# Patient Record
Sex: MALE
[De-identification: names, ages, dates, MRNs, and addresses within clinical notes are randomized per-mention and may not be internally consistent; named-entity substitution may affect disease eponyms.]

---

## 2017-07-12 ENCOUNTER — APPOINTMENT (OUTPATIENT)
Dept: UROLOGY | Facility: CLINIC | Age: 72
End: 2017-07-12

## 2017-07-12 VITALS
TEMPERATURE: 98 F | OXYGEN SATURATION: 96 % | HEIGHT: 71 IN | RESPIRATION RATE: 16 BRPM | HEART RATE: 82 BPM | DIASTOLIC BLOOD PRESSURE: 65 MMHG | BODY MASS INDEX: 29.12 KG/M2 | WEIGHT: 208 LBS | SYSTOLIC BLOOD PRESSURE: 116 MMHG

## 2017-07-22 LAB — PSA SERPL-MCNC: 2.67

## 2017-08-10 ENCOUNTER — NON-APPOINTMENT (OUTPATIENT)
Age: 72
End: 2017-08-10

## 2017-08-10 ENCOUNTER — APPOINTMENT (OUTPATIENT)
Dept: CARDIOLOGY | Facility: CLINIC | Age: 72
End: 2017-08-10
Payer: MEDICARE

## 2017-08-10 VITALS
SYSTOLIC BLOOD PRESSURE: 152 MMHG | HEIGHT: 71 IN | OXYGEN SATURATION: 98 % | WEIGHT: 202 LBS | HEART RATE: 64 BPM | DIASTOLIC BLOOD PRESSURE: 71 MMHG | BODY MASS INDEX: 28.28 KG/M2

## 2017-08-10 PROCEDURE — 99204 OFFICE O/P NEW MOD 45 MIN: CPT | Mod: 25

## 2017-08-10 PROCEDURE — 93000 ELECTROCARDIOGRAM COMPLETE: CPT

## 2017-09-27 ENCOUNTER — APPOINTMENT (OUTPATIENT)
Dept: CARDIOLOGY | Facility: CLINIC | Age: 72
End: 2017-09-27
Payer: MEDICARE

## 2017-09-27 PROCEDURE — 99214 OFFICE O/P EST MOD 30 MIN: CPT | Mod: 25

## 2017-09-27 PROCEDURE — 93325 DOPPLER ECHO COLOR FLOW MAPG: CPT

## 2017-09-27 PROCEDURE — 93351 STRESS TTE COMPLETE: CPT

## 2017-09-27 PROCEDURE — 93320 DOPPLER ECHO COMPLETE: CPT

## 2018-01-11 ENCOUNTER — APPOINTMENT (OUTPATIENT)
Dept: CARDIOLOGY | Facility: CLINIC | Age: 73
End: 2018-01-11
Payer: MEDICARE

## 2018-01-11 ENCOUNTER — NON-APPOINTMENT (OUTPATIENT)
Age: 73
End: 2018-01-11

## 2018-01-11 VITALS
BODY MASS INDEX: 28.7 KG/M2 | OXYGEN SATURATION: 99 % | HEART RATE: 53 BPM | DIASTOLIC BLOOD PRESSURE: 67 MMHG | WEIGHT: 205 LBS | SYSTOLIC BLOOD PRESSURE: 146 MMHG | HEIGHT: 71 IN

## 2018-01-11 PROCEDURE — 93000 ELECTROCARDIOGRAM COMPLETE: CPT

## 2018-01-11 PROCEDURE — 99214 OFFICE O/P EST MOD 30 MIN: CPT | Mod: 25

## 2018-03-28 ENCOUNTER — MEDICATION RENEWAL (OUTPATIENT)
Age: 73
End: 2018-03-28

## 2018-07-05 ENCOUNTER — MEDICATION RENEWAL (OUTPATIENT)
Age: 73
End: 2018-07-05

## 2018-08-14 ENCOUNTER — FORM ENCOUNTER (OUTPATIENT)
Age: 73
End: 2018-08-14

## 2018-08-15 ENCOUNTER — APPOINTMENT (OUTPATIENT)
Dept: CARDIOLOGY | Facility: CLINIC | Age: 73
End: 2018-08-15
Payer: MEDICARE

## 2018-08-15 ENCOUNTER — APPOINTMENT (OUTPATIENT)
Dept: CT IMAGING | Facility: CLINIC | Age: 73
End: 2018-08-15
Payer: MEDICARE

## 2018-08-15 ENCOUNTER — OUTPATIENT (OUTPATIENT)
Dept: OUTPATIENT SERVICES | Facility: HOSPITAL | Age: 73
LOS: 1 days | End: 2018-08-15
Payer: MEDICARE

## 2018-08-15 VITALS
DIASTOLIC BLOOD PRESSURE: 68 MMHG | HEIGHT: 71 IN | WEIGHT: 200 LBS | HEART RATE: 62 BPM | SYSTOLIC BLOOD PRESSURE: 132 MMHG | OXYGEN SATURATION: 98 % | BODY MASS INDEX: 28 KG/M2

## 2018-08-15 DIAGNOSIS — Z00.8 ENCOUNTER FOR OTHER GENERAL EXAMINATION: ICD-10-CM

## 2018-08-15 PROCEDURE — 74177 CT ABD & PELVIS W/CONTRAST: CPT | Mod: 26

## 2018-08-15 PROCEDURE — 82565 ASSAY OF CREATININE: CPT

## 2018-08-15 PROCEDURE — 99215 OFFICE O/P EST HI 40 MIN: CPT | Mod: 25

## 2018-08-15 PROCEDURE — 93000 ELECTROCARDIOGRAM COMPLETE: CPT

## 2018-08-15 PROCEDURE — 74177 CT ABD & PELVIS W/CONTRAST: CPT

## 2018-08-20 ENCOUNTER — NON-APPOINTMENT (OUTPATIENT)
Age: 73
End: 2018-08-20

## 2018-08-22 ENCOUNTER — APPOINTMENT (OUTPATIENT)
Dept: CARDIOLOGY | Facility: CLINIC | Age: 73
End: 2018-08-22
Payer: MEDICARE

## 2018-08-22 VITALS
HEART RATE: 58 BPM | DIASTOLIC BLOOD PRESSURE: 66 MMHG | WEIGHT: 200 LBS | HEIGHT: 71 IN | OXYGEN SATURATION: 96 % | SYSTOLIC BLOOD PRESSURE: 125 MMHG | BODY MASS INDEX: 28 KG/M2

## 2018-08-22 PROCEDURE — 99214 OFFICE O/P EST MOD 30 MIN: CPT

## 2018-08-26 ENCOUNTER — NON-APPOINTMENT (OUTPATIENT)
Age: 73
End: 2018-08-26

## 2018-09-14 ENCOUNTER — APPOINTMENT (OUTPATIENT)
Dept: UROLOGY | Facility: CLINIC | Age: 73
End: 2018-09-14
Payer: MEDICARE

## 2018-09-14 VITALS
OXYGEN SATURATION: 97 % | HEIGHT: 71 IN | SYSTOLIC BLOOD PRESSURE: 148 MMHG | HEART RATE: 60 BPM | WEIGHT: 201 LBS | TEMPERATURE: 97.7 F | DIASTOLIC BLOOD PRESSURE: 69 MMHG | BODY MASS INDEX: 28.14 KG/M2

## 2018-09-14 LAB — PSA SERPL-MCNC: 2.21

## 2018-09-14 PROCEDURE — 99213 OFFICE O/P EST LOW 20 MIN: CPT

## 2018-09-17 ENCOUNTER — OUTPATIENT (OUTPATIENT)
Dept: OUTPATIENT SERVICES | Facility: HOSPITAL | Age: 73
LOS: 1 days | Discharge: HOME | End: 2018-09-17
Payer: MEDICARE

## 2018-09-17 VITALS
HEIGHT: 70 IN | RESPIRATION RATE: 14 BRPM | SYSTOLIC BLOOD PRESSURE: 132 MMHG | WEIGHT: 201.06 LBS | DIASTOLIC BLOOD PRESSURE: 62 MMHG | OXYGEN SATURATION: 97 % | HEART RATE: 61 BPM | TEMPERATURE: 98 F

## 2018-09-17 DIAGNOSIS — Z98.890 OTHER SPECIFIED POSTPROCEDURAL STATES: Chronic | ICD-10-CM

## 2018-09-17 DIAGNOSIS — R19.5 OTHER FECAL ABNORMALITIES: ICD-10-CM

## 2018-09-17 DIAGNOSIS — Z90.89 ACQUIRED ABSENCE OF OTHER ORGANS: Chronic | ICD-10-CM

## 2018-09-17 LAB
ANION GAP SERPL CALC-SCNC: 7 MMOL/L — SIGNIFICANT CHANGE UP (ref 5–17)
BASOPHILS # BLD AUTO: 0.03 K/UL — SIGNIFICANT CHANGE UP (ref 0–0.2)
BASOPHILS NFR BLD AUTO: 0.4 % — SIGNIFICANT CHANGE UP (ref 0–2)
BUN SERPL-MCNC: 36 MG/DL — HIGH (ref 7–23)
CALCIUM SERPL-MCNC: 9.4 MG/DL — SIGNIFICANT CHANGE UP (ref 8.5–10.1)
CHLORIDE SERPL-SCNC: 106 MMOL/L — SIGNIFICANT CHANGE UP (ref 96–108)
CO2 SERPL-SCNC: 28 MMOL/L — SIGNIFICANT CHANGE UP (ref 22–31)
CREAT SERPL-MCNC: 1.61 MG/DL — HIGH (ref 0.5–1.3)
EOSINOPHIL # BLD AUTO: 0.45 K/UL — SIGNIFICANT CHANGE UP (ref 0–0.5)
EOSINOPHIL NFR BLD AUTO: 5.4 % — SIGNIFICANT CHANGE UP (ref 0–6)
GLUCOSE SERPL-MCNC: 220 MG/DL — HIGH (ref 70–99)
HCT VFR BLD CALC: 42.7 % — SIGNIFICANT CHANGE UP (ref 39–50)
HGB BLD-MCNC: 13.6 G/DL — SIGNIFICANT CHANGE UP (ref 13–17)
IMM GRANULOCYTES NFR BLD AUTO: 0.6 % — SIGNIFICANT CHANGE UP (ref 0–1.5)
LYMPHOCYTES # BLD AUTO: 1.72 K/UL — SIGNIFICANT CHANGE UP (ref 1–3.3)
LYMPHOCYTES # BLD AUTO: 20.7 % — SIGNIFICANT CHANGE UP (ref 13–44)
MCHC RBC-ENTMCNC: 29.8 PG — SIGNIFICANT CHANGE UP (ref 27–34)
MCHC RBC-ENTMCNC: 31.9 GM/DL — LOW (ref 32–36)
MCV RBC AUTO: 93.4 FL — SIGNIFICANT CHANGE UP (ref 80–100)
MONOCYTES # BLD AUTO: 0.54 K/UL — SIGNIFICANT CHANGE UP (ref 0–0.9)
MONOCYTES NFR BLD AUTO: 6.5 % — SIGNIFICANT CHANGE UP (ref 2–14)
NEUTROPHILS # BLD AUTO: 5.51 K/UL — SIGNIFICANT CHANGE UP (ref 1.8–7.4)
NEUTROPHILS NFR BLD AUTO: 66.4 % — SIGNIFICANT CHANGE UP (ref 43–77)
PLATELET # BLD AUTO: 252 K/UL — SIGNIFICANT CHANGE UP (ref 150–400)
POTASSIUM SERPL-MCNC: 4.3 MMOL/L — SIGNIFICANT CHANGE UP (ref 3.5–5.3)
POTASSIUM SERPL-SCNC: 4.3 MMOL/L — SIGNIFICANT CHANGE UP (ref 3.5–5.3)
RBC # BLD: 4.57 M/UL — SIGNIFICANT CHANGE UP (ref 4.2–5.8)
RBC # FLD: 13.7 % — SIGNIFICANT CHANGE UP (ref 10.3–14.5)
SODIUM SERPL-SCNC: 141 MMOL/L — SIGNIFICANT CHANGE UP (ref 135–145)
WBC # BLD: 8.3 K/UL — SIGNIFICANT CHANGE UP (ref 3.8–10.5)
WBC # FLD AUTO: 8.3 K/UL — SIGNIFICANT CHANGE UP (ref 3.8–10.5)

## 2018-09-17 PROCEDURE — 93010 ELECTROCARDIOGRAM REPORT: CPT

## 2018-09-17 NOTE — H&P PST ADULT - PMH
Blood in stool    BPH (benign prostatic hyperplasia)    Eczema    Hypertriglyceridemia    Non-Hodgkin lymphoma  in neck s/p radiation  Pure hypercholesterolemia    Screening for colon cancer    Type 2 diabetes mellitus Blood in stool    BPH (benign prostatic hyperplasia)    Eczema    Hypertriglyceridemia    Non-Hodgkin lymphoma  in neck s/p radiation  Pure hypercholesterolemia    Screening for colon cancer    Type 2 diabetes mellitus    Upper respiratory infection  currently on levofloxacin

## 2018-09-17 NOTE — H&P PST ADULT - NSANTHOSAYNRD_GEN_A_CORE
No. ALMAZ screening performed.  STOP BANG Legend: 0-2 = LOW Risk; 3-4 = INTERMEDIATE Risk; 5-8 = HIGH Risk

## 2018-09-17 NOTE — H&P PST ADULT - ASSESSMENT
yo  scheduled for EGD/colonoscopy with Dr. urias    1. Labs as per surgeon  2. EKG  3. Pre-procedure instructions as per surgeon 72 yo male scheduled for baseline colonoscopy on 9/25/18 with Dr. Simmons    1. Labs as per surgeon  2. EKG  3. Medication & Pre-procedure instructions as per surgeon

## 2018-09-17 NOTE — H&P PST ADULT - HISTORY OF PRESENT ILLNESS
72 yo male presents to PST. Reports seeing blood in stool a few months back & having episodes of diarrhea at that time. Reports diarrhea & blood in stool have resolved since then. Consulted with Dr Simmons who recommended baseline colonoscopy screening. Denies n/v or abdominal pain

## 2018-09-17 NOTE — H&P PST ADULT - HEMATOLOGY/LYMPHATICS COMMENTS
Reports being treated for non-Hodgkin's lymphoma in neck s/p radiation 2003. Denies recent f/u or treatment.

## 2018-09-17 NOTE — H&P PST ADULT - FAMILY HISTORY
Mother  Still living? No  Family history of type 2 diabetes mellitus, Age at diagnosis: Age Unknown  Family history of leukemia, Age at diagnosis: Age Unknown

## 2018-09-24 PROBLEM — N40.0 BENIGN PROSTATIC HYPERPLASIA WITHOUT LOWER URINARY TRACT SYMPTOMS: Chronic | Status: ACTIVE | Noted: 2018-09-17

## 2018-09-24 PROBLEM — E78.00 PURE HYPERCHOLESTEROLEMIA, UNSPECIFIED: Chronic | Status: ACTIVE | Noted: 2018-09-17

## 2018-09-24 PROBLEM — Z12.11 ENCOUNTER FOR SCREENING FOR MALIGNANT NEOPLASM OF COLON: Chronic | Status: ACTIVE | Noted: 2018-09-17

## 2018-09-24 PROBLEM — K92.1 MELENA: Chronic | Status: ACTIVE | Noted: 2018-09-17

## 2018-09-24 PROBLEM — C85.90 NON-HODGKIN LYMPHOMA, UNSPECIFIED, UNSPECIFIED SITE: Chronic | Status: ACTIVE | Noted: 2018-09-17

## 2018-09-24 PROBLEM — L30.9 DERMATITIS, UNSPECIFIED: Chronic | Status: ACTIVE | Noted: 2018-09-17

## 2018-09-24 PROBLEM — E78.1 PURE HYPERGLYCERIDEMIA: Chronic | Status: ACTIVE | Noted: 2018-09-17

## 2018-09-24 PROBLEM — E11.9 TYPE 2 DIABETES MELLITUS WITHOUT COMPLICATIONS: Chronic | Status: ACTIVE | Noted: 2018-09-17

## 2018-09-24 PROBLEM — J06.9 ACUTE UPPER RESPIRATORY INFECTION, UNSPECIFIED: Chronic | Status: ACTIVE | Noted: 2018-09-17

## 2018-09-25 ENCOUNTER — RESULT REVIEW (OUTPATIENT)
Age: 73
End: 2018-09-25

## 2018-09-25 ENCOUNTER — OUTPATIENT (OUTPATIENT)
Dept: OUTPATIENT SERVICES | Facility: HOSPITAL | Age: 73
LOS: 1 days | Discharge: HOME | End: 2018-09-25
Payer: MEDICARE

## 2018-09-25 VITALS
HEART RATE: 58 BPM | RESPIRATION RATE: 17 BRPM | HEIGHT: 71 IN | SYSTOLIC BLOOD PRESSURE: 135 MMHG | TEMPERATURE: 98 F | OXYGEN SATURATION: 97 % | WEIGHT: 201.06 LBS | DIASTOLIC BLOOD PRESSURE: 74 MMHG

## 2018-09-25 DIAGNOSIS — Z90.89 ACQUIRED ABSENCE OF OTHER ORGANS: Chronic | ICD-10-CM

## 2018-09-25 DIAGNOSIS — Z98.890 OTHER SPECIFIED POSTPROCEDURAL STATES: Chronic | ICD-10-CM

## 2018-09-25 LAB — GLUCOSE BLDC GLUCOMTR-MCNC: 159 MG/DL — HIGH (ref 70–99)

## 2018-09-25 PROCEDURE — 88305 TISSUE EXAM BY PATHOLOGIST: CPT | Mod: 26

## 2018-09-25 NOTE — ASU PATIENT PROFILE, ADULT - PMH
Blood in stool    BPH (benign prostatic hyperplasia)    Eczema    Hypertriglyceridemia    Non-Hodgkin lymphoma  in neck s/p radiation  Pure hypercholesterolemia    Screening for colon cancer    Type 2 diabetes mellitus    Upper respiratory infection  currently on levofloxacin

## 2018-09-26 LAB — SURGICAL PATHOLOGY FINAL REPORT - CH: SIGNIFICANT CHANGE UP

## 2018-10-01 DIAGNOSIS — K62.5 HEMORRHAGE OF ANUS AND RECTUM: ICD-10-CM

## 2018-10-01 DIAGNOSIS — Z83.3 FAMILY HISTORY OF DIABETES MELLITUS: ICD-10-CM

## 2018-10-01 DIAGNOSIS — K57.30 DIVERTICULOSIS OF LARGE INTESTINE WITHOUT PERFORATION OR ABSCESS WITHOUT BLEEDING: ICD-10-CM

## 2018-10-01 DIAGNOSIS — Z79.82 LONG TERM (CURRENT) USE OF ASPIRIN: ICD-10-CM

## 2018-10-01 DIAGNOSIS — Z85.72 PERSONAL HISTORY OF NON-HODGKIN LYMPHOMAS: ICD-10-CM

## 2018-10-01 DIAGNOSIS — Z79.4 LONG TERM (CURRENT) USE OF INSULIN: ICD-10-CM

## 2018-10-01 DIAGNOSIS — E11.9 TYPE 2 DIABETES MELLITUS WITHOUT COMPLICATIONS: ICD-10-CM

## 2018-10-01 DIAGNOSIS — Z88.8 ALLERGY STATUS TO OTHER DRUGS, MEDICAMENTS AND BIOLOGICAL SUBSTANCES STATUS: ICD-10-CM

## 2018-10-01 DIAGNOSIS — D12.3 BENIGN NEOPLASM OF TRANSVERSE COLON: ICD-10-CM

## 2018-10-01 DIAGNOSIS — D12.4 BENIGN NEOPLASM OF DESCENDING COLON: ICD-10-CM

## 2018-10-01 DIAGNOSIS — N40.0 BENIGN PROSTATIC HYPERPLASIA WITHOUT LOWER URINARY TRACT SYMPTOMS: ICD-10-CM

## 2018-10-01 DIAGNOSIS — E78.1 PURE HYPERGLYCERIDEMIA: ICD-10-CM

## 2018-10-01 DIAGNOSIS — Z80.7 FAMILY HISTORY OF OTHER MALIGNANT NEOPLASMS OF LYMPHOID, HEMATOPOIETIC AND RELATED TISSUES: ICD-10-CM

## 2018-10-01 DIAGNOSIS — Z92.3 PERSONAL HISTORY OF IRRADIATION: ICD-10-CM

## 2018-10-05 ENCOUNTER — APPOINTMENT (OUTPATIENT)
Dept: UROLOGY | Facility: CLINIC | Age: 73
End: 2018-10-05

## 2018-12-05 ENCOUNTER — APPOINTMENT (OUTPATIENT)
Dept: CARDIOLOGY | Facility: CLINIC | Age: 73
End: 2018-12-05

## 2018-12-07 ENCOUNTER — APPOINTMENT (OUTPATIENT)
Dept: UROLOGY | Facility: CLINIC | Age: 73
End: 2018-12-07
Payer: MEDICARE

## 2018-12-07 VITALS
BODY MASS INDEX: 28.7 KG/M2 | SYSTOLIC BLOOD PRESSURE: 150 MMHG | OXYGEN SATURATION: 98 % | HEIGHT: 71 IN | TEMPERATURE: 97.3 F | HEART RATE: 55 BPM | DIASTOLIC BLOOD PRESSURE: 77 MMHG | WEIGHT: 205 LBS

## 2018-12-07 PROCEDURE — 52000 CYSTOURETHROSCOPY: CPT

## 2018-12-07 PROCEDURE — 99213 OFFICE O/P EST LOW 20 MIN: CPT | Mod: 25

## 2018-12-20 ENCOUNTER — MEDICATION RENEWAL (OUTPATIENT)
Age: 73
End: 2018-12-20

## 2019-03-19 ENCOUNTER — APPOINTMENT (OUTPATIENT)
Dept: ENDOCRINOLOGY | Facility: CLINIC | Age: 74
End: 2019-03-19
Payer: MEDICARE

## 2019-03-19 ENCOUNTER — RX RENEWAL (OUTPATIENT)
Age: 74
End: 2019-03-19

## 2019-03-19 VITALS
OXYGEN SATURATION: 98 % | TEMPERATURE: 97.3 F | HEART RATE: 60 BPM | BODY MASS INDEX: 28.84 KG/M2 | SYSTOLIC BLOOD PRESSURE: 150 MMHG | DIASTOLIC BLOOD PRESSURE: 72 MMHG | WEIGHT: 206 LBS | HEIGHT: 71 IN

## 2019-03-19 DIAGNOSIS — R31.29 OTHER MICROSCOPIC HEMATURIA: ICD-10-CM

## 2019-03-19 DIAGNOSIS — Z80.6 FAMILY HISTORY OF LEUKEMIA: ICD-10-CM

## 2019-03-19 LAB
GLUCOSE BLDC GLUCOMTR-MCNC: 106
HBA1C MFR BLD HPLC: 7.1

## 2019-03-19 PROCEDURE — 99214 OFFICE O/P EST MOD 30 MIN: CPT

## 2019-03-19 PROCEDURE — 82962 GLUCOSE BLOOD TEST: CPT

## 2019-03-19 PROCEDURE — 99204 OFFICE O/P NEW MOD 45 MIN: CPT

## 2019-03-19 PROCEDURE — 83036 HEMOGLOBIN GLYCOSYLATED A1C: CPT | Mod: QW

## 2019-03-19 RX ORDER — FENOFIBRATE 134 MG/1
134 CAPSULE ORAL DAILY
Refills: 0 | Status: DISCONTINUED | COMMUNITY
End: 2019-03-19

## 2019-03-19 RX ORDER — ADHESIVE TAPE 3"X 2.3 YD
50 MCG TAPE, NON-MEDICATED TOPICAL
Qty: 90 | Refills: 1 | Status: DISCONTINUED | COMMUNITY
End: 2019-03-19

## 2019-03-19 RX ORDER — CIPROFLOXACIN HYDROCHLORIDE 500 MG/1
500 TABLET, FILM COATED ORAL
Qty: 14 | Refills: 0 | Status: DISCONTINUED | COMMUNITY
Start: 2018-12-20 | End: 2019-03-19

## 2019-03-19 RX ORDER — CIPROFLOXACIN HYDROCHLORIDE 500 MG/1
500 TABLET, FILM COATED ORAL DAILY
Qty: 14 | Refills: 0 | Status: DISCONTINUED | COMMUNITY
Start: 2018-08-15 | End: 2019-03-19

## 2019-03-19 RX ORDER — CANAGLIFLOZIN 100 MG/1
100 TABLET, FILM COATED ORAL DAILY
Refills: 0 | Status: DISCONTINUED | COMMUNITY
End: 2019-03-19

## 2019-03-19 RX ORDER — FINASTERIDE 5 MG/1
5 TABLET, FILM COATED ORAL
Refills: 0 | Status: DISCONTINUED | COMMUNITY
End: 2019-03-19

## 2019-03-19 RX ORDER — CEFUROXIME AXETIL 500 MG/1
500 TABLET ORAL
Qty: 20 | Refills: 0 | Status: DISCONTINUED | COMMUNITY
Start: 2018-12-14 | End: 2019-03-19

## 2019-03-19 RX ORDER — GLIPIZIDE 5 MG/1
5 TABLET ORAL TWICE DAILY
Qty: 180 | Refills: 3 | Status: DISCONTINUED | COMMUNITY
End: 2019-03-19

## 2019-03-19 RX ORDER — PAPAVERINE HYDROCHLORIDE 30 MG/ML
30 INJECTION, SOLUTION INTRAVENOUS
Qty: 5 | Refills: 6 | Status: DISCONTINUED | COMMUNITY
Start: 2017-09-01 | End: 2019-03-19

## 2019-03-19 RX ORDER — INSULIN DETEMIR 100 [IU]/ML
100 INJECTION, SOLUTION SUBCUTANEOUS
Refills: 0 | Status: DISCONTINUED | COMMUNITY
End: 2019-03-19

## 2019-03-21 ENCOUNTER — CHART COPY (OUTPATIENT)
Age: 74
End: 2019-03-21

## 2019-03-21 ENCOUNTER — MEDICATION RENEWAL (OUTPATIENT)
Age: 74
End: 2019-03-21

## 2019-03-21 ENCOUNTER — RX RENEWAL (OUTPATIENT)
Age: 74
End: 2019-03-21

## 2019-03-29 LAB
25(OH)D3 SERPL-MCNC: 35.4 NG/ML
ALBUMIN SERPL ELPH-MCNC: 4.6 G/DL
ALP BLD-CCNC: 60 U/L
ALT SERPL-CCNC: 22 U/L
ANION GAP SERPL CALC-SCNC: 15 MMOL/L
AST SERPL-CCNC: 25 U/L
BILIRUB SERPL-MCNC: 0.4 MG/DL
BUN SERPL-MCNC: 29 MG/DL
CALCIUM SERPL-MCNC: 11.2 MG/DL
CHLORIDE SERPL-SCNC: 104 MMOL/L
CO2 SERPL-SCNC: 22 MMOL/L
CREAT SERPL-MCNC: 1.49 MG/DL
CREAT SPEC-SCNC: 116 MG/DL
FRUCTOSAMINE SERPL-MCNC: 266 UMOL/L
GLUCOSE SERPL-MCNC: 94 MG/DL
GLYCOMARK.: 1.2 UG/ML
HBA1C MFR BLD HPLC: 7.2 %
HDLC SERPL-MCNC: 36 MG/DL
LDLC SERPL DIRECT ASSAY-MCNC: 56 MG/DL
MICROALBUMIN 24H UR DL<=1MG/L-MCNC: <1.2 MG/DL
MICROALBUMIN/CREAT 24H UR-RTO: NORMAL MG/G
POTASSIUM SERPL-SCNC: 5 MMOL/L
PROT SERPL-MCNC: 7.3 G/DL
SODIUM SERPL-SCNC: 141 MMOL/L
T4 FREE SERPL-MCNC: 1.5 NG/DL
TRIGL SERPL-MCNC: 179 MG/DL
TSH SERPL-ACNC: 1.79 UIU/ML

## 2019-04-12 NOTE — HISTORY OF PRESENT ILLNESS
[FreeTextEntry1] : Mr. Dey  returns today in follow up with regard to a history of type 2 diabetes mellitus.  There is no known history of retinopathy, nephropathy. He too denies any history of neuropathy.  Today 's  Hba1c returned at 7.1 % and POCt bg 106 mg/dl.  .  Current dm medication include jardiance, janumet, novolog, levmir   .  HGM of late has shown values to be running  am 125-184 mg/dl, lunch 136-150mg/dl , dinner 112-177 mg/dl  and at bedtime 117-221 mg/dl. . There has been no significant hypoglycemia. He denies any chest pain, sob, neurologic or ophthalmologic complaints. He too denies any new podiatric concerns. He is to schedule with his ophthalmologist.Additional history includes that of  hyperlipidemia, vitamin D deficiency, and bph. Patient denies any chest pain shortness of breath, neurologic, or ophthalmologic complaints.\par \par \par \par \par

## 2019-04-12 NOTE — PHYSICAL EXAM
[Alert] : alert [No Acute Distress] : no acute distress [Well Nourished] : well nourished [Well Developed] : well developed [Normal Sclera/Conjunctiva] : normal sclera/conjunctiva [EOMI] : extra ocular movement intact [No Proptosis] : no proptosis [Normal Oropharynx] : the oropharynx was normal [Thyroid Not Enlarged] : the thyroid was not enlarged [No Thyroid Nodules] : there were no palpable thyroid nodules [No Respiratory Distress] : no respiratory distress [No Accessory Muscle Use] : no accessory muscle use [Clear to Auscultation] : lungs were clear to auscultation bilaterally [Normal Rate] : heart rate was normal  [Regular Rhythm] : with a regular rhythm [Normal S1, S2] : normal S1 and S2 [Pedal Pulses Normal] : the pedal pulses are present [No Edema] : there was no peripheral edema [Normal Bowel Sounds] : normal bowel sounds [Not Tender] : non-tender [Soft] : abdomen soft [Not Distended] : not distended [Post Cervical Nodes] : posterior cervical nodes [Anterior Cervical Nodes] : anterior cervical nodes [Axillary Nodes] : axillary nodes [Normal] : normal and non tender [Spine Straight] : spine straight [No Spinal Tenderness] : no spinal tenderness [No Stigmata of Cushings Syndrome] : no stigmata of cushings syndrome [Normal Gait] : normal gait [Normal Strength/Tone] : muscle strength and tone were normal [No Rash] : no rash [Normal Reflexes] : deep tendon reflexes were 2+ and symmetric [No Tremors] : no tremors [Oriented x3] : oriented to person, place, and time [Acanthosis Nigricans] : no acanthosis nigricans

## 2019-05-17 ENCOUNTER — RX RENEWAL (OUTPATIENT)
Age: 74
End: 2019-05-17

## 2019-05-28 ENCOUNTER — RX RENEWAL (OUTPATIENT)
Age: 74
End: 2019-05-28

## 2019-05-29 ENCOUNTER — RX RENEWAL (OUTPATIENT)
Age: 74
End: 2019-05-29

## 2019-05-31 ENCOUNTER — RX RENEWAL (OUTPATIENT)
Age: 74
End: 2019-05-31

## 2019-06-13 ENCOUNTER — RX RENEWAL (OUTPATIENT)
Age: 74
End: 2019-06-13

## 2019-06-18 ENCOUNTER — APPOINTMENT (OUTPATIENT)
Dept: ENDOCRINOLOGY | Facility: CLINIC | Age: 74
End: 2019-06-18
Payer: MEDICARE

## 2019-06-18 ENCOUNTER — TRANSCRIPTION ENCOUNTER (OUTPATIENT)
Age: 74
End: 2019-06-18

## 2019-06-18 ENCOUNTER — RX RENEWAL (OUTPATIENT)
Age: 74
End: 2019-06-18

## 2019-06-18 ENCOUNTER — LABORATORY RESULT (OUTPATIENT)
Age: 74
End: 2019-06-18

## 2019-06-18 VITALS
BODY MASS INDEX: 29.26 KG/M2 | HEART RATE: 57 BPM | OXYGEN SATURATION: 96 % | DIASTOLIC BLOOD PRESSURE: 80 MMHG | WEIGHT: 209 LBS | TEMPERATURE: 98.7 F | HEIGHT: 71 IN | SYSTOLIC BLOOD PRESSURE: 124 MMHG

## 2019-06-18 PROCEDURE — 99214 OFFICE O/P EST MOD 30 MIN: CPT | Mod: 25

## 2019-06-18 PROCEDURE — 83036 HEMOGLOBIN GLYCOSYLATED A1C: CPT | Mod: QW

## 2019-06-18 PROCEDURE — 82962 GLUCOSE BLOOD TEST: CPT

## 2019-06-18 PROCEDURE — 36415 COLL VENOUS BLD VENIPUNCTURE: CPT

## 2019-06-18 RX ORDER — INSULIN ASPART 100 [IU]/ML
100 INJECTION, SOLUTION INTRAVENOUS; SUBCUTANEOUS
Refills: 0 | Status: DISCONTINUED | COMMUNITY
End: 2019-06-18

## 2019-07-08 ENCOUNTER — MEDICATION RENEWAL (OUTPATIENT)
Age: 74
End: 2019-07-08

## 2019-07-20 LAB
24R-OH-CALCIDIOL SERPL-MCNC: 36.7 PG/ML
25(OH)D3 SERPL-MCNC: 38.5 NG/ML
ALBUMIN SERPL ELPH-MCNC: 4.8 G/DL
ALP BLD-CCNC: 67 U/L
ALT SERPL-CCNC: 23 U/L
ANION GAP SERPL CALC-SCNC: 13 MMOL/L
AST SERPL-CCNC: 22 U/L
BILIRUB DIRECT SERPL-MCNC: 0.2 MG/DL
BILIRUB INDIRECT SERPL-MCNC: 0.3 MG/DL
BILIRUB SERPL-MCNC: 0.5 MG/DL
BUN SERPL-MCNC: 25 MG/DL
CALCIUM SERPL-MCNC: 10.6 MG/DL
CALCIUM SERPL-MCNC: 10.6 MG/DL
CHLORIDE SERPL-SCNC: 105 MMOL/L
CO2 SERPL-SCNC: 24 MMOL/L
CREAT SERPL-MCNC: 1.49 MG/DL
CREAT SPEC-SCNC: 88 MG/DL
GLUCOSE BLDC GLUCOMTR-MCNC: 104
GLUCOSE SERPL-MCNC: 111 MG/DL
HBA1C MFR BLD HPLC: 7.2
IGA 24H UR QL IFE: NORMAL
M PROTEIN SPEC IFE-MCNC: NORMAL
MICROALBUMIN 24H UR DL<=1MG/L-MCNC: <1.2 MG/DL
MICROALBUMIN/CREAT 24H UR-RTO: NORMAL MG/G
PARATHYROID HORMONE INTACT: 10 PG/ML
PHOSPHATE SERPL-MCNC: 3.8 MG/DL
POTASSIUM SERPL-SCNC: 4.7 MMOL/L
PROT SERPL-MCNC: 7.5 G/DL
SODIUM SERPL-SCNC: 142 MMOL/L

## 2019-07-25 ENCOUNTER — RX RENEWAL (OUTPATIENT)
Age: 74
End: 2019-07-25

## 2019-07-28 NOTE — PHYSICAL EXAM
[Alert] : alert [No Acute Distress] : no acute distress [Well Nourished] : well nourished [Normal Sclera/Conjunctiva] : normal sclera/conjunctiva [EOMI] : extra ocular movement intact [Well Developed] : well developed [No Proptosis] : no proptosis [Normal Oropharynx] : the oropharynx was normal [No Thyroid Nodules] : there were no palpable thyroid nodules [No Respiratory Distress] : no respiratory distress [Thyroid Not Enlarged] : the thyroid was not enlarged [No Accessory Muscle Use] : no accessory muscle use [Clear to Auscultation] : lungs were clear to auscultation bilaterally [Regular Rhythm] : with a regular rhythm [Normal Rate] : heart rate was normal  [Normal S1, S2] : normal S1 and S2 [Pedal Pulses Normal] : the pedal pulses are present [No Edema] : there was no peripheral edema [Not Tender] : non-tender [Normal Bowel Sounds] : normal bowel sounds [Soft] : abdomen soft [Post Cervical Nodes] : posterior cervical nodes [Not Distended] : not distended [Anterior Cervical Nodes] : anterior cervical nodes [Normal] : normal and non tender [Axillary Nodes] : axillary nodes [Spine Straight] : spine straight [No Stigmata of Cushings Syndrome] : no stigmata of cushings syndrome [No Spinal Tenderness] : no spinal tenderness [No Rash] : no rash [Normal Gait] : normal gait [Normal Strength/Tone] : muscle strength and tone were normal [Normal Reflexes] : deep tendon reflexes were 2+ and symmetric [No Tremors] : no tremors [Oriented x3] : oriented to person, place, and time [Acanthosis Nigricans] : no acanthosis nigricans

## 2019-07-28 NOTE — HISTORY OF PRESENT ILLNESS
[FreeTextEntry1] : Mr. Dey  returns today in follow up with regard to a history of type 2 diabetes mellitus.  There is no known history of retinopathy, nephropathy. He too denies any history of neuropathy.  Today's  Hba1c returned at 7.2 % and  POCT 104 mg/dl  .  Current dm medication include Janumet xr    one tablet daily, Jardiance 10 mg po daily,  levernmir 60 units in am and 60 units at night , novolog inject up to 30-60 units per  day based on sliding scale. .  HGM of late has shown values to be running in am 140's-180's, lunch , dinner 's , night 100-200's . There has been no significant hypoglycemia. He denies any chest pain, sob, neurologic or ophthalmologic complaints. He too denies any new podiatric concerns. He is up to date with his ophthalmologic visit. Additional diagnose include: hyperlipidemia\par \par last visit serum calcium was 11.2 and creatinine was 1.49-\par

## 2019-07-31 ENCOUNTER — RX RENEWAL (OUTPATIENT)
Age: 74
End: 2019-07-31

## 2019-07-31 ENCOUNTER — MEDICATION RENEWAL (OUTPATIENT)
Age: 74
End: 2019-07-31

## 2019-08-02 ENCOUNTER — RECORD ABSTRACTING (OUTPATIENT)
Age: 74
End: 2019-08-02

## 2019-08-02 DIAGNOSIS — Z80.43 FAMILY HISTORY OF MALIGNANT NEOPLASM OF TESTIS: ICD-10-CM

## 2019-08-02 DIAGNOSIS — R09.89 OTHER SPECIFIED SYMPTOMS AND SIGNS INVOLVING THE CIRCULATORY AND RESPIRATORY SYSTEMS: ICD-10-CM

## 2019-08-02 DIAGNOSIS — Z92.3 PERSONAL HISTORY OF IRRADIATION: ICD-10-CM

## 2019-08-05 ENCOUNTER — RX RENEWAL (OUTPATIENT)
Age: 74
End: 2019-08-05

## 2019-08-16 ENCOUNTER — APPOINTMENT (OUTPATIENT)
Dept: FAMILY MEDICINE | Facility: CLINIC | Age: 74
End: 2019-08-16
Payer: MEDICARE

## 2019-08-16 VITALS
WEIGHT: 209 LBS | DIASTOLIC BLOOD PRESSURE: 62 MMHG | SYSTOLIC BLOOD PRESSURE: 128 MMHG | HEIGHT: 71 IN | BODY MASS INDEX: 29.26 KG/M2

## 2019-08-16 DIAGNOSIS — R68.82 DECREASED LIBIDO: ICD-10-CM

## 2019-08-16 DIAGNOSIS — Z87.19 PERSONAL HISTORY OF OTHER DISEASES OF THE DIGESTIVE SYSTEM: ICD-10-CM

## 2019-08-16 DIAGNOSIS — Z79.4 LONG TERM (CURRENT) USE OF INSULIN: ICD-10-CM

## 2019-08-16 DIAGNOSIS — Z86.79 PERSONAL HISTORY OF OTHER DISEASES OF THE CIRCULATORY SYSTEM: ICD-10-CM

## 2019-08-16 PROCEDURE — 99204 OFFICE O/P NEW MOD 45 MIN: CPT

## 2019-08-16 RX ORDER — FLASH GLUCOSE SENSOR
KIT MISCELLANEOUS
Qty: 2 | Refills: 0 | Status: DISCONTINUED | COMMUNITY
Start: 2019-03-19 | End: 2019-08-16

## 2019-08-16 NOTE — HEALTH RISK ASSESSMENT
[Patient reported colonoscopy was normal] : Patient reported colonoscopy was normal [ColonoscopyDate] : 09/18

## 2019-09-30 ENCOUNTER — INPATIENT (INPATIENT)
Facility: HOSPITAL | Age: 74
LOS: 0 days | Discharge: HOME | DRG: 313 | End: 2019-10-01
Attending: FAMILY MEDICINE | Admitting: FAMILY MEDICINE
Payer: MEDICARE

## 2019-09-30 VITALS — HEIGHT: 71 IN | WEIGHT: 205.03 LBS

## 2019-09-30 DIAGNOSIS — E11.638 TYPE 2 DIABETES MELLITUS WITH OTHER ORAL COMPLICATIONS: ICD-10-CM

## 2019-09-30 DIAGNOSIS — Z98.890 OTHER SPECIFIED POSTPROCEDURAL STATES: Chronic | ICD-10-CM

## 2019-09-30 DIAGNOSIS — R07.9 CHEST PAIN, UNSPECIFIED: ICD-10-CM

## 2019-09-30 DIAGNOSIS — Z90.89 ACQUIRED ABSENCE OF OTHER ORGANS: Chronic | ICD-10-CM

## 2019-09-30 DIAGNOSIS — E78.2 MIXED HYPERLIPIDEMIA: ICD-10-CM

## 2019-09-30 DIAGNOSIS — R01.1 CARDIAC MURMUR, UNSPECIFIED: ICD-10-CM

## 2019-09-30 LAB
ALBUMIN SERPL ELPH-MCNC: 3.7 G/DL — SIGNIFICANT CHANGE UP (ref 3.3–5)
ALP SERPL-CCNC: 60 U/L — SIGNIFICANT CHANGE UP (ref 40–120)
ALT FLD-CCNC: 34 U/L — SIGNIFICANT CHANGE UP (ref 12–78)
ANION GAP SERPL CALC-SCNC: 7 MMOL/L — SIGNIFICANT CHANGE UP (ref 5–17)
APTT BLD: 31.7 SEC — SIGNIFICANT CHANGE UP (ref 27.5–36.3)
AST SERPL-CCNC: 24 U/L — SIGNIFICANT CHANGE UP (ref 15–37)
BILIRUB SERPL-MCNC: 0.5 MG/DL — SIGNIFICANT CHANGE UP (ref 0.2–1.2)
BUN SERPL-MCNC: 26 MG/DL — HIGH (ref 7–23)
CALCIUM SERPL-MCNC: 9.8 MG/DL — SIGNIFICANT CHANGE UP (ref 8.5–10.1)
CHLORIDE SERPL-SCNC: 110 MMOL/L — HIGH (ref 96–108)
CO2 SERPL-SCNC: 25 MMOL/L — SIGNIFICANT CHANGE UP (ref 22–31)
CREAT SERPL-MCNC: 1.52 MG/DL — HIGH (ref 0.5–1.3)
D DIMER BLD IA.RAPID-MCNC: <150 NG/ML DDU — SIGNIFICANT CHANGE UP
GLUCOSE SERPL-MCNC: 190 MG/DL — HIGH (ref 70–99)
HCT VFR BLD CALC: 42.6 % — SIGNIFICANT CHANGE UP (ref 39–50)
HGB BLD-MCNC: 13.8 G/DL — SIGNIFICANT CHANGE UP (ref 13–17)
INR BLD: 1.1 RATIO — SIGNIFICANT CHANGE UP (ref 0.88–1.16)
MAGNESIUM SERPL-MCNC: 2.2 MG/DL — SIGNIFICANT CHANGE UP (ref 1.6–2.6)
MCHC RBC-ENTMCNC: 30 PG — SIGNIFICANT CHANGE UP (ref 27–34)
MCHC RBC-ENTMCNC: 32.4 GM/DL — SIGNIFICANT CHANGE UP (ref 32–36)
MCV RBC AUTO: 92.6 FL — SIGNIFICANT CHANGE UP (ref 80–100)
NT-PROBNP SERPL-SCNC: 221 PG/ML — HIGH (ref 0–125)
PLATELET # BLD AUTO: 298 K/UL — SIGNIFICANT CHANGE UP (ref 150–400)
POTASSIUM SERPL-MCNC: 4.2 MMOL/L — SIGNIFICANT CHANGE UP (ref 3.5–5.3)
POTASSIUM SERPL-SCNC: 4.2 MMOL/L — SIGNIFICANT CHANGE UP (ref 3.5–5.3)
PROT SERPL-MCNC: 7.3 GM/DL — SIGNIFICANT CHANGE UP (ref 6–8.3)
PROTHROM AB SERPL-ACNC: 12.3 SEC — SIGNIFICANT CHANGE UP (ref 10–12.9)
RBC # BLD: 4.6 M/UL — SIGNIFICANT CHANGE UP (ref 4.2–5.8)
RBC # FLD: 13.2 % — SIGNIFICANT CHANGE UP (ref 10.3–14.5)
SODIUM SERPL-SCNC: 142 MMOL/L — SIGNIFICANT CHANGE UP (ref 135–145)
TROPONIN I SERPL-MCNC: 0.01 NG/ML — SIGNIFICANT CHANGE UP (ref 0.01–0.04)
TROPONIN I SERPL-MCNC: <0.015 NG/ML — SIGNIFICANT CHANGE UP (ref 0.01–0.04)
WBC # BLD: 9.36 K/UL — SIGNIFICANT CHANGE UP (ref 3.8–10.5)
WBC # FLD AUTO: 9.36 K/UL — SIGNIFICANT CHANGE UP (ref 3.8–10.5)

## 2019-09-30 PROCEDURE — 85730 THROMBOPLASTIN TIME PARTIAL: CPT

## 2019-09-30 PROCEDURE — 93306 TTE W/DOPPLER COMPLETE: CPT | Mod: 26

## 2019-09-30 PROCEDURE — 80048 BASIC METABOLIC PNL TOTAL CA: CPT

## 2019-09-30 PROCEDURE — 93005 ELECTROCARDIOGRAM TRACING: CPT | Mod: XU

## 2019-09-30 PROCEDURE — 78452 HT MUSCLE IMAGE SPECT MULT: CPT

## 2019-09-30 PROCEDURE — 85027 COMPLETE CBC AUTOMATED: CPT

## 2019-09-30 PROCEDURE — 93306 TTE W/DOPPLER COMPLETE: CPT

## 2019-09-30 PROCEDURE — 36415 COLL VENOUS BLD VENIPUNCTURE: CPT

## 2019-09-30 PROCEDURE — 93017 CV STRESS TEST TRACING ONLY: CPT

## 2019-09-30 PROCEDURE — 82962 GLUCOSE BLOOD TEST: CPT

## 2019-09-30 PROCEDURE — 84484 ASSAY OF TROPONIN QUANT: CPT

## 2019-09-30 PROCEDURE — 93010 ELECTROCARDIOGRAM REPORT: CPT

## 2019-09-30 PROCEDURE — 71275 CT ANGIOGRAPHY CHEST: CPT

## 2019-09-30 PROCEDURE — 93971 EXTREMITY STUDY: CPT | Mod: 26,LT

## 2019-09-30 PROCEDURE — 71045 X-RAY EXAM CHEST 1 VIEW: CPT | Mod: 26

## 2019-09-30 PROCEDURE — 80061 LIPID PANEL: CPT

## 2019-09-30 PROCEDURE — A9500: CPT

## 2019-09-30 PROCEDURE — 93971 EXTREMITY STUDY: CPT | Mod: LT

## 2019-09-30 PROCEDURE — 86803 HEPATITIS C AB TEST: CPT

## 2019-09-30 PROCEDURE — 99223 1ST HOSP IP/OBS HIGH 75: CPT

## 2019-09-30 RX ORDER — INSULIN DETEMIR 100/ML (3)
42 INSULIN PEN (ML) SUBCUTANEOUS
Qty: 0 | Refills: 0 | DISCHARGE

## 2019-09-30 RX ORDER — CIPROFLOXACIN LACTATE 400MG/40ML
1 VIAL (ML) INTRAVENOUS
Qty: 0 | Refills: 0 | DISCHARGE

## 2019-09-30 RX ORDER — ATORVASTATIN CALCIUM 80 MG/1
20 TABLET, FILM COATED ORAL AT BEDTIME
Refills: 0 | Status: DISCONTINUED | OUTPATIENT
Start: 2019-09-30 | End: 2019-10-01

## 2019-09-30 RX ORDER — DOCUSATE SODIUM 100 MG
100 CAPSULE ORAL
Refills: 0 | Status: DISCONTINUED | OUTPATIENT
Start: 2019-09-30 | End: 2019-10-01

## 2019-09-30 RX ORDER — FINASTERIDE 5 MG/1
5 TABLET, FILM COATED ORAL DAILY
Refills: 0 | Status: DISCONTINUED | OUTPATIENT
Start: 2019-09-30 | End: 2019-10-01

## 2019-09-30 RX ORDER — DEXTROSE 50 % IN WATER 50 %
25 SYRINGE (ML) INTRAVENOUS ONCE
Refills: 0 | Status: DISCONTINUED | OUTPATIENT
Start: 2019-09-30 | End: 2019-10-01

## 2019-09-30 RX ORDER — ASPIRIN/CALCIUM CARB/MAGNESIUM 324 MG
81 TABLET ORAL DAILY
Refills: 0 | Status: DISCONTINUED | OUTPATIENT
Start: 2019-09-30 | End: 2019-10-01

## 2019-09-30 RX ORDER — SODIUM CHLORIDE 9 MG/ML
1000 INJECTION, SOLUTION INTRAVENOUS
Refills: 0 | Status: DISCONTINUED | OUTPATIENT
Start: 2019-09-30 | End: 2019-10-01

## 2019-09-30 RX ORDER — INSULIN DETEMIR 100/ML (3)
44 INSULIN PEN (ML) SUBCUTANEOUS
Qty: 0 | Refills: 0 | DISCHARGE

## 2019-09-30 RX ORDER — FENOFIBRATE,MICRONIZED 130 MG
145 CAPSULE ORAL DAILY
Refills: 0 | Status: DISCONTINUED | OUTPATIENT
Start: 2019-09-30 | End: 2019-10-01

## 2019-09-30 RX ORDER — HEPARIN SODIUM 5000 [USP'U]/ML
3500 INJECTION INTRAVENOUS; SUBCUTANEOUS EVERY 6 HOURS
Refills: 0 | Status: DISCONTINUED | OUTPATIENT
Start: 2019-09-30 | End: 2019-10-01

## 2019-09-30 RX ORDER — DEXTROSE 50 % IN WATER 50 %
12.5 SYRINGE (ML) INTRAVENOUS ONCE
Refills: 0 | Status: DISCONTINUED | OUTPATIENT
Start: 2019-09-30 | End: 2019-10-01

## 2019-09-30 RX ORDER — INSULIN DETEMIR 100/ML (3)
60 INSULIN PEN (ML) SUBCUTANEOUS
Qty: 0 | Refills: 0 | DISCHARGE

## 2019-09-30 RX ORDER — GLUCAGON INJECTION, SOLUTION 0.5 MG/.1ML
1 INJECTION, SOLUTION SUBCUTANEOUS ONCE
Refills: 0 | Status: DISCONTINUED | OUTPATIENT
Start: 2019-09-30 | End: 2019-10-01

## 2019-09-30 RX ORDER — HEPARIN SODIUM 5000 [USP'U]/ML
5000 INJECTION INTRAVENOUS; SUBCUTANEOUS EVERY 12 HOURS
Refills: 0 | Status: DISCONTINUED | OUTPATIENT
Start: 2019-09-30 | End: 2019-09-30

## 2019-09-30 RX ORDER — SODIUM CHLORIDE 9 MG/ML
1000 INJECTION INTRAMUSCULAR; INTRAVENOUS; SUBCUTANEOUS
Refills: 0 | Status: DISCONTINUED | OUTPATIENT
Start: 2019-09-30 | End: 2019-10-01

## 2019-09-30 RX ORDER — SITAGLIPTIN AND METFORMIN HYDROCHLORIDE 500; 50 MG/1; MG/1
1 TABLET, FILM COATED ORAL
Qty: 0 | Refills: 0 | DISCHARGE

## 2019-09-30 RX ORDER — INSULIN LISPRO 100/ML
VIAL (ML) SUBCUTANEOUS
Refills: 0 | Status: DISCONTINUED | OUTPATIENT
Start: 2019-09-30 | End: 2019-10-01

## 2019-09-30 RX ORDER — TAMSULOSIN HYDROCHLORIDE 0.4 MG/1
0.4 CAPSULE ORAL AT BEDTIME
Refills: 0 | Status: DISCONTINUED | OUTPATIENT
Start: 2019-09-30 | End: 2019-10-01

## 2019-09-30 RX ORDER — INSULIN LISPRO 100/ML
VIAL (ML) SUBCUTANEOUS AT BEDTIME
Refills: 0 | Status: DISCONTINUED | OUTPATIENT
Start: 2019-09-30 | End: 2019-10-01

## 2019-09-30 RX ORDER — DEXTROSE 50 % IN WATER 50 %
15 SYRINGE (ML) INTRAVENOUS ONCE
Refills: 0 | Status: DISCONTINUED | OUTPATIENT
Start: 2019-09-30 | End: 2019-10-01

## 2019-09-30 RX ORDER — NITROGLYCERIN 6.5 MG
1 CAPSULE, EXTENDED RELEASE ORAL ONCE
Refills: 0 | Status: COMPLETED | OUTPATIENT
Start: 2019-09-30 | End: 2019-09-30

## 2019-09-30 RX ORDER — HEPARIN SODIUM 5000 [USP'U]/ML
INJECTION INTRAVENOUS; SUBCUTANEOUS
Qty: 25000 | Refills: 0 | Status: DISCONTINUED | OUTPATIENT
Start: 2019-09-30 | End: 2019-10-01

## 2019-09-30 RX ORDER — PANTOPRAZOLE SODIUM 20 MG/1
40 TABLET, DELAYED RELEASE ORAL ONCE
Refills: 0 | Status: DISCONTINUED | OUTPATIENT
Start: 2019-09-30 | End: 2019-10-01

## 2019-09-30 RX ORDER — EMPAGLIFLOZIN 10 MG/1
0 TABLET, FILM COATED ORAL
Qty: 0 | Refills: 0 | DISCHARGE

## 2019-09-30 RX ORDER — HEPARIN SODIUM 5000 [USP'U]/ML
7500 INJECTION INTRAVENOUS; SUBCUTANEOUS EVERY 6 HOURS
Refills: 0 | Status: DISCONTINUED | OUTPATIENT
Start: 2019-09-30 | End: 2019-10-01

## 2019-09-30 RX ORDER — ACETAMINOPHEN 500 MG
650 TABLET ORAL EVERY 6 HOURS
Refills: 0 | Status: DISCONTINUED | OUTPATIENT
Start: 2019-09-30 | End: 2019-10-01

## 2019-09-30 RX ADMIN — SODIUM CHLORIDE 80 MILLILITER(S): 9 INJECTION INTRAMUSCULAR; INTRAVENOUS; SUBCUTANEOUS at 21:46

## 2019-09-30 RX ADMIN — Medication 1 INCH(S): at 12:49

## 2019-09-30 RX ADMIN — HEPARIN SODIUM 1700 UNIT(S)/HR: 5000 INJECTION INTRAVENOUS; SUBCUTANEOUS at 21:46

## 2019-09-30 NOTE — H&P ADULT - HISTORY OF PRESENT ILLNESS
75 y/o male with a PMHx of BPH, eczema, hypertriglyceridemia, non-hodgkin's lymphoma with radiation, DM type 2 presents to the ED c/o constant chest pain since Saturday. Pt also has tingling down his left arm. Pt states the pain is the same whether he is walking or sitting. Pt was helping his friend move in [place] last week flying back to  9/27. Pt denies leg swelling or SOB. Pt has taken some aspirin when he woke up at night. Pt had a stress test a couple years ago. Pt has no stents in his heart and has had similar pain a couple years ago where he came to the ED and everything was found to be normal. Denies coughing, fevers, nausea. Pt is a 75 y/o male with a PMHx of BPH, eczema, hypertriglyceridemia, non-hodgkin's lymphoma with radiation, DM type 2 presents to the ED c/o constant chest pain 2/10 since three days.  Pt also has tingling /numbness down his left arm and hand.  Pt states the pain is the same whether he is walking or sitting.   Pt was in  [place] last week flying back to  9/27.  He flew to [place] in early Sept.   Pt denies leg swelling or SOB.   Pt has took 4 baby  aspirin when he woke up at 3am  this morning.    Pt reports Left thigh crampinf , 5 days ago which lasted longer than his usual "charley horse" cramping.         Pt had a stress test a couple years ago. Pt has no stents in his heart and has had similar pain a couple years ago where he came to the ED and everything was found to be normal. Denies coughing, fevers, nausea.

## 2019-09-30 NOTE — H&P ADULT - NSHPSOCIALHISTORY_GEN_ALL_CORE
. Nonsmoker. Pt .is a retired .   Nonsmoker, 1 -2 cigars per year. No ETOH/drug use. Pt lives with his wife.  He does not exercise.

## 2019-09-30 NOTE — H&P ADULT - NSHPPHYSICALEXAM_GEN_ALL_CORE
ICU Vital Signs Last 24 Hrs  T(C): 36.7 (30 Sep 2019 15:35), Max: 36.8 (30 Sep 2019 11:39)  T(F): 98.1 (30 Sep 2019 15:35), Max: 98.2 (30 Sep 2019 11:39)  HR: 54 (30 Sep 2019 15:35) (47 - 56)  BP: 138/67 (30 Sep 2019 15:35) (137/63 - 157/94)    RR: 17 (30 Sep 2019 15:35) (17 - 18)  SpO2: 99% (30 Sep 2019 15:35) (98% - 99%)

## 2019-09-30 NOTE — ED ADULT NURSE NOTE - CHPI ED NUR SYMPTOMS NEG
no chills/no syncope/no vomiting/no dizziness/no back pain/no fever/no nausea/no shortness of breath/no diaphoresis/no congestion

## 2019-09-30 NOTE — ED ADULT NURSE NOTE - OBJECTIVE STATEMENT
Pt is a 74y male, A & O x 4, VSS presents to ED w/ left chest wall pain x a few days, pt denies shortness of breath. Pt denies cardiac hx. Pt denies trauma.

## 2019-09-30 NOTE — H&P ADULT - NSICDXPASTMEDICALHX_GEN_ALL_CORE_FT
PAST MEDICAL HISTORY:  Blood in stool     BPH (benign prostatic hyperplasia)     Eczema     Hypertriglyceridemia     Non-Hodgkin lymphoma in neck s/p radiation    Pure hypercholesterolemia     Screening for colon cancer     Type 2 diabetes mellitus     Upper respiratory infection currently on levofloxacin

## 2019-09-30 NOTE — ED ADULT TRIAGE NOTE - CHIEF COMPLAINT QUOTE
Patient presents with chest pain since Saturday. Reports tingling to left arm and hand. Denies shortness of breath, denies nausea or vomiting

## 2019-09-30 NOTE — ED STATDOCS - OBJECTIVE STATEMENT
75 y/o male with a PMHx of BPH, eczema, hypertriglyceridemia, non-hodgkin's lymphoma with radiation, DM type 2 presents to the ED c/o constant chest pain since Saturday. Pt also has tingling down his left arm. Pt states the pain is the same whether he is walking or sitting. Pt was helping his friend move in [place] last week flying back to [place] 9[place]27. Pt denies leg swelling or SOB. Pt has taken some aspirin when he woke up at night. Pt had a stress test a couple years ago. Pt has no stents in his heart and has had similar pain a couple years ago where he came to the ED and everything was found to be normal. Denies coughing, fevers, nausea. Cardio: Dr. Palla. PCP: Dr. Be. Nonsmoker.

## 2019-09-30 NOTE — H&P ADULT - ASSESSMENT
Chest pain  DM  - cont ASA, statin  - repeat EKG, troponins  - apprec cardiology consult by Dr. Palla  - INsulin sliding scale  - DVT prophylaxis : heparin  - IMPROVE VTE Individual Risk Assessment    RISK                                                                Points    [  ] Previous VTE                                                  3    [  ] Thrombophilia                                               2    [  ] Lower limb paralysis                                      2        (unable to hold up >15 seconds)      [  ] Current Cancer                                              2         (within 6 months)    [  ] Immobilization > 24 hrs                                1    [  ] ICU/CCU stay > 24 hours                              1    [ X ] Age > 60                                                      1    IMPROVE VTE Score _____1____    IMPROVE Score 0-1: Low Risk, No VTE prophylaxis required for most patients, encourage ambulation.   IMPROVE Score 2-3: At risk, pharmacologic VTE prophylaxis is indicated for most patients (in the absence of a contraindication)  IMPROVE Score > or = 4: High Risk, pharmacologic VTE prophylaxis is indicated for most patients (in the absence of a contraindication) Pt is admitted w/  constant left Chest pain  Hx L thigh cramping  Recent plane travel  DM  - cont ASA, statin  - repeat EKG, troponins  - apprec cardiology consult by Dr. Palla  - pt planned for Nuc Strss test  - Dupplex is neg for DVT  - IVF, consider CTA to r/o PE  - Renal consult prior to CTA  - will tx with heparin for now, discussed w/Dr. Palla  - INsulin sliding scale  - DVT prophylaxis : heparin  - IMPROVE VTE Individual Risk Assessment    RISK                                                                Points    [  ] Previous VTE                                                  3    [  ] Thrombophilia                                               2    [  ] Lower limb paralysis                                      2        (unable to hold up >15 seconds)      [  ] Current Cancer                                              2         (within 6 months)    [  ] Immobilization > 24 hrs                                1    [  ] ICU/CCU stay > 24 hours                              1    [ X ] Age > 60                                                      1    IMPROVE VTE Score _____1____    IMPROVE Score 0-1: Low Risk, No VTE prophylaxis required for most patients, encourage ambulation.   IMPROVE Score 2-3: At risk, pharmacologic VTE prophylaxis is indicated for most patients (in the absence of a contraindication)  IMPROVE Score > or = 4: High Risk, pharmacologic VTE prophylaxis is indicated for most patients (in the absence of a contraindication)

## 2019-09-30 NOTE — ED STATDOCS - PROGRESS NOTE DETAILS
73 yo male with a PMH of htn, hld, dm, non hodgkin's lymphoma s/p radiation therapy presents with L sided chest pain since saturday, constant, radiating down the L arm. Pt states he went to [place] last week to help his brother-in-law moves his things from his house to storage and came back last friday. Denies leg swelling, sob, back pain, n/v/d, fever, cough. PMD= gross, Cardio= anto-> palla. Labs, CE, DD, cxr, ekg, Reeval. -Pop Clarke PA-C Spoke with Dr. Be who would like me to speak with Dr. palla. Spoke with Dr. Palla who would like the pt to be admitted. WIll admit the pt under Dr. Be. Dr. palla came to see pt. -Pop Clarke PA-C Dr. palla came to see pt. -Pop Clarke PA-C    Recommended to give pt protonix since pt has a h/o of gerd and took asa PTA. -Pop Clarke PA-C

## 2019-09-30 NOTE — ED STATDOCS - CLINICAL SUMMARY MEDICAL DECISION MAKING FREE TEXT BOX
74 with DM HTN c/o chest pain. Workup for ACS and PE and admit to hospital. Took aspirin prior to arrival.

## 2019-09-30 NOTE — CONSULT NOTE ADULT - PROBLEM SELECTOR RECOMMENDATION 9
patient with RF for CAD with chest pain , which appears to be atypical , however better with nitro , would obtain serial ekg , enzymes , echocardiogram   will obtain exercise nuclear if troponin  negative ,  explained to patient. add protonix 40 mg pod aily patient with RF for CAD with chest pain , which appears to be atypical , however better with nitro , would obtain serial ekg , enzymes , echocardiogram   will obtain exercise nuclear if troponin  negative ,  explained to patient. add protonix 40 mg pod aily, ecotrin no BB due to bradycardia

## 2019-09-30 NOTE — ED STATDOCS - CPE ED [facility]
Primary Care Physician:  Rama Uriarte MD    Reason for Visit:  Postop exam     Chief Complaint   Patient presents with   • Surgical Followup     Post-Robot Assisted Abdominal sacral colpopexy       Subjective:  51 y/o female with a h/o pelvic prolapse and stress incontinence, s/p robot assisted abdominal sacral colpopexy, right ovarian cystectomy, ureterolysis, and mid urethral sling 07/12/2018. Of note, patient has had pseudomonas UTI postop with need for multiple rounds of antibiotics to clear. Repeat urine culture 08/08 was no growth. She had been initiated back on her estrogen cream twice weekly.    Of note, when seen 07/31/2018, she admitted to walking 2 miles per day without permission to do so. She has since been strongly advised to follow postop restrictions appropriately, and failure to do so may result in postop complications.          Last seen 08/21/2018:  - She feels her UTI has resolved, but continues to void frequency q 30 min with increased urgency, but no leakage, and pad free  - Up 2x/night.   - Continues application of estrogen twice weekly without issue  - Following postop restrictions as advised.        With further questioning, her urinary frequency is the SAME as preop and she drinks excessive fluids!!!     Water: 120 oz/day      Juice: 1/2-1 glass/day      Milk: 1 glass/day  Coffee: 6 cups/day       Tea: Winter only     Soda: None         Plan:  - Dipstick negative  - Continue estrogen twice weekly for prevention of UTIs  - Follow postop restrictions as advised  - Urinary frequency/urgency was the same as preop and drinking excessive fluids/caffeine per day  - Complete VD to assess fluid/bladder volumes and bring to return visit  - Start to slowly reduce fluids/caffeine by 1/2 and reassess thereafter  - Followup in 2-3 weeks for repeat postop exam         Here to undergo repeat postop followup with exam.    Of note, patient was recently in Urgent Care 09/08/2018 for suspected UTI. Urinalysis  was obtained, which appeared infected, but no culture. She was placed on Cipro x 7 days. She was offered a sooner followup with Dr. Banda, but because she was improving, she elected to decline and followup today with me.      Patient presents today very frustrated She is extremely rude during the course of the visit to not only my staff, but myself. She is refusing all care today, including her final postop exam. She is adamant that every time she presents to our office and gets an exam, she gets a UTI. She is also adamant that any time she uses her estrogen, she gets a UTI, and has since stopped her estrogen for a few weeks. She is also upset that she was told to decrease her fluids first before any consideration of a medication for her urinary urgency/frequency.     She is questioned as to why she declined a sooner visit to discuss her concerns with Dr. Banda and states that I am Dr. Banda's partner and should be able to report her concerns to Dr. Banda.    She is [place] and plans to hike regardless of her postop restrictions.    Denies UTI symptoms today and bladder control stable, as she has not reduced her fluids.        no dysuria.  no gross hematuria, no fevers or chills.  no nausea or vomiting.  no abdominal pain.  no flank pain.  no sensation of incomplete bladder emptying.  no cloudy urine or malodorous urine.       Review of Systems:     General:  No loss of appetite, no weight loss, no fever, no chills, no sweats, no fatigue.   Eyes:  No blurry/double vision, no redness, no swelling, no discharge.  HENT:  No headache, no earache, no hearing loss, no tinnitus, no sinus issues, no sore throat.  Respiratory:  No cough, no colds, no sputum, no SOB, no wheeze, no postnasal drip.  CV:  No chest pain or angina, no SOB, no SOSA, no palpitations.  GI:  No nausea, no vomiting, no diarrhea, no constipation, no heartburn, no pain.  :  See HPI.  Heme:  No swollen glands, no  bleeding or bruising problems.   MSK:  No swelling, no new joint issues, no new weakness.  Skin:  No rash, no insect bites, no itch, no hives.  Neuro:  No dizziness, no lightheadedness, no syncope, no numbness, no tingling.  Psych:  No depression, no anxiety, no insomnia, no concentration issues.  All/IMM:  No conjunctivitis, no seasonal allergies.  Endo:  No polyuria, no polydipsia, no weight loss or weight gain, no heat intolerance, no cold intolerance.    Medication:  Current Outpatient Prescriptions   Medication Sig Dispense Refill   • omeprazole (PRILOSEC) 40 MG capsule TAKE 1 CAPSULE BY MOUTH ONE TIME A DAY  90 capsule 0   • predniSONE (DELTASONE) 20 MG tablet 20 mg daily for 4 days 4 tablet 0   • ACETAMINOPHEN PO      • Sennosides (SENOKOT EXTRA STRENGTH PO)      • estradiol (ESTRACE VAGINAL) 0.1 MG/GM vaginal cream Place 1 g vaginally 2 days a week. Hold until post-surgical follow up 42.5 g 11   • Nutritional Supplements (QUINOA KALE & HEMP PO) Take by mouth daily.     • Valacyclovir HCl 1000 MG Tab TAKE 1 TABLET BY MOUTH ONE TIME A DAY  (Patient taking differently: Takes 1/2 tab twice daily) 30 tablet 5   • Ascorbic Acid (VITAMIN C) 500 MG tablet Take 500 mg by mouth daily.     • Garlic 1000 MG Cap Take by mouth daily.     • Omega-3 Fatty Acids (FISH OIL) 1000 MG capsule Take 2 g by mouth daily.     • metroNIDAZOLE (METROGEL) 0.75 % gel apply 1 application topically twice daily (Patient taking differently: apply 1 application topically twice daily, uses every other day on face after showering) 45 g 0   • Turmeric 500 MG Tab      • latanoprost (XALATAN) 0.005 % ophthalmic solution INSTILL 1 DROP IN EACH EYE AT BEDTIME  2.5 mL 11   • cyanocobalamin (VITAMIN B-12) 250 MCG tablet Take 250 mcg by mouth every other day.     • albuterol 108 (90 Base) MCG/ACT inhaler Inhale 2 puffs into the lungs every 4 hours as needed for Shortness of Breath or Wheezing. 2 Inhaler 12   • VITAMIN D, ERGOCALCIFEROL, PO Take 1,000  Int'l Units by mouth 2 times daily.      • ibuprofen (MOTRIN) 400 MG tablet Take 400 mg by mouth every 6 hours as needed for Pain.     • fexofenadine (ALLEGRA) 180 MG tablet Take 180 mg by mouth daily.     • fluticasone (FLONASE) 50 MCG/ACT nasal spray Spray 2 sprays in each nostril daily. 16 g 12   • Spacer/Aero-Holding Chambers JEMIMA Use daily with MDI 1 Device 1   • MULTI-VITAMIN PO TABS one a day       No current facility-administered medications for this encounter.        Problem List:  Patient Active Problem List   Diagnosis   • Family history of malignant neoplasm of breast   • Family history of ovarian cancer   • Tendinitis of right shoulder   • Glaucoma         Physical Exam:    There were no vitals filed for this visit.    General:  Well nourished, well groomed, NAD.  HEENT:  Conjunctivae/lids WNL, no erythema, no discharge, PEERLA, EOMI.  Respiratory:  Normal chest rise and fall, no cough, no wheeze, no stridor.  Chest:  Symmetrical chest rise and fall.  CV:  Capillary refill less than 3 seconds.  :  REFUSED  Neuro:  Cranial nerves II-XII grossly intact and symmetrical.  Psych:  Judgment intact, alert and oriented to person, place and time, recent/remote memory appropriate, mood/affect are ANGRY.         Imaging:  Reviewed    Labs:  Reviewed     Urine Panel  Lab Results   Component Value Date    5UNITR NEGATIVE 08/21/2018    UKET NEGATIVE 09/08/2018    UPROT NEGATIVE 09/08/2018    URBC SMALL (A) 09/08/2018    UBILI NEGATIVE 09/08/2018    UPH 5.5 09/08/2018    USPG 1.010 09/08/2018    UBACTR LARGE (A) 09/08/2018       Procedures:  None     Assessment and Plan:    52 year old female with a h/o pelvic prolapse and stress incontinence, s/p robot assisted abdominal sacral colpopexy, right ovarian cystectomy, ureterolysis, and mid urethral sling 07/12/2018:          1. Prolapse/CELINA:  - Surgical site was healing well, emptying to completion, and dry at her previous visit 08/21/2018, but now refusing repeat exam  (see HPI) d/t frustration of postop UTIs    - Leaving [place] tomorrow with fear of having UTI for her trip    - Advised her that her postop exams are not the source of her UTIs    - Advised her that although she is outside of the normal postoperative period, her postop restrictions have remained as she did not follow them initially, and had complicated UTIs in her postoperative course    - Her postoperative restrictions still remain (No heavy lifting > 5 lbs, abdominal straining, or strenuous activity, Nothing per vagina. Ok to shower, but no bath/soaking), as she is refusing a final postop exam, and will not be cleared until she chooses to allow us to perform such    - Despite recommendations, she is choosing to [place] and plans to hike and engage in normal activity    - Strongly reminded of the potential for surgical failure and she understands this      - No heavy lifting > 5 lbs, abdominal straining, or strenuous activity  - Nothing per vagina  - Ok to shower, but no bath             2. Postoperative Pseudomonas UTIs:   - Dip negative today  - Was advised to continue estrogen twice weekly, but she discontinued, as she feels application of estrogen is a continributingsource of her UTIs  - Time spent discussing the importance and indication for transvaginal estrogen, but she refuses          3. Urinary urgency/frequency:  - Same as preop  - Voids every 30 min, but drinks excessive fluids  - Refuses to complete a voiding diary as she is upset with recommendations to reduce her fluids/caffeine  - She understand no intervention until her fluids are under better control               RTC 10/03/2018 for further discussion/evaluation with Dr. Banda    normal...

## 2019-10-01 ENCOUNTER — TRANSCRIPTION ENCOUNTER (OUTPATIENT)
Age: 74
End: 2019-10-01

## 2019-10-01 VITALS
SYSTOLIC BLOOD PRESSURE: 154 MMHG | DIASTOLIC BLOOD PRESSURE: 65 MMHG | RESPIRATION RATE: 16 BRPM | TEMPERATURE: 98 F | OXYGEN SATURATION: 95 % | HEART RATE: 56 BPM

## 2019-10-01 LAB
ANION GAP SERPL CALC-SCNC: 6 MMOL/L — SIGNIFICANT CHANGE UP (ref 5–17)
APTT BLD: 66.1 SEC — HIGH (ref 27.5–36.3)
APTT BLD: 84.3 SEC — HIGH (ref 27.5–36.3)
BUN SERPL-MCNC: 27 MG/DL — HIGH (ref 7–23)
CALCIUM SERPL-MCNC: 9.7 MG/DL — SIGNIFICANT CHANGE UP (ref 8.5–10.1)
CHLORIDE SERPL-SCNC: 111 MMOL/L — HIGH (ref 96–108)
CHOLEST SERPL-MCNC: 106 MG/DL — SIGNIFICANT CHANGE UP (ref 10–199)
CO2 SERPL-SCNC: 27 MMOL/L — SIGNIFICANT CHANGE UP (ref 22–31)
CREAT SERPL-MCNC: 1.41 MG/DL — HIGH (ref 0.5–1.3)
GLUCOSE SERPL-MCNC: 127 MG/DL — HIGH (ref 70–99)
HCT VFR BLD CALC: 37.6 % — LOW (ref 39–50)
HCV AB S/CO SERPL IA: 0.1 S/CO — SIGNIFICANT CHANGE UP (ref 0–0.99)
HCV AB SERPL-IMP: SIGNIFICANT CHANGE UP
HDLC SERPL-MCNC: 25 MG/DL — LOW
HGB BLD-MCNC: 12 G/DL — LOW (ref 13–17)
LIPID PNL WITH DIRECT LDL SERPL: 15 MG/DL — SIGNIFICANT CHANGE UP
MCHC RBC-ENTMCNC: 30 PG — SIGNIFICANT CHANGE UP (ref 27–34)
MCHC RBC-ENTMCNC: 31.9 GM/DL — LOW (ref 32–36)
MCV RBC AUTO: 94 FL — SIGNIFICANT CHANGE UP (ref 80–100)
PLATELET # BLD AUTO: 217 K/UL — SIGNIFICANT CHANGE UP (ref 150–400)
POTASSIUM SERPL-MCNC: 4.2 MMOL/L — SIGNIFICANT CHANGE UP (ref 3.5–5.3)
POTASSIUM SERPL-SCNC: 4.2 MMOL/L — SIGNIFICANT CHANGE UP (ref 3.5–5.3)
RBC # BLD: 4 M/UL — LOW (ref 4.2–5.8)
RBC # FLD: 13.1 % — SIGNIFICANT CHANGE UP (ref 10.3–14.5)
SODIUM SERPL-SCNC: 144 MMOL/L — SIGNIFICANT CHANGE UP (ref 135–145)
TOTAL CHOLESTEROL/HDL RATIO MEASUREMENT: 4.2 RATIO — SIGNIFICANT CHANGE UP (ref 3.4–9.6)
TRIGL SERPL-MCNC: 328 MG/DL — HIGH (ref 10–149)
TROPONIN I SERPL-MCNC: 0.03 NG/ML — SIGNIFICANT CHANGE UP (ref 0.01–0.04)
WBC # BLD: 7.88 K/UL — SIGNIFICANT CHANGE UP (ref 3.8–10.5)
WBC # FLD AUTO: 7.88 K/UL — SIGNIFICANT CHANGE UP (ref 3.8–10.5)

## 2019-10-01 PROCEDURE — 78452 HT MUSCLE IMAGE SPECT MULT: CPT | Mod: 26

## 2019-10-01 PROCEDURE — 93018 CV STRESS TEST I&R ONLY: CPT

## 2019-10-01 PROCEDURE — 99239 HOSP IP/OBS DSCHRG MGMT >30: CPT

## 2019-10-01 PROCEDURE — 93010 ELECTROCARDIOGRAM REPORT: CPT

## 2019-10-01 PROCEDURE — 71275 CT ANGIOGRAPHY CHEST: CPT | Mod: 26

## 2019-10-01 PROCEDURE — 99233 SBSQ HOSP IP/OBS HIGH 50: CPT

## 2019-10-01 PROCEDURE — 93016 CV STRESS TEST SUPVJ ONLY: CPT

## 2019-10-01 RX ADMIN — Medication 1 INCH(S): at 11:53

## 2019-10-01 RX ADMIN — HEPARIN SODIUM 1700 UNIT(S)/HR: 5000 INJECTION INTRAVENOUS; SUBCUTANEOUS at 14:28

## 2019-10-01 RX ADMIN — Medication 81 MILLIGRAM(S): at 12:17

## 2019-10-01 RX ADMIN — HEPARIN SODIUM 1700 UNIT(S)/HR: 5000 INJECTION INTRAVENOUS; SUBCUTANEOUS at 06:04

## 2019-10-01 RX ADMIN — FINASTERIDE 5 MILLIGRAM(S): 5 TABLET, FILM COATED ORAL at 12:17

## 2019-10-01 RX ADMIN — SODIUM CHLORIDE 80 MILLILITER(S): 9 INJECTION INTRAMUSCULAR; INTRAVENOUS; SUBCUTANEOUS at 11:58

## 2019-10-01 NOTE — DISCHARGE NOTE PROVIDER - HOSPITAL COURSE
Pt admitted to telemetry floor. Cardiac enzymes remained negative. Underwent Nuclear Stress Test which was unremarkable. He was seen by cardiology and nephrology. Underwend a CT angiogram which ruled out pulmonary embolism. Marion to be stable for discharge. Advised to follow up in office in 2 days for repeat renal blood work. Pt. agreaable.

## 2019-10-01 NOTE — CONSULT NOTE ADULT - SUBJECTIVE AND OBJECTIVE BOX
CHIEF COMPLAINT: chest pain from saturday     HPI: 74 year old male with hx of DM , HLD   prior treated NHL radiation who came to ER with complaining of having left chest discomfort started on Saturday ( 2days ) constant , associated with numbness of LUE , without any shortness of breath or palpitation , not related to exertion , patient came to ER today , where he was given nitropaste which made his symptoms better , patient denies prior cardiac history ,  patient did do some help in moving things around in the house a week ago ,        did have neck pain in the past ,     patient EKG showing  non specific T change without significant change from prior ,  patient does have hx of GERD in the past       PAST MEDICAL & SURGICAL HISTORY:  Upper respiratory infection: currently on levofloxacin  Non-Hodgkin lymphoma: in neck s/p radiation  Eczema  Screening for colon cancer  Blood in stool  BPH (benign prostatic hyperplasia)  Pure hypercholesterolemia  Hypertriglyceridemia  Type 2 diabetes mellitus  H/O oral surgery  S/P tonsillectomy: as a child      Allergies    Bydureon (Hives; Nausea)    Intolerances        SOCIAL HISTORY: non smoker , no alcohol    FAMILY HISTORY:  Family history of leukemia (Mother)  Family history of type 2 diabetes mellitus (Mother)      MEDICATIONS:  MEDICATIONS  (STANDING):    MEDICATIONS  (PRN):      REVIEW OF SYSTEMS:    CONSTITUTIONAL: No weakness, fevers or chills  EYES/ENT: No visual changes;  No vertigo or throat pain   NECK: No pain or stiffness  RESPIRATORY: No cough, wheezing, hemoptysis; No shortness of breath  CARDIOVASCULAR: mild left sided chest discomfort   GASTROINTESTINAL: No abdominal or epigastric pain. No nausea, vomiting, or hematemesis; No diarrhea or constipation. No melena or hematochezia.  GENITOURINARY: No dysuria, frequency or hematuria  NEUROLOGICAL: No numbness or weakness  SKIN: No itching, burning, rashes, or lesions   All other review of systems is negative unless indicated above    Vital Signs Last 24 Hrs  T(C): 36.8 (30 Sep 2019 11:39), Max: 36.8 (30 Sep 2019 11:39)  T(F): 98.2 (30 Sep 2019 11:39), Max: 98.2 (30 Sep 2019 11:39)  HR: 56 (30 Sep 2019 14:00) (47 - 56)  BP: 143/75 (30 Sep 2019 14:00) (137/63 - 157/94)  BP(mean): --  RR: 18 (30 Sep 2019 12:52) (18 - 18)  SpO2: 98% (30 Sep 2019 12:52) (98% - 98%)    I&O's Summary      PHYSICAL EXAM:    Constitutional: NAD, awake and alert, well-developed  HEENT: PERR, EOMI,  No oral cyananosis.  Neck:  supple,  No JVD  Respiratory: Breath sounds are clear bilaterally, No wheezing, rales or rhonchi  Cardiovascular: S1 and S2, regular rate and rhythm, ESM   Gastrointestinal: Bowel Sounds present, soft, nontender.   Extremities: No peripheral edema. No clubbing or cyanosis.  Vascular: 2+ peripheral pulses  Neurological: A/O x 3, no focal deficits  Musculoskeletal: no calf tenderness.  Skin: No rashes.      LABS: All Labs Reviewed:                        13.8   9.36  )-----------( 298      ( 30 Sep 2019 12:05 )             42.6     30 Sep 2019 12:05    142    |  110    |  26     ----------------------------<  190    4.2     |  25     |  1.52     Ca    9.8        30 Sep 2019 12:05  Mg     2.2       30 Sep 2019 12:05    TPro  7.3    /  Alb  3.7    /  TBili  0.5    /  DBili  x      /  AST  24     /  ALT  34     /  AlkPhos  60     30 Sep 2019 12:05    PT/INR - ( 30 Sep 2019 12:05 )   PT: 12.3 sec;   INR: 1.10 ratio         PTT - ( 30 Sep 2019 12:05 )  PTT:31.7 sec  CARDIAC MARKERS ( 30 Sep 2019 12:05 )  0.015 ng/mL / x     / x     / x     / x          Blood Culture:    @ 12:05  Pro Bnp 221        RADIOLOGY/EK19  11:36 am  s inus bradycardia first degree AV block , left axis LVH NST T changes  9 no significant change from prior     < from: Xray Chest 1 View AP/PA. (19 @ 12:36) >  Impression: No active pulmonary disease.    < end of copied text >
NEPHROLOGY CONSULT  HPI:  Pt is a 73 y/o male with a PMHx of BPH, eczema, hypertriglyceridemia, non-hodgkin's lymphoma with radiation, DM type 2 presents to the ED c/o constant chest pain 2/10 since three days.  Pt also has tingling /numbness down his left arm and hand.  Pt states the pain is the same whether he is walking or sitting.   Pt was in  [place] last week flying back to [place] 9[place]27.  He flew to [place] in early Sept.   Pt denies leg swelling or SOB.   Pt has took 4 baby  aspirin when he woke up at 3am  this morning.    Pt reports Left thigh crampinf , 5 days ago which lasted longer than his usual "charley horse" cramping.   Pt had a stress test a couple years ago. Pt has no stents in his heart and has had similar pain a couple years ago where he came to the ED and everything was found to be normal. Denies coughing, fevers, nausea. (30 Sep 2019 16:55)  Pt interviewed and wife at bedside  Seen By Dr Marvin Balubena as outpt for DM, does not see a nephrologist   As above, had chest discomfort after several flights and came in to evaluate  PE  pt had nuclear stress which was negative  No sob or chest pains now  await CT angio chest w IV contrast to R/O PE      PAST MEDICAL & SURGICAL HISTORY:  Upper respiratory infection: currently on levofloxacin  Non-Hodgkin lymphoma: in neck s/p radiation  Eczema  Screening for colon cancer  Blood in stool  BPH (benign prostatic hyperplasia)  Pure hypercholesterolemia  Hypertriglyceridemia  Type 2 diabetes mellitus  H/O oral surgery  S/P tonsillectomy: as a child      FAMILY HISTORY:  Family history of leukemia  Family history of type 2 diabetes mellitus      MEDICATIONS  (STANDING):  aspirin enteric coated 81 milliGRAM(s) Oral daily  atorvastatin 20 milliGRAM(s) Oral at bedtime  dextrose 5%. 1000 milliLiter(s) (50 mL/Hr) IV Continuous <Continuous>  dextrose 50% Injectable 12.5 Gram(s) IV Push once  dextrose 50% Injectable 25 Gram(s) IV Push once  dextrose 50% Injectable 25 Gram(s) IV Push once  docusate sodium 100 milliGRAM(s) Oral two times a day  fenofibrate Tablet 145 milliGRAM(s) Oral daily  finasteride 5 milliGRAM(s) Oral daily  heparin  Infusion.  Unit(s)/Hr (17 mL/Hr) IV Continuous <Continuous>  insulin lispro (HumaLOG) corrective regimen sliding scale   SubCutaneous three times a day before meals  insulin lispro (HumaLOG) corrective regimen sliding scale   SubCutaneous at bedtime  pantoprazole  Injectable 40 milliGRAM(s) IV Push once  sodium chloride 0.9%. 1000 milliLiter(s) (80 mL/Hr) IV Continuous <Continuous>  tamsulosin 0.4 milliGRAM(s) Oral at bedtime    MEDICATIONS  (PRN):  acetaminophen   Tablet .. 650 milliGRAM(s) Oral every 6 hours PRN Mild Pain (1 - 3)  dextrose 40% Gel 15 Gram(s) Oral once PRN Blood Glucose LESS THAN 70 milliGRAM(s)/deciLiter  glucagon  Injectable 1 milliGRAM(s) IntraMuscular once PRN Glucose <70 milliGRAM(s)/deciLiter  heparin  Injectable 7500 Unit(s) IV Push every 6 hours PRN For aPTT less than 40  heparin  Injectable 3500 Unit(s) IV Push every 6 hours PRN For aPTT between 40 - 57      Allergies    Bydureon (Hives; Nausea)    Intolerances        I&O's Summary        REVIEW OF SYSTEMS:    CONSTITUTIONAL:  As per HPI.  CONSTITUTIONAL: No weakness, fevers or chills  EYES/ENT: No visual changes;  No vertigo or throat pain   NECK: No pain or stiffness  CARDIOVASCULAR: No chest pain or palpitations  GASTROINTESTINAL: No abdominal or epigastric pain. No nausea, vomiting, or hematemesis; No diarrhea or constipation. No melena or hematochezia.  GENITOURINARY: No dysuria, frequency or hematuria  NEUROLOGICAL: No numbness or weakness  SKIN: No itching, burning, rashes, or lesions   All other review of systems is negative unless indicated above      Vital Signs Last 24 Hrs  T(C): 36.8 (01 Oct 2019 11:06), Max: 37.1 (30 Sep 2019 19:08)  T(F): 98.3 (01 Oct 2019 11:06), Max: 98.8 (30 Sep 2019 19:08)  HR: 56 (01 Oct 2019 11:06) (47 - 56)  BP: 154/65 (01 Oct 2019 11:06) (127/60 - 157/94)  BP(mean): --  RR: 16 (01 Oct 2019 11:06) (16 - 18)  SpO2: 95% (01 Oct 2019 11:06) (95% - 99%)  Daily     Daily   I&O's Summary      PHYSICAL EXAM:    General:  Alert, well-developed ,No acute distress.    Neuro:  Alert and oriented to person, place, and time. Able to communicate  well. Cranial nerves 2-12 grossly intact. 5/5 strength in all  extremities bilaterally. Sensation intact in all extremities.  Appropriate affect.     HEENT:  No JVD, no masses, Eyes anicteric, No carotid bruits.No lymphadenopathy,    Cardiovascular:  Regular rate and rhythm, with normal S1 and S2. No murmurs, rubs,  or gallops. No JVD.     Lungs:  clear. no rales, no wheezing, .    Abdomen:  Normoactive bowel sounds. Soft, flat, non-tender, and non-distended.  No hepatosplenomegaly, positive bowel sounds    Skin:  Warm, dry, well-perfused. No rashes or other lesions.     Extremities:  2+ pulses in upper and lower extremities. No lower extremity pain or  edema; legs are symmetric in appearance.    LABS:                        12.0   7.88  )-----------( 217      ( 01 Oct 2019 04:17 )             37.6       144    |  111    |  27     ----------------------------<  127       01 Oct 2019 04:17  4.2     |  27     |  1.41     142    |  110    |  26     ----------------------------<  190       30 Sep 2019 12:05  4.2     |  25     |  1.52     Ca    9.7        01 Oct 2019 04:17  Ca    9.8        30 Sep 2019 12:05      Mg     2.2       30 Sep 2019 12:05      Ca    9.7      01 Oct 2019 04:17  Mg     2.2     09-30    TPro  7.3  /  Alb  3.7  /  TBili  0.5  /  DBili  x   /  AST  24  /  ALT  34  /  AlkPhos  60  09-30    PT/INR - ( 30 Sep 2019 12:05 )   PT: 12.3 sec;   INR: 1.10 ratio         PTT - ( 01 Oct 2019 11:44 )  PTT:66.1 sec

## 2019-10-01 NOTE — PROGRESS NOTE ADULT - SUBJECTIVE AND OBJECTIVE BOX
SUBJECTIVE:    CHIEF COMPLAINT:  Patient is a 74y old  Male who presents with a chief complaint of c pain (30 Sep 2019 16:55)      HPI:  Pt is a 75 y/o male with a PMHx of BPH, eczema, hypertriglyceridemia, non-hodgkin's lymphoma with radiation, DM type 2 presents to the ED c/o constant chest pain 2/10 since three days.  Pt also has tingling /numbness down his left arm and hand.  Pt states the pain is the same whether he is walking or sitting.   Pt was in  [place] last week flying back to  9/27.  He flew to [place] in early Sept.   Pt denies leg swelling or SOB.   Pt has took 4 baby  aspirin when he woke up at 3am on morning of admission. Pt reports Left thigh cramping , 6 days ago which lasted longer than his usual "charley horse" cramping.         Pt had a stress test a couple years ago. Pt has no stents in his heart and has had similar pain a couple years ago where he came to the ED and everything was found to be normal. Denies coughing, fevers, nausea. (30 Sep 2019 16:55)      Interval HPI and Overnight Events:    REVIEW OF SYSTEMS:  CONSTITUTIONAL: No weakness, fevers or chills  EYES/ENT: No visual changes;  No vertigo or throat pain   NECK: No pain or stiffness  RESPIRATORY: No cough, wheezing, hemoptysis; No shortness of breath  CARDIOVASCULAR: No chest pain or palpitations  GASTROINTESTINAL: No abdominal or epigastric pain. No nausea, vomiting, or hematemesis; No diarrhea or constipation. No melena or hematochezia.  GENITOURINARY: No dysuria, frequency or hematuria  NEUROLOGICAL: No numbness or weakness  SKIN: No itching, burning, rashes, or lesions   All other review of systems is negative unless indicated above    OBJECTIVE    Vital Signs Last 24 Hrs  T(C): 36.4 (01 Oct 2019 06:04), Max: 37.1 (30 Sep 2019 19:08)  T(F): 97.6 (01 Oct 2019 06:04), Max: 98.8 (30 Sep 2019 19:08)  HR: 55 (01 Oct 2019 06:04) (47 - 56)  BP: 127/60 (01 Oct 2019 06:04) (127/60 - 157/94)  BP(mean): --  RR: 16 (01 Oct 2019 06:04) (16 - 18)  SpO2: 99% (01 Oct 2019 06:04) (98% - 99%)    MEDICATIONS  (STANDING):  aspirin enteric coated 81 milliGRAM(s) Oral daily  atorvastatin 20 milliGRAM(s) Oral at bedtime  dextrose 5%. 1000 milliLiter(s) (50 mL/Hr) IV Continuous <Continuous>  dextrose 50% Injectable 12.5 Gram(s) IV Push once  dextrose 50% Injectable 25 Gram(s) IV Push once  dextrose 50% Injectable 25 Gram(s) IV Push once  docusate sodium 100 milliGRAM(s) Oral two times a day  fenofibrate Tablet 145 milliGRAM(s) Oral daily  finasteride 5 milliGRAM(s) Oral daily  heparin  Infusion.  Unit(s)/Hr (17 mL/Hr) IV Continuous <Continuous>  insulin lispro (HumaLOG) corrective regimen sliding scale   SubCutaneous three times a day before meals  insulin lispro (HumaLOG) corrective regimen sliding scale   SubCutaneous at bedtime  pantoprazole  Injectable 40 milliGRAM(s) IV Push once  sodium chloride 0.9%. 1000 milliLiter(s) (80 mL/Hr) IV Continuous <Continuous>  tamsulosin 0.4 milliGRAM(s) Oral at bedtime      LABS:                         12.0   7.88  )-----------( 217      ( 01 Oct 2019 04:17 )             37.6     10-01    144  |  111<H>  |  27<H>  ----------------------------<  127<H>  4.2   |  27  |  1.41<H>    Ca    9.7      01 Oct 2019 04:17  Mg     2.2     09-30    TPro  7.3  /  Alb  3.7  /  TBili  0.5  /  DBili  x   /  AST  24  /  ALT  34  /  AlkPhos  60  09-30      PT/INR - ( 30 Sep 2019 12:05 )   PT: 12.3 sec;   INR: 1.10 ratio         PTT - ( 01 Oct 2019 04:17 )  PTT:84.3 sec  CARDIAC MARKERS ( 01 Oct 2019 04:17 )  0.027 ng/mL / x     / x     / x     / x      CARDIAC MARKERS ( 30 Sep 2019 14:38 )  <0.015 ng/mL / x     / x     / x     / x      CARDIAC MARKERS ( 30 Sep 2019 12:05 )  0.015 ng/mL / x     / x     / x     / x        CAPILLARY BLOOD GLUCOSE  POCT Blood Glucose.: 111 mg/dL (01 Oct 2019 08:22)  POCT Blood Glucose.: 116 mg/dL (01 Oct 2019 06:19)  POCT Blood Glucose.: 187 mg/dL (30 Sep 2019 21:53)

## 2019-10-01 NOTE — PROGRESS NOTE ADULT - PROBLEM SELECTOR PLAN 1
appears atypical without exertional worsening , did have Stress ekg changes resolved quickly with normal myocardial perfusion scan , patient did travel  negatvie d dimer , venous doppler ? doubt PE , as patient has persistent chest discomfort , with obtain CT angio

## 2019-10-01 NOTE — PROGRESS NOTE ADULT - SUBJECTIVE AND OBJECTIVE BOX
CHIEF COMPLAINT: chest pain from saturday     HPI: 74 year old male with hx of DM , HLD   prior treated NHL radiation who came to ER with complaining of having left chest discomfort started on Saturday ( 2days ) constant , associated with numbness of LUE , without any shortness of breath or palpitation , not related to exertion , patient came to ER today , where he was given nitropaste which made his symptoms better , patient denies prior cardiac history ,  patient did do some help in moving things around in the house a week ago ,        did have neck pain in the past ,     patient EKG showing  non specific T change without significant change from prior ,  patient does have hx of GERD in the past     10/1/19 Patient  is feeling same mild chest discomfort , had negative troponin , did have normal LVEF On echo , had exercise stress test showed ST depression lateral leads persisted for less than 1 minutes  with normal myocardial perfusion scan ,  normal bp response  no exertional worsening of chest discomfort as per patient .        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 1000 milliLiter(s) (50 mL/Hr) IV Continuous <Continuous>  dextrose 50% Injectable 12.5 Gram(s) IV Push once  dextrose 50% Injectable 25 Gram(s) IV Push once  dextrose 50% Injectable 25 Gram(s) IV Push once  docusate sodium 100 milliGRAM(s) Oral two times a day  fenofibrate Tablet 145 milliGRAM(s) Oral daily  finasteride 5 milliGRAM(s) Oral daily  heparin  Infusion.  Unit(s)/Hr (17 mL/Hr) IV Continuous <Continuous>  insulin lispro (HumaLOG) corrective regimen sliding scale   SubCutaneous three times a day before meals  insulin lispro (HumaLOG) corrective regimen sliding scale   SubCutaneous at bedtime  pantoprazole  Injectable 40 milliGRAM(s) IV Push once  sodium chloride 0.9%. 1000 milliLiter(s) (80 mL/Hr) IV Continuous <Continuous>  tamsulosin 0.4 milliGRAM(s) Oral at bedtime    MEDICATIONS  (PRN):  acetaminophen   Tablet .. 650 milliGRAM(s) Oral every 6 hours PRN Mild Pain (1 - 3)  dextrose 40% Gel 15 Gram(s) Oral once PRN Blood Glucose LESS THAN 70 milliGRAM(s)/deciLiter  glucagon  Injectable 1 milliGRAM(s) IntraMuscular once PRN Glucose <70 milliGRAM(s)/deciLiter  heparin  Injectable 7500 Unit(s) IV Push every 6 hours PRN For aPTT less than 40  heparin  Injectable 3500 Unit(s) IV Push every 6 hours PRN For aPTT between 40 - 57    REVIEW OF SYSTEMS:    as above   All other review of systems is negative unless indicated above      Vital Signs Last 24 Hrs  T(C): 36.8 (01 Oct 2019 11:06), Max: 37.1 (30 Sep 2019 19:08)  T(F): 98.3 (01 Oct 2019 11:06), Max: 98.8 (30 Sep 2019 19:08)  HR: 56 (01 Oct 2019 11:06) (47 - 56)  BP: 154/65 (01 Oct 2019 11:06) (127/60 - 157/94)  BP(mean): --  RR: 16 (01 Oct 2019 11:06) (16 - 18)  SpO2: 95% (01 Oct 2019 11:06) (95% - 99%)    I&O's Summary      PHYSICAL EXAM:    Constitutional: NAD, awake and alert, well-developed  HEENT: PERR, EOMI,  No oral cyananosis.  Neck:  supple,  No JVD  Respiratory: Breath sounds are clear bilaterally, No wheezing, rales or rhonchi  Cardiovascular: S1 and S2, regular rate and rhythm, ESM   Gastrointestinal: Bowel Sounds present, soft, nontender.   Extremities: No peripheral edema. No clubbing or cyanosis.  Vascular: 2+ peripheral pulses  Neurological: A/O x 3, no focal deficits  Musculoskeletal: no calf tenderness.  Skin: No rashes.      LABS: All Labs Reviewed:                                   12.0   7.88  )-----------( 217      ( 01 Oct 2019 04:17 )             37.6     10    144  |  111<H>  |  27<H>  ----------------------------<  127<H>  4.2   |  27  |  1.41<H>    Ca    9.7      01 Oct 2019 04:17  Mg     2.2         TPro  7.3  /  Alb  3.7  /  TBili  0.5  /  DBili  x   /  AST  24  /  ALT  34  /  AlkPhos  60      CARDIAC MARKERS ( 01 Oct 2019 04:17 )  0.027 ng/mL / x     / x     / x     / x      CARDIAC MARKERS ( 30 Sep 2019 14:38 )  <0.015 ng/mL / x     / x     / x     / x      CARDIAC MARKERS ( 30 Sep 2019 12:05 )  0.015 ng/mL / x     / x     / x     / x          LIVER FUNCTIONS - ( 30 Sep 2019 12:05 )  Alb: 3.7 g/dL / Pro: 7.3 gm/dL / ALK PHOS: 60 U/L / ALT: 34 U/L / AST: 24 U/L / GGT: x           PT/INR - ( 30 Sep 2019 12:05 )   PT: 12.3 sec;   INR: 1.10 ratio         PTT - ( 01 Oct 2019 04:17 )  PTT:84.3 sec    10 Chol 106 LDL 15 HDL 25<L> Trig 328<H>      RADIOLOGY/EK19  11:36 am  s inus bradycardia first degree AV block , left axis LVH NST T changes  9 no significant change from prior     < from: Xray Chest 1 View AP/PA. (19 @ 12:36) >  Impression: No active pulmonary disease.    < end of copied text > CHIEF COMPLAINT: chest pain from saturday     HPI: 74 year old male with hx of DM , HLD   prior treated NHL radiation who came to ER with complaining of having left chest discomfort started on Saturday ( 2days ) constant , associated with numbness of LUE , without any shortness of breath or palpitation , not related to exertion , patient came to ER today , where he was given nitropaste which made his symptoms better , patient denies prior cardiac history ,  patient did do some help in moving things around in the house a week ago ,        did have neck pain in the past ,     patient EKG showing  non specific T change without significant change from prior ,  patient does have hx of GERD in the past     10/1/19 Patient  is feeling same mild chest discomfort , had negative troponin , did have normal LVEF On echo , had exercise stress test showed ST depression lateral leads persisted for less than 1 minutes  with normal myocardial perfusion scan ,  normal bp response  no exertional worsening of chest discomfort as per patient .        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 1000 milliLiter(s) (50 mL/Hr) IV Continuous <Continuous>  dextrose 50% Injectable 12.5 Gram(s) IV Push once  dextrose 50% Injectable 25 Gram(s) IV Push once  dextrose 50% Injectable 25 Gram(s) IV Push once  docusate sodium 100 milliGRAM(s) Oral two times a day  fenofibrate Tablet 145 milliGRAM(s) Oral daily  finasteride 5 milliGRAM(s) Oral daily  heparin  Infusion.  Unit(s)/Hr (17 mL/Hr) IV Continuous <Continuous>  insulin lispro (HumaLOG) corrective regimen sliding scale   SubCutaneous three times a day before meals  insulin lispro (HumaLOG) corrective regimen sliding scale   SubCutaneous at bedtime  pantoprazole  Injectable 40 milliGRAM(s) IV Push once  sodium chloride 0.9%. 1000 milliLiter(s) (80 mL/Hr) IV Continuous <Continuous>  tamsulosin 0.4 milliGRAM(s) Oral at bedtime    MEDICATIONS  (PRN):  acetaminophen   Tablet .. 650 milliGRAM(s) Oral every 6 hours PRN Mild Pain (1 - 3)  dextrose 40% Gel 15 Gram(s) Oral once PRN Blood Glucose LESS THAN 70 milliGRAM(s)/deciLiter  glucagon  Injectable 1 milliGRAM(s) IntraMuscular once PRN Glucose <70 milliGRAM(s)/deciLiter  heparin  Injectable 7500 Unit(s) IV Push every 6 hours PRN For aPTT less than 40  heparin  Injectable 3500 Unit(s) IV Push every 6 hours PRN For aPTT between 40 - 57    REVIEW OF SYSTEMS:    as above   All other review of systems is negative unless indicated above      Vital Signs Last 24 Hrs  T(C): 36.8 (01 Oct 2019 11:06), Max: 37.1 (30 Sep 2019 19:08)  T(F): 98.3 (01 Oct 2019 11:06), Max: 98.8 (30 Sep 2019 19:08)  HR: 56 (01 Oct 2019 11:06) (47 - 56)  BP: 154/65 (01 Oct 2019 11:06) (127/60 - 157/94)  BP(mean): --  RR: 16 (01 Oct 2019 11:06) (16 - 18)  SpO2: 95% (01 Oct 2019 11:06) (95% - 99%)    I&O's Summary      PHYSICAL EXAM:    Constitutional: NAD, awake and alert, well-developed  HEENT: PERR, EOMI,  No oral cyananosis.  Neck:  supple,  No JVD  Respiratory: Breath sounds are clear bilaterally, No wheezing, rales or rhonchi  Cardiovascular: S1 and S2, regular rate and rhythm, ESM   Gastrointestinal: Bowel Sounds present, soft, nontender.   Extremities: No peripheral edema. No clubbing or cyanosis.  Vascular: 2+ peripheral pulses  Neurological: A/O x 3, no focal deficits  Musculoskeletal: no calf tenderness.  Skin: No rashes.      LABS: All Labs Reviewed:                                   12.0   7.88  )-----------( 217      ( 01 Oct 2019 04:17 )             37.6     10    144  |  111<H>  |  27<H>  ----------------------------<  127<H>  4.2   |  27  |  1.41<H>    Ca    9.7      01 Oct 2019 04:17  Mg     2.2         TPro  7.3  /  Alb  3.7  /  TBili  0.5  /  DBili  x   /  AST  24  /  ALT  34  /  AlkPhos  60      CARDIAC MARKERS ( 01 Oct 2019 04:17 )  0.027 ng/mL / x     / x     / x     / x      CARDIAC MARKERS ( 30 Sep 2019 14:38 )  <0.015 ng/mL / x     / x     / x     / x      CARDIAC MARKERS ( 30 Sep 2019 12:05 )  0.015 ng/mL / x     / x     / x     / x          LIVER FUNCTIONS - ( 30 Sep 2019 12:05 )  Alb: 3.7 g/dL / Pro: 7.3 gm/dL / ALK PHOS: 60 U/L / ALT: 34 U/L / AST: 24 U/L / GGT: x           PT/INR - ( 30 Sep 2019 12:05 )   PT: 12.3 sec;   INR: 1.10 ratio         PTT - ( 01 Oct 2019 04:17 )  PTT:84.3 sec    10-01 Chol 106 LDL 15 HDL 25<L> Trig 328<H>      RADIOLOGY/EK19  11:36 am  s inus bradycardia first degree AV block , left axis LVH NST T changes  9 no significant change from prior     < from: Xray Chest 1 View AP/PA. (19 @ 12:36) >  Impression: No active pulmonary disease.    < end of copied text >  < from: NM Nuclear Stress Multiple (10.01.19 @ 10:30) >  IMPRESSION: Normal SPECT Myocardial Perfusion Imaging.    No scan evidence of reversible or fixed perfusion defects.    Normal left ventricular contractility with an ejection fraction of 57%   (Normal: 50% or greater).    No regional wall motion abnormalities.    Please refer to cardiac stress test report for maximum heart rate   achieved, EKG findings and symptoms during the procedure.      < end of copied text >

## 2019-10-01 NOTE — DISCHARGE NOTE PROVIDER - CARE PROVIDER_API CALL
Constantino Be)  Family Medicine  [address], [address] [address]  Phone: [phone]  Fax: [phone]  Follow Up Time:

## 2019-10-01 NOTE — CONSULT NOTE ADULT - ASSESSMENT
Pt is a 73 y/o male with a PMHx of BPH, eczema, hypertriglyceridemia, non-hodgkin's lymphoma with radiation, DM type 2 presents to the ED c/o constant chest pain 2/10 since three days.  Pt also has tingling /numbness down his left arm and hand.  Pt states the pain is the same whether he is walking or sitting.   Pt was in  [place] last week flying back to [place] 9/27.  He flew to [place] in early Sept.   Pt denies leg swelling or SOB.   Pt has took 4 baby  aspirin when he woke up at 3am  this morning.    Pt reports Left thigh cramping , 5 days ago which lasted longer than his usual "charley horse" cramping.   Pt had a stress test a couple years ago. Pt has no stents in his heart and has had similar pain a couple years ago where he came to the ED and everything was found to be normal. Denies coughing, fevers, nausea. (30 Sep 2019 16:55)  Pt interviewed and wife at bedside  Seen By Dr Marvin Balbuena as outpt for DM, does not see a nephrologist states baseline creat ~ 1.5  As above, had chest discomfort after several flights and came in to evaluate  PE  pt had nuclear stress which was negative  No sob or chest pains now  await CT angio chest w IV contrast to R/O PE  on IV heparin protocol    A/P  CKD creat at baseline CP after several consecutive flights, r/o PE  Mild improvement of creat with IVF  For Ct angio chest  On IVF, should be OK to have the Ct angio iv still planned for today  if dc d after a negative CT, may have labs in 1-2 days as outpt  Will f/u with me 1-2 weeks for nephrology care

## 2019-10-01 NOTE — PATIENT PROFILE ADULT - HAS THE PATIENT RECEIVED THE INFLUENZA VACCINE THIS SEASON?
Pt just saw Dr. Juarez on 10/17/18 and he has an appointment on 11/19/18 for blood pressure. Can he have refill due to this? Nancy Jacques, TC     no...

## 2019-10-01 NOTE — PROGRESS NOTE ADULT - ASSESSMENT
Pt is admitted w/  constant left Chest pain  Hx L thigh cramping  Recent plane travel  DM  - cont ASA, statin  - repeat EKG, troponins negtive  - Cardiology following  - Nuc Strss test in progres  - Dupplex is neg for DVT  - IVF, consider CTA to r/o PE  - Renal consult prior to CTA  - will tx with heparin for now, hospitalist discussed w/Dr. Palla  - INsulin sliding scale  - DVT prophylaxis : heparin  - Await results of stress testing. If negative may discharge to home later this afternoon.

## 2019-10-01 NOTE — DISCHARGE NOTE NURSING/CASE MANAGEMENT/SOCIAL WORK - PATIENT PORTAL LINK FT
You can access the FollowMyHealth Patient Portal offered by [facility] by registering at the following website: [email or web address]. By joining [facility]’s FollowMyHealth portal, you will also be able to view your health information using other applications (apps) compatible with our system.

## 2019-10-01 NOTE — PROVIDER CONTACT NOTE (OTHER) - SITUATION
Dr. Be office is aware that patient was admitted s/w [facility]
Office made aware of consult. Spoke with Ameena.

## 2019-10-01 NOTE — DISCHARGE NOTE PROVIDER - NSDCFUSCHEDAPPT_GEN_ALL_CORE_FT
IRENE HAYNES ; 10/04/2019 ; [facility] [address] [facility]  IRENE HAYNES ; 11/11/2019 ; [facility] [address] [facility] IRENE HAYNES ; 10/04/2019 ; [facility] [address] [facility]  IRENE HAYNES ; 11/11/2019 ; [facility] [address] [facility]

## 2019-10-03 ENCOUNTER — APPOINTMENT (OUTPATIENT)
Dept: FAMILY MEDICINE | Facility: CLINIC | Age: 74
End: 2019-10-03
Payer: MEDICARE

## 2019-10-03 VITALS
DIASTOLIC BLOOD PRESSURE: 70 MMHG | HEIGHT: 71 IN | WEIGHT: 209 LBS | BODY MASS INDEX: 29.26 KG/M2 | SYSTOLIC BLOOD PRESSURE: 168 MMHG

## 2019-10-03 PROCEDURE — 99496 TRANSJ CARE MGMT HIGH F2F 7D: CPT | Mod: 25

## 2019-10-03 PROCEDURE — 90662 IIV NO PRSV INCREASED AG IM: CPT

## 2019-10-03 PROCEDURE — G0008: CPT

## 2019-10-03 NOTE — HISTORY OF PRESENT ILLNESS
[Admitted on: ___] : The patient was admitted on [unfilled] [Discharged on ___] : discharged on [unfilled] [FreeTextEntry2] : Pt. went to  ED due to chest pain and numbness to L arm and hand. Pt. had EKG, chest XRay, CT Angiogram,  and bwk, all results were normal but pt. still feels the same symptoms. Pt. also needs bwk. to follow up on kidney functions

## 2019-10-03 NOTE — PLAN
[FreeTextEntry1] : Stay of Janumet until tomorrow, then may resume dose\par Continue to push fluids\par May follow up with cardiologist\par Renal function panel today\par Warm compresses to left shoulder\par Tylenol prn pain\par

## 2019-10-04 ENCOUNTER — NON-APPOINTMENT (OUTPATIENT)
Age: 74
End: 2019-10-04

## 2019-10-04 ENCOUNTER — APPOINTMENT (OUTPATIENT)
Dept: CARDIOLOGY | Facility: CLINIC | Age: 74
End: 2019-10-04
Payer: MEDICARE

## 2019-10-04 VITALS
WEIGHT: 209 LBS | DIASTOLIC BLOOD PRESSURE: 68 MMHG | BODY MASS INDEX: 29.26 KG/M2 | TEMPERATURE: 98 F | HEART RATE: 54 BPM | HEIGHT: 71 IN | SYSTOLIC BLOOD PRESSURE: 160 MMHG | OXYGEN SATURATION: 98 % | RESPIRATION RATE: 16 BRPM

## 2019-10-04 LAB
ANION GAP SERPL CALC-SCNC: 13 MMOL/L
BUN SERPL-MCNC: 22 MG/DL
CALCIUM SERPL-MCNC: 9.9 MG/DL
CHLORIDE SERPL-SCNC: 104 MMOL/L
CO2 SERPL-SCNC: 25 MMOL/L
CREAT SERPL-MCNC: 1.41 MG/DL
GLUCOSE SERPL-MCNC: 215 MG/DL
POTASSIUM SERPL-SCNC: 4.5 MMOL/L
SODIUM SERPL-SCNC: 142 MMOL/L

## 2019-10-04 PROCEDURE — 93000 ELECTROCARDIOGRAM COMPLETE: CPT

## 2019-10-04 PROCEDURE — 99215 OFFICE O/P EST HI 40 MIN: CPT

## 2019-10-04 NOTE — REASON FOR VISIT
[Follow-Up - From Hospitalization] : follow-up of a recent hospitalization for [Abnormal ECG] : an abnormal ECG [Chest Pain] : chest pain [Hyperlipidemia] : hyperlipidemia [Medication Management] : Medication management

## 2019-10-04 NOTE — HISTORY OF PRESENT ILLNESS
[FreeTextEntry1] :  74 year old male with hx of DM, HLD , prior treated NHL with radiation to neck who came for follow up after discharge from hospital . Patient was admitted with left sided constant chest pain mild not related to exertion , patient did travel long distance , did have negative cardiac enzymes , D nessa , patient Normal nuclear myocardiac perfusion scan with equivocal EKG changes on stress , patient did have CT angio of chest showed   patient does have very mild discomfort only when he thinks about it  not related to exertion , \par \par His blood work in hospital   creatinine 1.41     LDL 15 HDL 25   CT angio no acute PE   negative venous doppler

## 2019-10-04 NOTE — REVIEW OF SYSTEMS
[Fever] : no fever [Chills] : no chills [Blurry Vision] : no blurred vision [Earache] : no earache [Cough] : no cough [Urinary Frequency] : no change in urinary frequency [Abdominal Pain] : no abdominal pain [Impotence] : no impotence [Joint Pain] : no joint pain [Skin: A Rash] : no rash: [Muscle Cramps] : no muscle cramps [Confusion] : no confusion was observed [Dizziness] : no dizziness

## 2019-10-04 NOTE — ASSESSMENT
[FreeTextEntry1] : 74 year old male with above hx\par \par atypical chest pain , which appears to be reffered pain ? neck vs GERD   had negative nuclear perfusion scan , no PE , negative troponin ,  will give omeprazole 20 mg po daily \par \par Uncontrolled hypertension with hypertensive heart disease with abnormal EKG , would add norvasc 5 mg po daily , along with low salt diet , \par \par CKD  Mild , stable   . \par \par DM    controlled  HB A1c 7.2  strict DM

## 2019-10-04 NOTE — PHYSICAL EXAM
[General Appearance - Well Developed] : well developed [Normal Conjunctiva] : the conjunctiva exhibited no abnormalities [Normal Oral Mucosa] : normal oral mucosa [Normal Jugular Venous A Waves Present] : normal jugular venous A waves present [] : no respiratory distress [Respiration, Rhythm And Depth] : normal respiratory rhythm and effort [Auscultation Breath Sounds / Voice Sounds] : lungs were clear to auscultation bilaterally [Exaggerated Use Of Accessory Muscles For Inspiration] : no accessory muscle use [Chest Palpation] : palpation of the chest revealed no abnormalities [Lungs Percussion] : the lungs were normal to percussion [Heart Rate And Rhythm] : heart rate and rhythm were normal [Heart Sounds] : normal S1 and S2 [Arterial Pulses Normal] : the arterial pulses were normal [Edema] : no peripheral edema present [Veins - Varicosity Changes] : no varicosital changes were noted in the lower extremities [Systolic grade ___/6] : A grade [unfilled]/6 systolic murmur was heard. [Bowel Sounds] : normal bowel sounds [Abnormal Walk] : normal gait [Abdomen Soft] : soft [Nail Clubbing] : no clubbing of the fingernails [Skin Color & Pigmentation] : normal skin color and pigmentation [Oriented To Time, Place, And Person] : oriented to person, place, and time

## 2019-10-08 ENCOUNTER — RX RENEWAL (OUTPATIENT)
Age: 74
End: 2019-10-08

## 2019-10-10 DIAGNOSIS — E78.00 PURE HYPERCHOLESTEROLEMIA, UNSPECIFIED: ICD-10-CM

## 2019-10-10 DIAGNOSIS — Z88.8 ALLERGY STATUS TO OTHER DRUGS, MEDICAMENTS AND BIOLOGICAL SUBSTANCES STATUS: ICD-10-CM

## 2019-10-10 DIAGNOSIS — I12.9 HYPERTENSIVE CHRONIC KIDNEY DISEASE WITH STAGE 1 THROUGH STAGE 4 CHRONIC KIDNEY DISEASE, OR UNSPECIFIED CHRONIC KIDNEY DISEASE: ICD-10-CM

## 2019-10-10 DIAGNOSIS — Z79.82 LONG TERM (CURRENT) USE OF ASPIRIN: ICD-10-CM

## 2019-10-10 DIAGNOSIS — E11.22 TYPE 2 DIABETES MELLITUS WITH DIABETIC CHRONIC KIDNEY DISEASE: ICD-10-CM

## 2019-10-10 DIAGNOSIS — E78.1 PURE HYPERGLYCERIDEMIA: ICD-10-CM

## 2019-10-10 DIAGNOSIS — N40.0 BENIGN PROSTATIC HYPERPLASIA WITHOUT LOWER URINARY TRACT SYMPTOMS: ICD-10-CM

## 2019-10-10 DIAGNOSIS — C85.90 NON-HODGKIN LYMPHOMA, UNSPECIFIED, UNSPECIFIED SITE: ICD-10-CM

## 2019-10-10 DIAGNOSIS — R07.9 CHEST PAIN, UNSPECIFIED: ICD-10-CM

## 2019-10-10 DIAGNOSIS — Z79.4 LONG TERM (CURRENT) USE OF INSULIN: ICD-10-CM

## 2019-10-10 DIAGNOSIS — N18.9 CHRONIC KIDNEY DISEASE, UNSPECIFIED: ICD-10-CM

## 2019-10-10 DIAGNOSIS — Z92.3 PERSONAL HISTORY OF IRRADIATION: ICD-10-CM

## 2019-10-10 DIAGNOSIS — K21.9 GASTRO-ESOPHAGEAL REFLUX DISEASE WITHOUT ESOPHAGITIS: ICD-10-CM

## 2019-10-10 DIAGNOSIS — R01.1 CARDIAC MURMUR, UNSPECIFIED: ICD-10-CM

## 2019-10-10 DIAGNOSIS — I44.0 ATRIOVENTRICULAR BLOCK, FIRST DEGREE: ICD-10-CM

## 2019-11-11 ENCOUNTER — APPOINTMENT (OUTPATIENT)
Dept: CARDIOLOGY | Facility: CLINIC | Age: 74
End: 2019-11-11
Payer: MEDICARE

## 2019-11-11 ENCOUNTER — NON-APPOINTMENT (OUTPATIENT)
Age: 74
End: 2019-11-11

## 2019-11-11 VITALS
WEIGHT: 210 LBS | SYSTOLIC BLOOD PRESSURE: 144 MMHG | OXYGEN SATURATION: 98 % | DIASTOLIC BLOOD PRESSURE: 68 MMHG | HEART RATE: 75 BPM | HEIGHT: 71 IN | BODY MASS INDEX: 29.4 KG/M2

## 2019-11-11 PROCEDURE — 99214 OFFICE O/P EST MOD 30 MIN: CPT

## 2019-11-11 PROCEDURE — 93000 ELECTROCARDIOGRAM COMPLETE: CPT

## 2019-11-11 RX ORDER — AMLODIPINE BESYLATE 5 MG/1
5 TABLET ORAL DAILY
Qty: 90 | Refills: 0 | Status: DISCONTINUED | COMMUNITY
Start: 2019-10-04 | End: 2019-11-11

## 2019-11-11 NOTE — REVIEW OF SYSTEMS
[Fever] : no fever [Chills] : no chills [Earache] : no earache [Blurry Vision] : no blurred vision [Cough] : no cough [Abdominal Pain] : no abdominal pain [Urinary Frequency] : no change in urinary frequency [Impotence] : no impotence [Joint Pain] : no joint pain [Skin: A Rash] : no rash: [Muscle Cramps] : no muscle cramps [Dizziness] : no dizziness [Confusion] : no confusion was observed

## 2019-11-11 NOTE — HISTORY OF PRESENT ILLNESS
[FreeTextEntry1] :  74 year old male with hx of DM, HLD , prior treated NHL with radiation to neck who came for follow up  Patient was admitted with left sided constant chest pain mild not related to exertion , patient did travel long distance , did have negative cardiac enzymes , DAMION szymanski , patient Normal nuclear myocardiac perfusion scan with equivocal EKG changes on stress , patient did have CT angio of chest showed   patient does have very mild discomfort only when he thinks about it  not related to exertion ,  patient subsequently had recommended test  normal nuclear study , he is feelign much better after starting him on blood pressure medications , still his home blood pressure readings are mild elevated , not very compliant to low salt diet \par \par \par \par His blood work in hospital   creatinine 1.41     LDL 15 HDL 25   CT angio no acute PE   negative venous doppler

## 2019-11-11 NOTE — PHYSICAL EXAM
[General Appearance - Well Developed] : well developed [Normal Conjunctiva] : the conjunctiva exhibited no abnormalities [Normal Oral Mucosa] : normal oral mucosa [] : no respiratory distress [Normal Jugular Venous A Waves Present] : normal jugular venous A waves present [Respiration, Rhythm And Depth] : normal respiratory rhythm and effort [Exaggerated Use Of Accessory Muscles For Inspiration] : no accessory muscle use [Auscultation Breath Sounds / Voice Sounds] : lungs were clear to auscultation bilaterally [Chest Palpation] : palpation of the chest revealed no abnormalities [Lungs Percussion] : the lungs were normal to percussion [Heart Rate And Rhythm] : heart rate and rhythm were normal [Edema] : no peripheral edema present [Heart Sounds] : normal S1 and S2 [Arterial Pulses Normal] : the arterial pulses were normal [Veins - Varicosity Changes] : no varicosital changes were noted in the lower extremities [Systolic grade ___/6] : A grade [unfilled]/6 systolic murmur was heard. [Bowel Sounds] : normal bowel sounds [Abdomen Soft] : soft [Abnormal Walk] : normal gait [Nail Clubbing] : no clubbing of the fingernails [Skin Color & Pigmentation] : normal skin color and pigmentation [Oriented To Time, Place, And Person] : oriented to person, place, and time

## 2019-11-14 ENCOUNTER — APPOINTMENT (OUTPATIENT)
Dept: ENDOCRINOLOGY | Facility: CLINIC | Age: 74
End: 2019-11-14
Payer: MEDICARE

## 2019-11-14 VITALS
SYSTOLIC BLOOD PRESSURE: 138 MMHG | WEIGHT: 210 LBS | HEART RATE: 62 BPM | HEIGHT: 71 IN | TEMPERATURE: 98 F | DIASTOLIC BLOOD PRESSURE: 82 MMHG | BODY MASS INDEX: 29.4 KG/M2 | OXYGEN SATURATION: 97 %

## 2019-11-14 PROCEDURE — 83036 HEMOGLOBIN GLYCOSYLATED A1C: CPT | Mod: QW

## 2019-11-14 PROCEDURE — 99214 OFFICE O/P EST MOD 30 MIN: CPT

## 2019-11-14 PROCEDURE — 82962 GLUCOSE BLOOD TEST: CPT

## 2019-11-14 PROCEDURE — 82044 UR ALBUMIN SEMIQUANTITATIVE: CPT | Mod: QW

## 2019-11-15 ENCOUNTER — APPOINTMENT (OUTPATIENT)
Dept: FAMILY MEDICINE | Facility: CLINIC | Age: 74
End: 2019-11-15
Payer: MEDICARE

## 2019-11-15 VITALS
SYSTOLIC BLOOD PRESSURE: 128 MMHG | HEIGHT: 71 IN | BODY MASS INDEX: 29.4 KG/M2 | WEIGHT: 210 LBS | DIASTOLIC BLOOD PRESSURE: 70 MMHG

## 2019-11-15 LAB
ALBUMIN: 10
CREATININE: 50
GLUCOSE BLDC GLUCOMTR-MCNC: 151
HBA1C MFR BLD HPLC: 7
MICROALBUMIN/CREAT UR TEST STR-RTO: 30

## 2019-11-15 PROCEDURE — 99214 OFFICE O/P EST MOD 30 MIN: CPT

## 2019-11-15 NOTE — PHYSICAL EXAM
[Normal] : no respiratory distress, lungs were clear to auscultation bilaterally and no accessory muscle use [de-identified] : minimimal erythema to left anterior thigh. Slighly warm to touch. No lymphangitis.

## 2019-11-15 NOTE — REVIEW OF SYSTEMS
[Negative] : Neurological [Itching] : no itching [Skin Rash] : no skin rash [Mole Changes] : no mole changes

## 2019-11-15 NOTE — HISTORY OF PRESENT ILLNESS
[FreeTextEntry8] : Possible infection R leg. Injects insulin. Early last night became tender and this morning felt a little warm Has been applying ice packs. No fever chills or sweats.

## 2019-12-03 ENCOUNTER — MEDICATION RENEWAL (OUTPATIENT)
Age: 74
End: 2019-12-03

## 2019-12-06 ENCOUNTER — RX RENEWAL (OUTPATIENT)
Age: 74
End: 2019-12-06

## 2019-12-06 NOTE — HISTORY OF PRESENT ILLNESS
[FreeTextEntry1] : Mr. Dey  returns today in follow up with regard to a history of type 2 diabetes mellitus.  There is no known history of retinopathy or nephropathy. He too denies any history of neuropathy. Had  c/p-to er [facility]--w/u neg -follow with cardiol Dr. Palla-put on losartan and now amlodopine Current dm medication include Janumet xr    one tablet daily, Jardiance 10 mg po daily,  levemir  60 units in am and 60 units at night , novolog 25-30 units per  day based on sliding scale pre each meals  HGM of late has shown values to be running in am 130's to 150's  and pre lunch 81 to 205 most in low to mid 100's and pre dinner mdi to high 100's and hs high 100's down to 104. The variety he attributes to travel and situational factors. There has been no significant hypoglycemia. He denies any chest pain, sob, neurologic or ophthalmologic complaints. He too denies any new podiatric concerns. He is up to date with his ophthalmologic visit. Additional diagnose include: hyperlipidemia Does have hx of primary hpt-calcium stable in mid 10's.\par

## 2019-12-09 ENCOUNTER — APPOINTMENT (OUTPATIENT)
Dept: CARDIOLOGY | Facility: CLINIC | Age: 74
End: 2019-12-09
Payer: MEDICARE

## 2019-12-09 ENCOUNTER — APPOINTMENT (OUTPATIENT)
Dept: UROLOGY | Facility: CLINIC | Age: 74
End: 2019-12-09
Payer: MEDICARE

## 2019-12-09 ENCOUNTER — NON-APPOINTMENT (OUTPATIENT)
Age: 74
End: 2019-12-09

## 2019-12-09 VITALS
BODY MASS INDEX: 39.46 KG/M2 | HEIGHT: 61 IN | DIASTOLIC BLOOD PRESSURE: 56 MMHG | OXYGEN SATURATION: 97 % | WEIGHT: 209 LBS | HEART RATE: 78 BPM | SYSTOLIC BLOOD PRESSURE: 108 MMHG

## 2019-12-09 VITALS
DIASTOLIC BLOOD PRESSURE: 74 MMHG | OXYGEN SATURATION: 97 % | HEIGHT: 71 IN | WEIGHT: 209 LBS | SYSTOLIC BLOOD PRESSURE: 139 MMHG | HEART RATE: 62 BPM | BODY MASS INDEX: 29.26 KG/M2

## 2019-12-09 VITALS — DIASTOLIC BLOOD PRESSURE: 50 MMHG | SYSTOLIC BLOOD PRESSURE: 128 MMHG

## 2019-12-09 LAB
ALBUMIN SERPL ELPH-MCNC: 4.2 G/DL
ALP BLD-CCNC: 64 U/L
ALT SERPL-CCNC: 25 U/L
ANION GAP SERPL CALC-SCNC: 15 MMOL/L
AST SERPL-CCNC: 27 U/L
BILIRUB SERPL-MCNC: 0.3 MG/DL
BUN SERPL-MCNC: 28 MG/DL
CALCIUM SERPL-MCNC: 10.3 MG/DL
CHLORIDE SERPL-SCNC: 103 MMOL/L
CHOLEST SERPL-MCNC: 131 MG/DL
CHOLEST/HDLC SERPL: 4.2 RATIO
CO2 SERPL-SCNC: 24 MMOL/L
CREAT SERPL-MCNC: 1.63 MG/DL
ESTIMATED AVERAGE GLUCOSE: 154 MG/DL
GLUCOSE SERPL-MCNC: 224 MG/DL
HBA1C MFR BLD HPLC: 7 %
HDLC SERPL-MCNC: 31 MG/DL
LDLC SERPL CALC-MCNC: 52 MG/DL
POTASSIUM SERPL-SCNC: 4.4 MMOL/L
PROT SERPL-MCNC: 6.7 G/DL
SODIUM SERPL-SCNC: 142 MMOL/L
TRIGL SERPL-MCNC: 242 MG/DL

## 2019-12-09 PROCEDURE — 93000 ELECTROCARDIOGRAM COMPLETE: CPT

## 2019-12-09 PROCEDURE — 99213 OFFICE O/P EST LOW 20 MIN: CPT

## 2019-12-09 PROCEDURE — 99214 OFFICE O/P EST MOD 30 MIN: CPT

## 2019-12-09 RX ORDER — AMOXICILLIN AND CLAVULANATE POTASSIUM 875; 125 MG/1; MG/1
875-125 TABLET, COATED ORAL
Qty: 14 | Refills: 0 | Status: DISCONTINUED | COMMUNITY
Start: 2019-11-15 | End: 2019-12-09

## 2019-12-09 NOTE — REASON FOR VISIT
[Follow-Up - Clinic] : a clinic follow-up of [Abnormal ECG] : an abnormal ECG [Hypertension] : hypertension [Hyperlipidemia] : hyperlipidemia [Medication Management] : Medication management

## 2019-12-09 NOTE — ASSESSMENT
[FreeTextEntry1] : 74 year old male with above hx\par \par atypical chest pain , improved much with prilosec   had negative nuclear perfusion scan , no PE , negative troponin ,  will continue  omeprazole 20 mg po daily \par \par controlled hypertension with hypertensive heart disease with abnormal EKG , continue  norvasc 10 mg po daily  \par recheck  once he increases water intake , home BP reading , blood work BMP \par \par CKD  Mild , stable   . \par \par DM    controlled  HB A1c 7.0  strict DM

## 2019-12-09 NOTE — REVIEW OF SYSTEMS
[Blurry Vision] : no blurred vision [Chills] : no chills [Fever] : no fever [Cough] : no cough [Earache] : no earache [Impotence] : no impotence [Urinary Frequency] : no change in urinary frequency [Abdominal Pain] : no abdominal pain [Joint Pain] : no joint pain [Muscle Cramps] : no muscle cramps [Skin: A Rash] : no rash: [Dizziness] : no dizziness [Confusion] : no confusion was observed

## 2019-12-09 NOTE — PHYSICAL EXAM
[General Appearance - Well Developed] : well developed [Normal Conjunctiva] : the conjunctiva exhibited no abnormalities [Normal Oral Mucosa] : normal oral mucosa [Normal Jugular Venous A Waves Present] : normal jugular venous A waves present [] : no respiratory distress [Respiration, Rhythm And Depth] : normal respiratory rhythm and effort [Exaggerated Use Of Accessory Muscles For Inspiration] : no accessory muscle use [Auscultation Breath Sounds / Voice Sounds] : lungs were clear to auscultation bilaterally [Heart Rate And Rhythm] : heart rate and rhythm were normal [Chest Palpation] : palpation of the chest revealed no abnormalities [Lungs Percussion] : the lungs were normal to percussion [Heart Sounds] : normal S1 and S2 [Edema] : no peripheral edema present [Veins - Varicosity Changes] : no varicosital changes were noted in the lower extremities [Arterial Pulses Normal] : the arterial pulses were normal [Systolic grade ___/6] : A grade [unfilled]/6 systolic murmur was heard. [Bowel Sounds] : normal bowel sounds [Abnormal Walk] : normal gait [Abdomen Soft] : soft [Nail Clubbing] : no clubbing of the fingernails [Skin Color & Pigmentation] : normal skin color and pigmentation [Oriented To Time, Place, And Person] : oriented to person, place, and time

## 2019-12-09 NOTE — HISTORY OF PRESENT ILLNESS
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 patient does not  drink water \par \par \par \par His blood work in hospital   creatinine 1.41     LDL 15 HDL 25   CT angio no acute PE   negative venous doppler

## 2019-12-09 NOTE — PHYSICAL EXAM
[General Appearance - Well Developed] : well developed [Normal Appearance] : normal appearance [Well Groomed] : well groomed [General Appearance - Well Nourished] : well nourished [General Appearance - In No Acute Distress] : no acute distress [Abdomen Soft] : soft [Abdomen Tenderness] : non-tender [Costovertebral Angle Tenderness] : no ~M costovertebral angle tenderness [Scrotum] : the scrotum was normal [Urinary Bladder Findings] : the bladder was normal on palpation [Urethral Meatus] : meatus normal [Testes Mass (___cm)] : there were no testicular masses [No Prostate Nodules] : no prostate nodules [Edema] : no peripheral edema [Respiration, Rhythm And Depth] : normal respiratory rhythm and effort [] : no respiratory distress [Exaggerated Use Of Accessory Muscles For Inspiration] : no accessory muscle use [Mood] : the mood was normal [Affect] : the affect was normal [Oriented To Time, Place, And Person] : oriented to person, place, and time [Normal Station and Gait] : the gait and station were normal for the patient's age [Not Anxious] : not anxious [No Focal Deficits] : no focal deficits [No Palpable Adenopathy] : no palpable adenopathy

## 2019-12-11 ENCOUNTER — RESULT REVIEW (OUTPATIENT)
Age: 74
End: 2019-12-11

## 2019-12-11 LAB
APPEARANCE: CLEAR
BACTERIA UR CULT: NORMAL
BACTERIA: NEGATIVE
BILIRUBIN URINE: NEGATIVE
BLOOD URINE: NEGATIVE
COLOR: YELLOW
GLUCOSE QUALITATIVE U: ABNORMAL
HYALINE CASTS: 0 /LPF
KETONES URINE: NEGATIVE
LEUKOCYTE ESTERASE URINE: NEGATIVE
MICROSCOPIC-UA: NORMAL
NITRITE URINE: NEGATIVE
PH URINE: 5.5
PROTEIN URINE: NEGATIVE
PSA SERPL-MCNC: 1.42 NG/ML
RED BLOOD CELLS URINE: 2 /HPF
SPECIFIC GRAVITY URINE: 1.03
SQUAMOUS EPITHELIAL CELLS: 0 /HPF
URINE CYTOLOGY: NORMAL
UROBILINOGEN URINE: NORMAL
WHITE BLOOD CELLS URINE: 1 /HPF

## 2020-01-06 ENCOUNTER — RX RENEWAL (OUTPATIENT)
Age: 75
End: 2020-01-06

## 2020-01-09 LAB
ANION GAP SERPL CALC-SCNC: 14 MMOL/L
BUN SERPL-MCNC: 23 MG/DL
CALCIUM SERPL-MCNC: 10.3 MG/DL
CHLORIDE SERPL-SCNC: 104 MMOL/L
CO2 SERPL-SCNC: 23 MMOL/L
CREAT SERPL-MCNC: 1.55 MG/DL
GLUCOSE SERPL-MCNC: 181 MG/DL
POTASSIUM SERPL-SCNC: 4.5 MMOL/L
SODIUM SERPL-SCNC: 141 MMOL/L

## 2020-01-13 ENCOUNTER — NON-APPOINTMENT (OUTPATIENT)
Age: 75
End: 2020-01-13

## 2020-01-13 ENCOUNTER — APPOINTMENT (OUTPATIENT)
Dept: CARDIOLOGY | Facility: CLINIC | Age: 75
End: 2020-01-13
Payer: MEDICARE

## 2020-01-13 VITALS
WEIGHT: 206 LBS | SYSTOLIC BLOOD PRESSURE: 133 MMHG | HEIGHT: 71 IN | BODY MASS INDEX: 28.84 KG/M2 | HEART RATE: 66 BPM | DIASTOLIC BLOOD PRESSURE: 68 MMHG | OXYGEN SATURATION: 98 %

## 2020-01-13 PROCEDURE — 99214 OFFICE O/P EST MOD 30 MIN: CPT

## 2020-01-13 PROCEDURE — 93000 ELECTROCARDIOGRAM COMPLETE: CPT

## 2020-01-13 NOTE — REVIEW OF SYSTEMS
[Fever] : no fever [Chills] : no chills [Blurry Vision] : no blurred vision [Earache] : no earache [Cough] : no cough [Abdominal Pain] : no abdominal pain [Urinary Frequency] : no change in urinary frequency [Impotence] : no impotence [Joint Pain] : no joint pain [Muscle Cramps] : no muscle cramps [Skin: A Rash] : no rash: [Confusion] : no confusion was observed [Dizziness] : no dizziness

## 2020-01-13 NOTE — ASSESSMENT
[FreeTextEntry1] : 74 year old male with above hx\par \par atypical chest pain , improved much with prilosec  ( resolved )   had negative nuclear perfusion scan , no PE , negative troponin ,  will continue  omeprazole 20 mg po daily \par \par controlled hypertension with hypertensive heart disease with abnormal EKG , continue  norvasc 10 mg po daily  \par  home BP reading , \par \par CKD  Mild , stable   . refer nephrologist \par \par DM    controlled  HB A1c 7.0  strict DM \par \par follow up after 3 months

## 2020-01-13 NOTE — HISTORY OF PRESENT ILLNESS
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 patient does not  drink water   his follow up creatinine 1.55  glucose 181 \par \par \par \par His blood work in hospital   creatinine 1.41   \par \par his blood work   LDL 52  HDL 31     CT angio no acute PE   negative venous doppler

## 2020-01-13 NOTE — PHYSICAL EXAM
[General Appearance - Well Developed] : well developed [Normal Conjunctiva] : the conjunctiva exhibited no abnormalities [Normal Oral Mucosa] : normal oral mucosa [] : no respiratory distress [Normal Jugular Venous A Waves Present] : normal jugular venous A waves present [Respiration, Rhythm And Depth] : normal respiratory rhythm and effort [Exaggerated Use Of Accessory Muscles For Inspiration] : no accessory muscle use [Chest Palpation] : palpation of the chest revealed no abnormalities [Auscultation Breath Sounds / Voice Sounds] : lungs were clear to auscultation bilaterally [Heart Rate And Rhythm] : heart rate and rhythm were normal [Lungs Percussion] : the lungs were normal to percussion [Edema] : no peripheral edema present [Arterial Pulses Normal] : the arterial pulses were normal [Heart Sounds] : normal S1 and S2 [Veins - Varicosity Changes] : no varicosital changes were noted in the lower extremities [Systolic grade ___/6] : A grade [unfilled]/6 systolic murmur was heard. [Abdomen Soft] : soft [Bowel Sounds] : normal bowel sounds [Abnormal Walk] : normal gait [Nail Clubbing] : no clubbing of the fingernails [Oriented To Time, Place, And Person] : oriented to person, place, and time [Skin Color & Pigmentation] : normal skin color and pigmentation

## 2020-01-14 ENCOUNTER — TRANSCRIPTION ENCOUNTER (OUTPATIENT)
Age: 75
End: 2020-01-14

## 2020-01-21 NOTE — HISTORY OF PRESENT ILLNESS
[FreeTextEntry1] : This patient is here for a checkup. He feels that he is urinating without difficulty and needs to get his labs done as well to
Statement Selected

## 2020-04-13 ENCOUNTER — APPOINTMENT (OUTPATIENT)
Dept: CARDIOLOGY | Facility: CLINIC | Age: 75
End: 2020-04-13

## 2020-04-14 ENCOUNTER — APPOINTMENT (OUTPATIENT)
Dept: ENDOCRINOLOGY | Facility: CLINIC | Age: 75
End: 2020-04-14

## 2020-05-11 ENCOUNTER — APPOINTMENT (OUTPATIENT)
Dept: ENDOCRINOLOGY | Facility: CLINIC | Age: 75
End: 2020-05-11
Payer: MEDICARE

## 2020-05-11 PROCEDURE — 99214 OFFICE O/P EST MOD 30 MIN: CPT | Mod: 95

## 2020-05-20 NOTE — HISTORY OF PRESENT ILLNESS
[Patient] : the patient [Medical Office: ([facility])___] : at the medical office located in  [Self] : self [Other Location: e.g. School (Enter Location, City,State)___] : at [unfilled], at the time of the visit. [FreeTextEntry1] : Mr. Dey  returns today in follow up with regard to a history of type 2 diabetes mellitus. He presented via telehealth. We tried, but were unable to successfully connect with [employer].\par  There is no known history of retinopathy or nephropathy. He too denies any history of neuropathy. on losartan and now lampooned Current dm medications include Janumet xr    one tablet daily, Jardiance 10 mg po daily,  levemir  60 units in am and 60 units at night , novolog 25-30 units per  day based on sliding scale pre each meals  HGM of late has shown values to be running  under 120 fasting, 125-140 pre dinner dnd about 200 hs which is about 4 hrs post dinner.  The variety he attributes to travel and situational factors. There has been no significant hypoglycemia. He denies any chest pain, sob, neurologic or ophthalmologic complaints. He too denies any new podiatric concerns. He is up to date with his ophthalmologic visit. Additional diagnose include: hyperlipidemia Does have mild hypercalcemia which may be Non PTH mediated.\par

## 2020-05-20 NOTE — PHYSICAL EXAM
[Alert] : alert [Well Nourished] : well nourished [No Acute Distress] : no acute distress [Well Developed] : well developed [Normal Sclera/Conjunctiva] : normal sclera/conjunctiva [EOMI] : extra ocular movement intact [No Proptosis] : no proptosis [Thyroid Not Enlarged] : the thyroid was not enlarged [Normal Oropharynx] : the oropharynx was normal [No Thyroid Nodules] : no palpable thyroid nodules [No Respiratory Distress] : no respiratory distress [No Accessory Muscle Use] : no accessory muscle use [Clear to Auscultation] : lungs were clear to auscultation bilaterally [Normal S1, S2] : normal S1 and S2 [Normal Rate] : heart rate was normal [Regular Rhythm] : with a regular rhythm [No Edema] : no peripheral edema [Normal Bowel Sounds] : normal bowel sounds [Not Tender] : non-tender [Pedal Pulses Normal] : the pedal pulses are present [Soft] : abdomen soft [Not Distended] : not distended [No Spinal Tenderness] : no spinal tenderness [Normal Anterior Cervical Nodes] : no anterior cervical lymphadenopathy [Normal Posterior Cervical Nodes] : no posterior cervical lymphadenopathy [Spine Straight] : spine straight [No Stigmata of Cushings Syndrome] : no stigmata of Cushings Syndrome [Normal Gait] : normal gait [No Rash] : no rash [Normal Strength/Tone] : muscle strength and tone were normal [No Tremors] : no tremors [Oriented x3] : oriented to person, place, and time [Normal Reflexes] : deep tendon reflexes were 2+ and symmetric [Acanthosis Nigricans] : no acanthosis nigricans

## 2020-06-19 ENCOUNTER — APPOINTMENT (OUTPATIENT)
Dept: FAMILY MEDICINE | Facility: CLINIC | Age: 75
End: 2020-06-19
Payer: MEDICARE

## 2020-06-19 VITALS
WEIGHT: 210 LBS | HEART RATE: 62 BPM | BODY MASS INDEX: 29.4 KG/M2 | RESPIRATION RATE: 16 BRPM | OXYGEN SATURATION: 97 % | DIASTOLIC BLOOD PRESSURE: 58 MMHG | HEIGHT: 71 IN | SYSTOLIC BLOOD PRESSURE: 132 MMHG

## 2020-06-19 PROCEDURE — 99213 OFFICE O/P EST LOW 20 MIN: CPT

## 2020-06-19 RX ORDER — SULFAMETHOXAZOLE AND TRIMETHOPRIM 800; 160 MG/1; MG/1
800-160 TABLET ORAL
Qty: 14 | Refills: 0 | Status: COMPLETED | COMMUNITY
Start: 2020-06-19 | End: 2020-06-26

## 2020-06-19 NOTE — REVIEW OF SYSTEMS
[Itching] : itching [Mole Changes] : no mole changes [Skin Rash] : skin rash [Negative] : Heme/Lymph

## 2020-06-19 NOTE — HISTORY OF PRESENT ILLNESS
[FreeTextEntry8] : Cut right lower leg  6 days ago. Bled a little. Then felt fine. Then 2 days later started to get a little tender. Then yesterday started to notice some redness. Has been using Neosporin cream. Not taking any other medications for it.

## 2020-06-19 NOTE — PHYSICAL EXAM
[Normal] : normal rate, regular rhythm, normal S1 and S2 and no murmur heard [de-identified] : mild erythema surronding scab to right lower leg. No discharge. Minimmaly warm to touch.. No lymphangitis.

## 2020-06-19 NOTE — PLAN
[FreeTextEntry1] : warm compresses\par elevation\par F/u if worsens or not improved with antibiotics\par Advised at length

## 2020-07-02 RX ORDER — SITAGLIPTIN AND METFORMIN HYDROCHLORIDE 50; 1000 MG/1; MG/1
50-1000 TABLET, FILM COATED, EXTENDED RELEASE ORAL
Qty: 180 | Refills: 1 | Status: DISCONTINUED | COMMUNITY
Start: 2020-07-02 | End: 2020-07-02

## 2020-07-16 ENCOUNTER — APPOINTMENT (OUTPATIENT)
Dept: ULTRASOUND IMAGING | Facility: CLINIC | Age: 75
End: 2020-07-16
Payer: MEDICARE

## 2020-07-16 ENCOUNTER — OUTPATIENT (OUTPATIENT)
Dept: OUTPATIENT SERVICES | Facility: HOSPITAL | Age: 75
LOS: 1 days | End: 2020-07-16
Payer: MEDICARE

## 2020-07-16 DIAGNOSIS — Z00.8 ENCOUNTER FOR OTHER GENERAL EXAMINATION: ICD-10-CM

## 2020-07-16 DIAGNOSIS — Z98.890 OTHER SPECIFIED POSTPROCEDURAL STATES: Chronic | ICD-10-CM

## 2020-07-16 DIAGNOSIS — Z90.89 ACQUIRED ABSENCE OF OTHER ORGANS: Chronic | ICD-10-CM

## 2020-07-16 PROCEDURE — 76770 US EXAM ABDO BACK WALL COMP: CPT | Mod: 26

## 2020-07-16 PROCEDURE — 76770 US EXAM ABDO BACK WALL COMP: CPT

## 2020-07-28 LAB
ACE BLD-CCNC: 26 U/L
ANION GAP SERPL CALC-SCNC: 13 MMOL/L
BUN SERPL-MCNC: 34 MG/DL
CA-I SERPL-SCNC: 1.32 MMOL/L
CALCIUM SERPL-MCNC: 9.9 MG/DL
CALCIUM SERPL-MCNC: 9.9 MG/DL
CHLORIDE SERPL-SCNC: 104 MMOL/L
CO2 SERPL-SCNC: 22 MMOL/L
CREAT SERPL-MCNC: 2.25 MG/DL
GLUCOSE SERPL-MCNC: 120 MG/DL
PARATHYROID HORMONE INTACT: 8 PG/ML
POTASSIUM SERPL-SCNC: 4.8 MMOL/L
PTH RELATED PROT SERPL-MCNC: <2 PMOL/L
SODIUM SERPL-SCNC: 140 MMOL/L

## 2020-08-17 ENCOUNTER — NON-APPOINTMENT (OUTPATIENT)
Age: 75
End: 2020-08-17

## 2020-08-17 ENCOUNTER — APPOINTMENT (OUTPATIENT)
Dept: CARDIOLOGY | Facility: CLINIC | Age: 75
End: 2020-08-17
Payer: MEDICARE

## 2020-08-17 VITALS
HEIGHT: 71 IN | TEMPERATURE: 98 F | HEART RATE: 52 BPM | OXYGEN SATURATION: 97 % | WEIGHT: 208 LBS | BODY MASS INDEX: 29.12 KG/M2 | RESPIRATION RATE: 16 BRPM | SYSTOLIC BLOOD PRESSURE: 135 MMHG | DIASTOLIC BLOOD PRESSURE: 66 MMHG

## 2020-08-17 VITALS — SYSTOLIC BLOOD PRESSURE: 130 MMHG | DIASTOLIC BLOOD PRESSURE: 60 MMHG

## 2020-08-17 PROCEDURE — 99214 OFFICE O/P EST MOD 30 MIN: CPT

## 2020-08-17 PROCEDURE — 93000 ELECTROCARDIOGRAM COMPLETE: CPT

## 2020-08-17 NOTE — REVIEW OF SYSTEMS
[Fever] : no fever [Chills] : no chills [Blurry Vision] : no blurred vision [Earache] : no earache [Mouth Sores] : no mouth sores [Cough] : no cough [Urinary Frequency] : no change in urinary frequency [Abdominal Pain] : no abdominal pain [Impotence] : no impotence [Joint Pain] : no joint pain [Skin: A Rash] : no rash: [Muscle Cramps] : no muscle cramps [Confusion] : no confusion was observed [Dizziness] : no dizziness

## 2020-08-17 NOTE — PHYSICAL EXAM
[General Appearance - Well Developed] : well developed [Normal Oral Mucosa] : normal oral mucosa [Normal Conjunctiva] : the conjunctiva exhibited no abnormalities [Normal Jugular Venous A Waves Present] : normal jugular venous A waves present [] : no respiratory distress [Respiration, Rhythm And Depth] : normal respiratory rhythm and effort [Auscultation Breath Sounds / Voice Sounds] : lungs were clear to auscultation bilaterally [Exaggerated Use Of Accessory Muscles For Inspiration] : no accessory muscle use [Chest Palpation] : palpation of the chest revealed no abnormalities [Lungs Percussion] : the lungs were normal to percussion [Heart Sounds] : normal S1 and S2 [Heart Rate And Rhythm] : heart rate and rhythm were normal [Edema] : no peripheral edema present [Arterial Pulses Normal] : the arterial pulses were normal [Systolic grade ___/6] : A grade [unfilled]/6 systolic murmur was heard. [Veins - Varicosity Changes] : no varicosital changes were noted in the lower extremities [Bowel Sounds] : normal bowel sounds [Abdomen Soft] : soft [FreeTextEntry1] : obese [Nail Clubbing] : no clubbing of the fingernails [Skin Color & Pigmentation] : normal skin color and pigmentation [Abnormal Walk] : normal gait [Oriented To Time, Place, And Person] : oriented to person, place, and time

## 2020-08-17 NOTE — ASSESSMENT
[FreeTextEntry1] : 75year old male with above hx\par \par atypical chest pain , improved much with prilosec  ( resolved )   had negative nuclear perfusion scan , no PE , negative troponin ,  will continue  omeprazole 20 mg po daily \par \par controlled hypertension with hypertensive heart disease with abnormal EKG , continue  norvasc 10 mg po daily  \par  home BP reading , \par \par CKD  Mild , stable   . refer nephrologist \par \par DM    controlled  HB A1c 6.4  strict DM \par \par follow up after 3 months

## 2020-08-17 NOTE — HISTORY OF PRESENT ILLNESS
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 his renal  function  was increased creatinine ,\par patient is taking losartan /hctz\par \par  rarely had brief mild chest discomfort while he was flying , denies any chest pain  or dizziness ,  his blood work showed mild worsening of creatinine , elevated TG  low HDL despite on medication , but better ,  his Hb A1c 7.0 . patient does not  drink water   his follow up creatinine 1.55  glucose 181 \par \par \par \par His blood work in hospital   creatinine 1.41    now increased 2.25\par \par his blood work   LDL 52  HDL 31     CT angio no acute PE   negative venous doppler

## 2020-08-27 LAB
ANION GAP SERPL CALC-SCNC: 17 MMOL/L
BUN SERPL-MCNC: 31 MG/DL
CALCIUM SERPL-MCNC: 9.9 MG/DL
CHLORIDE SERPL-SCNC: 102 MMOL/L
CO2 SERPL-SCNC: 23 MMOL/L
CREAT SERPL-MCNC: 1.64 MG/DL
ESTIMATED AVERAGE GLUCOSE: 128 MG/DL
GLUCOSE SERPL-MCNC: 133 MG/DL
HBA1C MFR BLD HPLC: 6.1 %
POTASSIUM SERPL-SCNC: 4.6 MMOL/L
SODIUM SERPL-SCNC: 142 MMOL/L

## 2020-08-31 ENCOUNTER — APPOINTMENT (OUTPATIENT)
Dept: ENDOCRINOLOGY | Facility: CLINIC | Age: 75
End: 2020-08-31
Payer: MEDICARE

## 2020-08-31 VITALS
WEIGHT: 200 LBS | SYSTOLIC BLOOD PRESSURE: 125 MMHG | HEIGHT: 71 IN | BODY MASS INDEX: 28 KG/M2 | DIASTOLIC BLOOD PRESSURE: 80 MMHG | HEART RATE: 48 BPM | TEMPERATURE: 97.9 F | OXYGEN SATURATION: 97 %

## 2020-08-31 PROCEDURE — 99214 OFFICE O/P EST MOD 30 MIN: CPT

## 2020-08-31 PROCEDURE — 82962 GLUCOSE BLOOD TEST: CPT

## 2020-09-10 LAB — GLUCOSE BLDC GLUCOMTR-MCNC: 110

## 2020-09-23 ENCOUNTER — APPOINTMENT (OUTPATIENT)
Dept: FAMILY MEDICINE | Facility: CLINIC | Age: 75
End: 2020-09-23
Payer: MEDICARE

## 2020-09-23 VITALS
RESPIRATION RATE: 16 BRPM | BODY MASS INDEX: 28 KG/M2 | WEIGHT: 200 LBS | HEART RATE: 45 BPM | OXYGEN SATURATION: 98 % | SYSTOLIC BLOOD PRESSURE: 120 MMHG | TEMPERATURE: 98.1 F | DIASTOLIC BLOOD PRESSURE: 58 MMHG | HEIGHT: 71 IN

## 2020-09-23 PROCEDURE — G0008: CPT

## 2020-09-23 PROCEDURE — 90662 IIV NO PRSV INCREASED AG IM: CPT

## 2020-10-14 ENCOUNTER — RX RENEWAL (OUTPATIENT)
Age: 75
End: 2020-10-14

## 2020-10-15 ENCOUNTER — LABORATORY RESULT (OUTPATIENT)
Age: 75
End: 2020-10-15

## 2020-10-16 NOTE — HISTORY OF PRESENT ILLNESS
[FreeTextEntry1] : Mr. Dey is a 75 year old male who  returns today in follow up with regard to a history of type 2 diabetes mellitus. \par  There is no known history of retinopathy or nephropathy. He too denies any history of neuropathy. on losartan and amlodopine 10mg Current dm medications include Januvia  50 mg per day one tablet daily, Jardiance 10 mg po daily,  levemir-he has cut down to 30 units am and hs with novolog 0-14 units  as opposed to 50 units  pre meals priors He has followed with dietitain and [employer] and has reduced carbs and bg's with much less iinsulin\par Bg's 70 's to 150 .  He has lost 6 lbs in past few weeks.    Hd one low bg at 4 am  woke bg in 60's tappropiately He denies any chest pain, sob, neurologic or ophthalmologic complaints. He too denies any new podiatric concerns. He is up to date with his ophthalmologic visit. Additional diagnose include: hyperlipidemia Does have mild hypercalcemia which may be Non PTH mediated.\par Labs one wek ago with Dr, Palla-A1c 6.2%.\par

## 2020-10-19 ENCOUNTER — APPOINTMENT (OUTPATIENT)
Dept: ENDOCRINOLOGY | Facility: CLINIC | Age: 75
End: 2020-10-19
Payer: MEDICARE

## 2020-10-19 VITALS
DIASTOLIC BLOOD PRESSURE: 70 MMHG | BODY MASS INDEX: 25.9 KG/M2 | RESPIRATION RATE: 16 BRPM | HEART RATE: 49 BPM | SYSTOLIC BLOOD PRESSURE: 126 MMHG | HEIGHT: 71 IN | OXYGEN SATURATION: 99 % | TEMPERATURE: 98.6 F | WEIGHT: 185 LBS

## 2020-10-19 LAB — GLUCOSE BLDC GLUCOMTR-MCNC: 175

## 2020-10-19 PROCEDURE — 82962 GLUCOSE BLOOD TEST: CPT

## 2020-10-19 PROCEDURE — 99214 OFFICE O/P EST MOD 30 MIN: CPT

## 2020-10-29 NOTE — PHYSICAL EXAM
[Alert] : alert [Well Nourished] : well nourished [Well Developed] : well developed [No Acute Distress] : no acute distress [Normal Sclera/Conjunctiva] : normal sclera/conjunctiva [No Proptosis] : no proptosis [EOMI] : extra ocular movement intact [Normal Oropharynx] : the oropharynx was normal [Thyroid Not Enlarged] : the thyroid was not enlarged [No Respiratory Distress] : no respiratory distress [No Thyroid Nodules] : no palpable thyroid nodules [No Accessory Muscle Use] : no accessory muscle use [Clear to Auscultation] : lungs were clear to auscultation bilaterally [Normal S1, S2] : normal S1 and S2 [Normal Rate] : heart rate was normal [Regular Rhythm] : with a regular rhythm [No Edema] : no peripheral edema [Pedal Pulses Normal] : the pedal pulses are present [Normal Bowel Sounds] : normal bowel sounds [Not Tender] : non-tender [Not Distended] : not distended [Soft] : abdomen soft [Normal Anterior Cervical Nodes] : no anterior cervical lymphadenopathy [No Spinal Tenderness] : no spinal tenderness [Normal Posterior Cervical Nodes] : no posterior cervical lymphadenopathy [No Stigmata of Cushings Syndrome] : no stigmata of Cushings Syndrome [Spine Straight] : spine straight [Normal Gait] : normal gait [Normal Strength/Tone] : muscle strength and tone were normal [No Rash] : no rash [Normal Reflexes] : deep tendon reflexes were 2+ and symmetric [No Tremors] : no tremors [Oriented x3] : oriented to person, place, and time [Acanthosis Nigricans] : no acanthosis nigricans

## 2020-10-29 NOTE — HISTORY OF PRESENT ILLNESS
[FreeTextEntry1] : Mr. Dey is a 75 year old male who  returns today in follow up with regard to a history of type 2 diabetes mellitus. \par  There is no known history of retinopathy or nephropathy. He too denies any history of neuropathy. on losartan and amlodipine 10mg Current dm medications include Januvia  50 mg per day one tablet daily, Jardiance 10 mg po daily,  levemir-he has cut down to 15am and 15-16 hs units am and not using much novolog at all. Using [employer]/Has dropped weight from 202-177 with diet changes. Working with spenser Nieves in [facility]\par HGM has shown   values in 130's to 150 range        . He denies any chest pain, sob, neurologic or ophthalmologic complaints. He too denies any new podiatric concerns. He is up to date with his ophthalmologic visit. Additional diagnose include: hyperlipidemia Does have mild hypercalcemia which may be Non PTH mediated. Recent labs with calcium normal and pth low end at 10.\par Does follow with nephrologist Dr. Snowden-latest Creatinine at 1.39\par Vitamin d cut down per Dr. Snowden from 5,00 to 3,000 iu and fish oil now 4,000 mg daily\par

## 2020-11-13 ENCOUNTER — NON-APPOINTMENT (OUTPATIENT)
Age: 75
End: 2020-11-13

## 2020-11-16 ENCOUNTER — NON-APPOINTMENT (OUTPATIENT)
Age: 75
End: 2020-11-16

## 2020-11-16 ENCOUNTER — APPOINTMENT (OUTPATIENT)
Dept: CARDIOLOGY | Facility: CLINIC | Age: 75
End: 2020-11-16
Payer: MEDICARE

## 2020-11-16 VITALS
TEMPERATURE: 97.3 F | HEIGHT: 71 IN | BODY MASS INDEX: 24.92 KG/M2 | SYSTOLIC BLOOD PRESSURE: 97 MMHG | WEIGHT: 178 LBS | OXYGEN SATURATION: 100 % | DIASTOLIC BLOOD PRESSURE: 59 MMHG | HEART RATE: 51 BPM

## 2020-11-16 VITALS — SYSTOLIC BLOOD PRESSURE: 120 MMHG | DIASTOLIC BLOOD PRESSURE: 60 MMHG

## 2020-11-16 PROCEDURE — 93000 ELECTROCARDIOGRAM COMPLETE: CPT

## 2020-11-16 PROCEDURE — 99214 OFFICE O/P EST MOD 30 MIN: CPT

## 2020-11-16 RX ORDER — CHOLECALCIFEROL (VITAMIN D3) 1250 MCG
1.25 MG CAPSULE ORAL DAILY
Refills: 0 | Status: DISCONTINUED | COMMUNITY
Start: 2019-03-19 | End: 2020-11-16

## 2020-11-16 NOTE — REVIEW OF SYSTEMS
[Fever] : no fever [Chills] : no chills [Blurry Vision] : no blurred vision [Earache] : no earache [Mouth Sores] : no mouth sores [Cough] : no cough [Abdominal Pain] : no abdominal pain [Urinary Frequency] : no change in urinary frequency [Impotence] : no impotence [Joint Pain] : no joint pain [Muscle Cramps] : no muscle cramps [Skin: A Rash] : no rash: [Dizziness] : no dizziness [Confusion] : no confusion was observed

## 2020-11-16 NOTE — HISTORY OF PRESENT ILLNESS
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 his renal  function  was increased creatinine ,\par patient is taking losartan /hctz\par \par  rarely had brief mild chest discomfort while he was flying , denies any chest pain  or dizziness ,  his blood work showed mild worsening of creatinine ,  low HDL  normal TG  LDL 42  despite on medication ,   his Hb A1c 5.8  . patient does not  drink water   his follow up creatinine 1.39  glucose 181 \par \par \par \par His blood work in hospital   creatinine 1.41    now increased 2.25\par \par his blood work   LDL 52  HDL 31     CT angio no acute PE   negative venous doppler

## 2020-11-16 NOTE — PHYSICAL EXAM
[General Appearance - Well Developed] : well developed [Normal Conjunctiva] : the conjunctiva exhibited no abnormalities [Normal Oral Mucosa] : normal oral mucosa [Normal Jugular Venous A Waves Present] : normal jugular venous A waves present [] : no respiratory distress [Respiration, Rhythm And Depth] : normal respiratory rhythm and effort [Exaggerated Use Of Accessory Muscles For Inspiration] : no accessory muscle use [Auscultation Breath Sounds / Voice Sounds] : lungs were clear to auscultation bilaterally [Chest Palpation] : palpation of the chest revealed no abnormalities [Lungs Percussion] : the lungs were normal to percussion [Heart Rate And Rhythm] : heart rate and rhythm were normal [Heart Sounds] : normal S1 and S2 [Arterial Pulses Normal] : the arterial pulses were normal [Edema] : no peripheral edema present [Veins - Varicosity Changes] : no varicosital changes were noted in the lower extremities [Systolic grade ___/6] : A grade [unfilled]/6 systolic murmur was heard. [Bowel Sounds] : normal bowel sounds [Abdomen Soft] : soft [FreeTextEntry1] : obese umbilical hernia  [Abnormal Walk] : normal gait [Nail Clubbing] : no clubbing of the fingernails [Skin Color & Pigmentation] : normal skin color and pigmentation [Oriented To Time, Place, And Person] : oriented to person, place, and time

## 2020-11-16 NOTE — ASSESSMENT
[FreeTextEntry1] : 75year old male with above hx\par \par intentional weight with diet ,exercise improved DM \par \par atypical chest pain , resolved  improved much with prilosec  ( resolved )   had negative nuclear perfusion scan , no PE , negative troponin ,  will continue  omeprazole 20 mg po daily \par \par controlled hypertension with hypertensive heart disease with abnormal EKG , continue  norvasc 10 mg po daily  \par  home BP reading , \par \par Sinus bradycardia chronic asymptomatic , continue to monitor \par \par Dyslipidemia  low HDl  continue medication \par \par CKD  Mild , stable   . \par \par DM    controlled  HB A1c 5.8   strict DM \par \par follow up after 3 months

## 2020-12-04 ENCOUNTER — LABORATORY RESULT (OUTPATIENT)
Age: 75
End: 2020-12-04

## 2020-12-14 ENCOUNTER — APPOINTMENT (OUTPATIENT)
Dept: UROLOGY | Facility: CLINIC | Age: 75
End: 2020-12-14
Payer: MEDICARE

## 2020-12-14 VITALS
DIASTOLIC BLOOD PRESSURE: 50 MMHG | SYSTOLIC BLOOD PRESSURE: 135 MMHG | HEIGHT: 71 IN | TEMPERATURE: 97.2 F | WEIGHT: 175 LBS | OXYGEN SATURATION: 100 % | HEART RATE: 52 BPM | BODY MASS INDEX: 24.5 KG/M2

## 2020-12-14 PROCEDURE — 99213 OFFICE O/P EST LOW 20 MIN: CPT

## 2020-12-15 NOTE — END OF VISIT
[FreeTextEntry3] : He will trial alpha blockade and will follow up with a transrectal ultrasound of the prostate gland

## 2020-12-15 NOTE — HISTORY OF PRESENT ILLNESS
[FreeTextEntry1] : This patient is here for regular checkup.  He is on finasteride but has noted a change in urinary habitus with increased frequency and slowing of the stream.

## 2021-01-04 ENCOUNTER — APPOINTMENT (OUTPATIENT)
Dept: UROLOGY | Facility: CLINIC | Age: 76
End: 2021-01-04
Payer: MEDICARE

## 2021-01-04 VITALS
HEIGHT: 71 IN | HEART RATE: 50 BPM | SYSTOLIC BLOOD PRESSURE: 115 MMHG | OXYGEN SATURATION: 97 % | TEMPERATURE: 97.6 F | DIASTOLIC BLOOD PRESSURE: 66 MMHG | WEIGHT: 177 LBS | BODY MASS INDEX: 24.78 KG/M2

## 2021-01-04 PROCEDURE — 76857 US EXAM PELVIC LIMITED: CPT

## 2021-01-04 PROCEDURE — 76872 US TRANSRECTAL: CPT

## 2021-01-04 PROCEDURE — 51741 ELECTRO-UROFLOWMETRY FIRST: CPT

## 2021-01-20 ENCOUNTER — APPOINTMENT (OUTPATIENT)
Dept: ENDOCRINOLOGY | Facility: CLINIC | Age: 76
End: 2021-01-20
Payer: MEDICARE

## 2021-01-20 VITALS
BODY MASS INDEX: 24.08 KG/M2 | DIASTOLIC BLOOD PRESSURE: 64 MMHG | HEART RATE: 58 BPM | WEIGHT: 172 LBS | HEIGHT: 71 IN | RESPIRATION RATE: 16 BRPM | OXYGEN SATURATION: 98 % | TEMPERATURE: 98.6 F | SYSTOLIC BLOOD PRESSURE: 124 MMHG

## 2021-01-20 PROCEDURE — 36415 COLL VENOUS BLD VENIPUNCTURE: CPT

## 2021-01-20 PROCEDURE — 99214 OFFICE O/P EST MOD 30 MIN: CPT | Mod: 25

## 2021-01-20 PROCEDURE — 82962 GLUCOSE BLOOD TEST: CPT

## 2021-01-20 PROCEDURE — 83036 HEMOGLOBIN GLYCOSYLATED A1C: CPT | Mod: QW

## 2021-01-20 RX ORDER — SITAGLIPTIN 50 MG/1
50 TABLET, FILM COATED ORAL
Qty: 90 | Refills: 1 | Status: DISCONTINUED | COMMUNITY
Start: 2020-07-02 | End: 2021-01-20

## 2021-01-20 RX ORDER — OMEPRAZOLE 20 MG/1
20 CAPSULE, DELAYED RELEASE ORAL
Qty: 90 | Refills: 1 | Status: DISCONTINUED | COMMUNITY
Start: 2019-10-04 | End: 2021-01-20

## 2021-02-08 ENCOUNTER — APPOINTMENT (OUTPATIENT)
Dept: UROLOGY | Facility: CLINIC | Age: 76
End: 2021-02-08
Payer: MEDICARE

## 2021-02-08 VITALS
HEART RATE: 46 BPM | OXYGEN SATURATION: 100 % | HEIGHT: 71 IN | WEIGHT: 172 LBS | SYSTOLIC BLOOD PRESSURE: 134 MMHG | BODY MASS INDEX: 24.08 KG/M2 | DIASTOLIC BLOOD PRESSURE: 70 MMHG

## 2021-02-08 DIAGNOSIS — R33.9 RETENTION OF URINE, UNSPECIFIED: ICD-10-CM

## 2021-02-08 PROCEDURE — 51784 ANAL/URINARY MUSCLE STUDY: CPT

## 2021-02-08 PROCEDURE — 51797 INTRAABDOMINAL PRESSURE TEST: CPT

## 2021-02-08 PROCEDURE — 51741 ELECTRO-UROFLOWMETRY FIRST: CPT

## 2021-02-08 PROCEDURE — 51798 US URINE CAPACITY MEASURE: CPT | Mod: 59

## 2021-02-08 PROCEDURE — 51728 CYSTOMETROGRAM W/VP: CPT

## 2021-02-11 ENCOUNTER — NON-APPOINTMENT (OUTPATIENT)
Age: 76
End: 2021-02-11

## 2021-02-11 ENCOUNTER — APPOINTMENT (OUTPATIENT)
Dept: CARDIOLOGY | Facility: CLINIC | Age: 76
End: 2021-02-11
Payer: MEDICARE

## 2021-02-11 VITALS
HEIGHT: 71 IN | WEIGHT: 172 LBS | OXYGEN SATURATION: 100 % | BODY MASS INDEX: 24.08 KG/M2 | HEART RATE: 42 BPM | DIASTOLIC BLOOD PRESSURE: 53 MMHG | SYSTOLIC BLOOD PRESSURE: 107 MMHG

## 2021-02-11 PROCEDURE — 93000 ELECTROCARDIOGRAM COMPLETE: CPT

## 2021-02-11 PROCEDURE — 99214 OFFICE O/P EST MOD 30 MIN: CPT

## 2021-02-11 NOTE — PHYSICAL EXAM
[General Appearance - Well Developed] : well developed [Normal Conjunctiva] : the conjunctiva exhibited no abnormalities [Normal Oral Mucosa] : normal oral mucosa [Normal Jugular Venous A Waves Present] : normal jugular venous A waves present [] : no respiratory distress [Respiration, Rhythm And Depth] : normal respiratory rhythm and effort [Exaggerated Use Of Accessory Muscles For Inspiration] : no accessory muscle use [Auscultation Breath Sounds / Voice Sounds] : lungs were clear to auscultation bilaterally [Chest Palpation] : palpation of the chest revealed no abnormalities [Lungs Percussion] : the lungs were normal to percussion [Heart Rate And Rhythm] : heart rate and rhythm were normal [Heart Sounds] : normal S1 and S2 [Arterial Pulses Normal] : the arterial pulses were normal [Edema] : no peripheral edema present [Veins - Varicosity Changes] : no varicosital changes were noted in the lower extremities [Systolic grade ___/6] : A grade [unfilled]/6 systolic murmur was heard. [Bowel Sounds] : normal bowel sounds [Abdomen Soft] : soft [Abnormal Walk] : normal gait [Nail Clubbing] : no clubbing of the fingernails [Skin Color & Pigmentation] : normal skin color and pigmentation [Oriented To Time, Place, And Person] : oriented to person, place, and time [FreeTextEntry1] : obese umbilical hernia

## 2021-02-11 NOTE — HISTORY OF PRESENT ILLNESS
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 patient does not  drink water   his follow up creatinine 1.39  glucose 181 \par \par \par \par His blood work in hospital   creatinine 1.41    now increased 2.25\par \par his blood work   LDL 52  HDL 31     CT angio no acute PE   negative venous doppler

## 2021-02-11 NOTE — ASSESSMENT
[FreeTextEntry1] : 75year old male with above hx\par \par intentional weight loss with diet ,exercise improved DM \par \par atypical chest pain , resolved  improved much with prilosec  ( resolved )   had negative nuclear perfusion scan , no PE , negative troponin ,  will continue  omeprazole 20 mg po daily \par \par controlled hypertension with hypertensive heart disease with abnormal EKG , continue  norvasc 10 mg po daily  \par  home BP reading , \par \par Sinus bradycardia chronic asymptomatic , continue to monitor \par \par Dyslipidemia  low HDl  continue medication \par \par CKD  Mild , stable   . \par \par DM    controlled  HB A1c 5.8   strict DM \par \par follow up after 3 months

## 2021-02-24 NOTE — PROGRESS NOTE ADULT - PROBLEM/PLAN-4
Medical Necessity Information: It is in your best interest to select a reason for this procedure from the list below. All of these items fulfill various CMS LCD requirements except lesion extends to a margin. DISPLAY PLAN FREE TEXT Include Z78.9 (Other Specified Conditions Influencing Health Status) As An Associated Diagnosis?: Yes Lab: [id number] Lab Facility: [id number] Previous Accession (Optional): [id number] Date Of Previous Biopsy (Optional): 07/18/17 Referring Physician (Optional): Azar Surgeon (Optional): Francis Size Of Lesion In Cm: 0.9 Size Of Margin In Cm: 0.3 Anesthesia Volume In Cc: 5.3 Excision Method: Elliptical Did You Provide Opioid Counseling: No Repair Type: Complex Suturegard Retention Suture: 2-0 Nylon Retention Suture Bite Size: 3 mm Length To Time In Minutes Device Was In Place: 10 Number Of Hemigard Strips Per Side: 1 Intermediate / Complex Repair - Final Wound Length In Cm: 4 Complex/Intermediate Repair Variations: Lazy S Width Of Defect Perpendicular To Closure In Cm (Required): 1.5 Distance Of Undermining In Cm (Required): 2 Undermining Type: Entire Wound Debridement Text: The wound edges were debrided prior to proceeding with the closure to facilitate wound healing. Helical Rim Text: The closure involved the helical rim. Vermilion Border Text: The closure involved the vermilion border. Nostril Rim Text: The closure involved the nostril rim. Retention Suture Text: Retention sutures were placed to support the closure and prevent dehiscence. Secondary Defect Length (In Cm): 0 Suture Removal: 10 days Epidermal Closure Graft Donor Site (Optional): simple interrupted Graft Donor Site Bandage (Optional-Leave Blank If You Don't Want In Note): Steri-strips and a pressure bandage were applied to the donor site. Detail Level: Detailed Excision Depth: adipose tissue Scalpel Size: 15 blade Anesthesia Type: 1% lidocaine with epinephrine and a 1:10 solution of 8.4% sodium bicarbonate Hemostasis: Electrocoagulation Estimated Blood Loss (Cc): minimal Deep Sutures: 4-0 Poly([id number]) Dermal Closure: buried vertical mattress Epidermal Sutures: 4-0 Polypropylene Epidermal Closure: running subcuticular Wound Care: Petrolatum Dressing: steri-strips and pressure dressing Suturegard Intro: Intraoperative tissue expansion was performed, utilizing the SUTUREGARD device, in order to reduce wound tension. Suturegard Body: The suture ends were repeatedly re-tightened and re-clamped to achieve the desired tissue expansion. Hemigard Intro: Due to skin fragility and wound tension, it was decided to use HEMIGARD adhesive retention suture devices to permit a linear closure. The skin was cleaned and dried for a 6cm distance away from the wound. Excessive hair, if present, was removed to allow for adhesion. Hemigard Postcare Instructions: The HEMIGARD strips are to remain completely dry for at least 5-7 days. Complex Repair Preamble Text (Leave Blank If You Do Not Want): Extensive wide undermining was performed. Intermediate Repair Preamble Text (Leave Blank If You Do Not Want): Undermining was performed with blunt dissection. Fusiform Excision Additional Text (Leave Blank If You Do Not Want): The margin was drawn around the clinically apparent lesion.  A fusiform shape was then drawn on the skin incorporating the lesion and margins.  Incisions were then made along these lines to the appropriate tissue plane and the lesion was extirpated. Eliptical Excision Additional Text (Leave Blank If You Do Not Want): The margin was drawn around the clinically apparent lesion.  An elliptical shape was then drawn on the skin incorporating the lesion and margins.  Incisions were then made along these lines to the appropriate tissue plane and the lesion was extirpated. Saucerization Excision Additional Text (Leave Blank If You Do Not Want): The margin was drawn around the clinically apparent lesion.  Incisions were then made along these lines, in a tangential fashion, to the appropriate tissue plane and the lesion was extirpated. Slit Excision Additional Text (Leave Blank If You Do Not Want): A linear line was drawn on the skin overlying the lesion. An incision was made slowly until the lesion was visualized.  Once visualized, the lesion was removed with blunt dissection. Excisional Biopsy Additional Text (Leave Blank If You Do Not Want): The margin was drawn around the clinically apparent lesion. An elliptical shape was then drawn on the skin incorporating the lesion and margins.  Incisions were then made along these lines to the appropriate tissue plane and the lesion was extirpated. Perilesional Excision Additional Text (Leave Blank If You Do Not Want): The margin was drawn around the clinically apparent lesion. Incisions were then made along these lines to the appropriate tissue plane and the lesion was extirpated. Repair Performed By Another Provider Text (Leave Blank If You Do Not Want): After the tissue was excised the defect was repaired by another provider. No Repair - Repaired With Adjacent Surgical Defect Text (Leave Blank If You Do Not Want): After the excision the defect was repaired concurrently with another surgical defect which was in close approximation. Advancement Flap (Single) Text: The defect edges were debeveled with a #15 scalpel blade.  Given the location of the defect and the proximity to free margins a single advancement flap was deemed most appropriate.  Using a sterile surgical marker, an appropriate advancement flap was drawn incorporating the defect and placing the expected incisions within the relaxed skin tension lines where possible.    The area thus outlined was incised deep to adipose tissue with a #15 scalpel blade.  The skin margins were undermined to an appropriate distance in all directions utilizing iris scissors. Advancement Flap (Double) Text: The defect edges were debeveled with a #15 scalpel blade.  Given the location of the defect and the proximity to free margins a double advancement flap was deemed most appropriate.  Using a sterile surgical marker, the appropriate advancement flaps were drawn incorporating the defect and placing the expected incisions within the relaxed skin tension lines where possible.    The area thus outlined was incised deep to adipose tissue with a #15 scalpel blade.  The skin margins were undermined to an appropriate distance in all directions utilizing iris scissors. Burow's Advancement Flap Text: The defect edges were debeveled with a #15 scalpel blade.  Given the location of the defect and the proximity to free margins a Burow's advancement flap was deemed most appropriate.  Using a sterile surgical marker, the appropriate advancement flap was drawn incorporating the defect and placing the expected incisions within the relaxed skin tension lines where possible.    The area thus outlined was incised deep to adipose tissue with a #15 scalpel blade.  The skin margins were undermined to an appropriate distance in all directions utilizing iris scissors. Chonodrocutaneous Helical Advancement Flap Text: The defect edges were debeveled with a #15 scalpel blade.  Given the location of the defect and the proximity to free margins a chondrocutaneous helical advancement flap was deemed most appropriate.  Using a sterile surgical marker, the appropriate advancement flap was drawn incorporating the defect and placing the expected incisions within the relaxed skin tension lines where possible.    The area thus outlined was incised deep to adipose tissue with a #15 scalpel blade.  The skin margins were undermined to an appropriate distance in all directions utilizing iris scissors. Crescentic Advancement Flap Text: The defect edges were debeveled with a #15 scalpel blade.  Given the location of the defect and the proximity to free margins a crescentic advancement flap was deemed most appropriate.  Using a sterile surgical marker, the appropriate advancement flap was drawn incorporating the defect and placing the expected incisions within the relaxed skin tension lines where possible.    The area thus outlined was incised deep to adipose tissue with a #15 scalpel blade.  The skin margins were undermined to an appropriate distance in all directions utilizing iris scissors. A-T Advancement Flap Text: The defect edges were debeveled with a #15 scalpel blade.  Given the location of the defect, shape of the defect and the proximity to free margins an A-T advancement flap was deemed most appropriate.  Using a sterile surgical marker, an appropriate advancement flap was drawn incorporating the defect and placing the expected incisions within the relaxed skin tension lines where possible.    The area thus outlined was incised deep to adipose tissue with a #15 scalpel blade.  The skin margins were undermined to an appropriate distance in all directions utilizing iris scissors. O-T Advancement Flap Text: The defect edges were debeveled with a #15 scalpel blade.  Given the location of the defect, shape of the defect and the proximity to free margins an O-T advancement flap was deemed most appropriate.  Using a sterile surgical marker, an appropriate advancement flap was drawn incorporating the defect and placing the expected incisions within the relaxed skin tension lines where possible.    The area thus outlined was incised deep to adipose tissue with a #15 scalpel blade.  The skin margins were undermined to an appropriate distance in all directions utilizing iris scissors. O-L Flap Text: The defect edges were debeveled with a #15 scalpel blade.  Given the location of the defect, shape of the defect and the proximity to free margins an O-L flap was deemed most appropriate.  Using a sterile surgical marker, an appropriate advancement flap was drawn incorporating the defect and placing the expected incisions within the relaxed skin tension lines where possible.    The area thus outlined was incised deep to adipose tissue with a #15 scalpel blade.  The skin margins were undermined to an appropriate distance in all directions utilizing iris scissors. O-Z Flap Text: The defect edges were debeveled with a #15 scalpel blade.  Given the location of the defect, shape of the defect and the proximity to free margins an O-Z flap was deemed most appropriate.  Using a sterile surgical marker, an appropriate transposition flap was drawn incorporating the defect and placing the expected incisions within the relaxed skin tension lines where possible. The area thus outlined was incised deep to adipose tissue with a #15 scalpel blade.  The skin margins were undermined to an appropriate distance in all directions utilizing iris scissors. Double O-Z Flap Text: The defect edges were debeveled with a #15 scalpel blade.  Given the location of the defect, shape of the defect and the proximity to free margins a Double O-Z flap was deemed most appropriate.  Using a sterile surgical marker, an appropriate transposition flap was drawn incorporating the defect and placing the expected incisions within the relaxed skin tension lines where possible. The area thus outlined was incised deep to adipose tissue with a #15 scalpel blade.  The skin margins were undermined to an appropriate distance in all directions utilizing iris scissors. V-Y Flap Text: The defect edges were debeveled with a #15 scalpel blade.  Given the location of the defect, shape of the defect and the proximity to free margins a V-Y flap was deemed most appropriate.  Using a sterile surgical marker, an appropriate advancement flap was drawn incorporating the defect and placing the expected incisions within the relaxed skin tension lines where possible.    The area thus outlined was incised deep to adipose tissue with a #15 scalpel blade.  The skin margins were undermined to an appropriate distance in all directions utilizing iris scissors. Advancement-Rotation Flap Text: The defect edges were debeveled with a #15 scalpel blade.  Given the location of the defect, shape of the defect and the proximity to free margins an advancement-rotation flap was deemed most appropriate.  Using a sterile surgical marker, an appropriate flap was drawn incorporating the defect and placing the expected incisions within the relaxed skin tension lines where possible. The area thus outlined was incised deep to adipose tissue with a #15 scalpel blade.  The skin margins were undermined to an appropriate distance in all directions utilizing iris scissors. Mercedes Flap Text: The defect edges were debeveled with a #15 scalpel blade.  Given the location of the defect, shape of the defect and the proximity to free margins a Mercedes flap was deemed most appropriate.  Using a sterile surgical marker, an appropriate advancement flap was drawn incorporating the defect and placing the expected incisions within the relaxed skin tension lines where possible. The area thus outlined was incised deep to adipose tissue with a #15 scalpel blade.  The skin margins were undermined to an appropriate distance in all directions utilizing iris scissors. Modified Advancement Flap Text: The defect edges were debeveled with a #15 scalpel blade.  Given the location of the defect, shape of the defect and the proximity to free margins a modified advancement flap was deemed most appropriate.  Using a sterile surgical marker, an appropriate advancement flap was drawn incorporating the defect and placing the expected incisions within the relaxed skin tension lines where possible.    The area thus outlined was incised deep to adipose tissue with a #15 scalpel blade.  The skin margins were undermined to an appropriate distance in all directions utilizing iris scissors. Mucosal Advancement Flap Text: Given the location of the defect, shape of the defect and the proximity to free margins a mucosal advancement flap was deemed most appropriate. Incisions were made with a 15 blade scalpel in the appropriate fashion along the cutaneous vermillion border and the mucosal lip. The remaining actinically damaged mucosal tissue was excised.  The mucosal advancement flap was then elevated to the gingival sulcus with care taken to preserve the neurovascular structures and advanced into the primary defect. Care was taken to ensure that precise realignment of the vermilion border was achieved. Peng Advancement Flap Text: The defect edges were debeveled with a #15 scalpel blade.  Given the location of the defect, shape of the defect and the proximity to free margins a Peng advancement flap was deemed most appropriate.  Using a sterile surgical marker, an appropriate advancement flap was drawn incorporating the defect and placing the expected incisions within the relaxed skin tension lines where possible. The area thus outlined was incised deep to adipose tissue with a #15 scalpel blade.  The skin margins were undermined to an appropriate distance in all directions utilizing iris scissors. Hatchet Flap Text: The defect edges were debeveled with a #15 scalpel blade.  Given the location of the defect, shape of the defect and the proximity to free margins a hatchet flap was deemed most appropriate.  Using a sterile surgical marker, an appropriate hatchet flap was drawn incorporating the defect and placing the expected incisions within the relaxed skin tension lines where possible.    The area thus outlined was incised deep to adipose tissue with a #15 scalpel blade.  The skin margins were undermined to an appropriate distance in all directions utilizing iris scissors. Rotation Flap Text: The defect edges were debeveled with a #15 scalpel blade.  Given the location of the defect, shape of the defect and the proximity to free margins a rotation flap was deemed most appropriate.  Using a sterile surgical marker, an appropriate rotation flap was drawn incorporating the defect and placing the expected incisions within the relaxed skin tension lines where possible.    The area thus outlined was incised deep to adipose tissue with a #15 scalpel blade.  The skin margins were undermined to an appropriate distance in all directions utilizing iris scissors. Spiral Flap Text: The defect edges were debeveled with a #15 scalpel blade.  Given the location of the defect, shape of the defect and the proximity to free margins a spiral flap was deemed most appropriate.  Using a sterile surgical marker, an appropriate rotation flap was drawn incorporating the defect and placing the expected incisions within the relaxed skin tension lines where possible. The area thus outlined was incised deep to adipose tissue with a #15 scalpel blade.  The skin margins were undermined to an appropriate distance in all directions utilizing iris scissors. Star Wedge Flap Text: The defect edges were debeveled with a #15 scalpel blade.  Given the location of the defect, shape of the defect and the proximity to free margins a star wedge flap was deemed most appropriate.  Using a sterile surgical marker, an appropriate rotation flap was drawn incorporating the defect and placing the expected incisions within the relaxed skin tension lines where possible. The area thus outlined was incised deep to adipose tissue with a #15 scalpel blade.  The skin margins were undermined to an appropriate distance in all directions utilizing iris scissors. Transposition Flap Text: The defect edges were debeveled with a #15 scalpel blade.  Given the location of the defect and the proximity to free margins a transposition flap was deemed most appropriate.  Using a sterile surgical marker, an appropriate transposition flap was drawn incorporating the defect.    The area thus outlined was incised deep to adipose tissue with a #15 scalpel blade.  The skin margins were undermined to an appropriate distance in all directions utilizing iris scissors. Muscle Hinge Flap Text: The defect edges were debeveled with a #15 scalpel blade.  Given the size, depth and location of the defect and the proximity to free margins a muscle hinge flap was deemed most appropriate.  Using a sterile surgical marker, an appropriate hinge flap was drawn incorporating the defect. The area thus outlined was incised with a #15 scalpel blade.  The skin margins were undermined to an appropriate distance in all directions utilizing iris scissors. Nasal Turnover Hinge Flap Text: The defect edges were debeveled with a #15 scalpel blade.  Given the size, depth, location of the defect and the defect being full thickness a nasal turnover hinge flap was deemed most appropriate.  Using a sterile surgical marker, an appropriate hinge flap was drawn incorporating the defect. The area thus outlined was incised with a #15 scalpel blade. The flap was designed to recreate the nasal mucosal lining and the alar rim. The skin margins were undermined to an appropriate distance in all directions utilizing iris scissors. Nasalis-Muscle-Based Myocutaneous Island Pedicle Flap Text: Using a #15 blade, an incision was made around the donor flap to the level of the nasalis muscle. Wide lateral undermining was then performed in both the subcutaneous plane above the nasalis muscle, and in a submuscular plane just above periosteum. This allowed the formation of a free nasalis muscle axial pedicle (based on the angular artery) which was still attached to the actual cutaneous flap, increasing its mobility and vascular viability. Hemostasis was obtained with pinpoint electrocoagulation. The flap was mobilized into position and the pivotal anchor points positioned and stabilized with buried interrupted sutures. Subcutaneous and dermal tissues were closed in a multilayered fashion with sutures. Tissue redundancies were excised, and the epidermal edges were apposed without significant tension and sutured with sutures. Orbicularis Oris Muscle Flap Text: The defect edges were debeveled with a #15 scalpel blade.  Given that the defect affected the competency of the oral sphincter an obicularis oris muscle flap was deemed most appropriate to restore this competency and normal muscle function.  Using a sterile surgical marker, an appropriate flap was drawn incorporating the defect. The area thus outlined was incised with a #15 scalpel blade. Melolabial Transposition Flap Text: The defect edges were debeveled with a #15 scalpel blade.  Given the location of the defect and the proximity to free margins a melolabial flap was deemed most appropriate.  Using a sterile surgical marker, an appropriate melolabial transposition flap was drawn incorporating the defect.    The area thus outlined was incised deep to adipose tissue with a #15 scalpel blade.  The skin margins were undermined to an appropriate distance in all directions utilizing iris scissors. Rhombic Flap Text: The defect edges were debeveled with a #15 scalpel blade.  Given the location of the defect and the proximity to free margins a rhombic flap was deemed most appropriate.  Using a sterile surgical marker, an appropriate rhombic flap was drawn incorporating the defect.    The area thus outlined was incised deep to adipose tissue with a #15 scalpel blade.  The skin margins were undermined to an appropriate distance in all directions utilizing iris scissors. Rhomboid Transposition Flap Text: The defect edges were debeveled with a #15 scalpel blade.  Given the location of the defect and the proximity to free margins a rhomboid transposition flap was deemed most appropriate.  Using a sterile surgical marker, an appropriate rhomboid flap was drawn incorporating the defect.    The area thus outlined was incised deep to adipose tissue with a #15 scalpel blade.  The skin margins were undermined to an appropriate distance in all directions utilizing iris scissors. Bi-Rhombic Flap Text: The defect edges were debeveled with a #15 scalpel blade.  Given the location of the defect and the proximity to free margins a bi-rhombic flap was deemed most appropriate.  Using a sterile surgical marker, an appropriate rhombic flap was drawn incorporating the defect. The area thus outlined was incised deep to adipose tissue with a #15 scalpel blade.  The skin margins were undermined to an appropriate distance in all directions utilizing iris scissors. Helical Rim Advancement Flap Text: The defect edges were debeveled with a #15 blade scalpel.  Given the location of the defect and the proximity to free margins (helical rim) a double helical rim advancement flap was deemed most appropriate.  Using a sterile surgical marker, the appropriate advancement flaps were drawn incorporating the defect and placing the expected incisions between the helical rim and antihelix where possible.  The area thus outlined was incised through and through with a #15 scalpel blade.  With a skin hook and iris scissors, the flaps were gently and sharply undermined and freed up. Bilateral Helical Rim Advancement Flap Text: The defect edges were debeveled with a #15 blade scalpel.  Given the location of the defect and the proximity to free margins (helical rim) a bilateral helical rim advancement flap was deemed most appropriate.  Using a sterile surgical marker, the appropriate advancement flaps were drawn incorporating the defect and placing the expected incisions between the helical rim and antihelix where possible.  The area thus outlined was incised through and through with a #15 scalpel blade.  With a skin hook and iris scissors, the flaps were gently and sharply undermined and freed up. Ear Star Wedge Flap Text: The defect edges were debeveled with a #15 blade scalpel.  Given the location of the defect and the proximity to free margins (helical rim) an ear star wedge flap was deemed most appropriate.  Using a sterile surgical marker, the appropriate flap was drawn incorporating the defect and placing the expected incisions between the helical rim and antihelix where possible.  The area thus outlined was incised through and through with a #15 scalpel blade. Banner Transposition Flap Text: The defect edges were debeveled with a #15 scalpel blade.  Given the location of the defect and the proximity to free margins a Banner transposition flap was deemed most appropriate.  Using a sterile surgical marker, an appropriate flap drawn around the defect. The area thus outlined was incised deep to adipose tissue with a #15 scalpel blade.  The skin margins were undermined to an appropriate distance in all directions utilizing iris scissors. Bilobed Flap Text: The defect edges were debeveled with a #15 scalpel blade.  Given the location of the defect and the proximity to free margins a bilobe flap was deemed most appropriate.  Using a sterile surgical marker, an appropriate bilobe flap drawn around the defect.    The area thus outlined was incised deep to adipose tissue with a #15 scalpel blade.  The skin margins were undermined to an appropriate distance in all directions utilizing iris scissors. Bilobed Transposition Flap Text: The defect edges were debeveled with a #15 scalpel blade.  Given the location of the defect and the proximity to free margins a bilobed transposition flap was deemed most appropriate.  Using a sterile surgical marker, an appropriate bilobe flap drawn around the defect.    The area thus outlined was incised deep to adipose tissue with a #15 scalpel blade.  The skin margins were undermined to an appropriate distance in all directions utilizing iris scissors. Trilobed Flap Text: The defect edges were debeveled with a #15 scalpel blade.  Given the location of the defect and the proximity to free margins a trilobed flap was deemed most appropriate.  Using a sterile surgical marker, an appropriate trilobed flap drawn around the defect.    The area thus outlined was incised deep to adipose tissue with a #15 scalpel blade.  The skin margins were undermined to an appropriate distance in all directions utilizing iris scissors. Dorsal Nasal Flap Text: The defect edges were debeveled with a #15 scalpel blade.  Given the location of the defect and the proximity to free margins a dorsal nasal flap was deemed most appropriate.  Using a sterile surgical marker, an appropriate dorsal nasal flap was drawn around the defect.    The area thus outlined was incised deep to adipose tissue with a #15 scalpel blade.  The skin margins were undermined to an appropriate distance in all directions utilizing iris scissors. Island Pedicle Flap Text: The defect edges were debeveled with a #15 scalpel blade.  Given the location of the defect, shape of the defect and the proximity to free margins an island pedicle advancement flap was deemed most appropriate.  Using a sterile surgical marker, an appropriate advancement flap was drawn incorporating the defect, outlining the appropriate donor tissue and placing the expected incisions within the relaxed skin tension lines where possible.    The area thus outlined was incised deep to adipose tissue with a #15 scalpel blade.  The skin margins were undermined to an appropriate distance in all directions around the primary defect and laterally outward around the island pedicle utilizing iris scissors.  There was minimal undermining beneath the pedicle flap. Island Pedicle Flap With Canthal Suspension Text: The defect edges were debeveled with a #15 scalpel blade.  Given the location of the defect, shape of the defect and the proximity to free margins an island pedicle advancement flap was deemed most appropriate.  Using a sterile surgical marker, an appropriate advancement flap was drawn incorporating the defect, outlining the appropriate donor tissue and placing the expected incisions within the relaxed skin tension lines where possible. The area thus outlined was incised deep to adipose tissue with a #15 scalpel blade.  The skin margins were undermined to an appropriate distance in all directions around the primary defect and laterally outward around the island pedicle utilizing iris scissors.  There was minimal undermining beneath the pedicle flap. A suspension suture was placed in the canthal tendon to prevent tension and prevent ectropion. Alar Island Pedicle Flap Text: The defect edges were debeveled with a #15 scalpel blade.  Given the location of the defect, shape of the defect and the proximity to the alar rim an island pedicle advancement flap was deemed most appropriate.  Using a sterile surgical marker, an appropriate advancement flap was drawn incorporating the defect, outlining the appropriate donor tissue and placing the expected incisions within the nasal ala running parallel to the alar rim. The area thus outlined was incised with a #15 scalpel blade.  The skin margins were undermined minimally to an appropriate distance in all directions around the primary defect and laterally outward around the island pedicle utilizing iris scissors.  There was minimal undermining beneath the pedicle flap. Double Island Pedicle Flap Text: The defect edges were debeveled with a #15 scalpel blade.  Given the location of the defect, shape of the defect and the proximity to free margins a double island pedicle advancement flap was deemed most appropriate.  Using a sterile surgical marker, an appropriate advancement flap was drawn incorporating the defect, outlining the appropriate donor tissue and placing the expected incisions within the relaxed skin tension lines where possible.    The area thus outlined was incised deep to adipose tissue with a #15 scalpel blade.  The skin margins were undermined to an appropriate distance in all directions around the primary defect and laterally outward around the island pedicle utilizing iris scissors.  There was minimal undermining beneath the pedicle flap. Island Pedicle Flap-Requiring Vessel Identification Text: The defect edges were debeveled with a #15 scalpel blade.  Given the location of the defect, shape of the defect and the proximity to free margins an island pedicle advancement flap was deemed most appropriate.  Using a sterile surgical marker, an appropriate advancement flap was drawn, based on the axial vessel mentioned above, incorporating the defect, outlining the appropriate donor tissue and placing the expected incisions within the relaxed skin tension lines where possible.    The area thus outlined was incised deep to adipose tissue with a #15 scalpel blade.  The skin margins were undermined to an appropriate distance in all directions around the primary defect and laterally outward around the island pedicle utilizing iris scissors.  There was minimal undermining beneath the pedicle flap. Keystone Flap Text: The defect edges were debeveled with a #15 scalpel blade.  Given the location of the defect, shape of the defect a keystone flap was deemed most appropriate.  Using a sterile surgical marker, an appropriate keystone flap was drawn incorporating the defect, outlining the appropriate donor tissue and placing the expected incisions within the relaxed skin tension lines where possible. The area thus outlined was incised deep to adipose tissue with a #15 scalpel blade.  The skin margins were undermined to an appropriate distance in all directions around the primary defect and laterally outward around the flap utilizing iris scissors. O-T Plasty Text: The defect edges were debeveled with a #15 scalpel blade.  Given the location of the defect, shape of the defect and the proximity to free margins an O-T plasty was deemed most appropriate.  Using a sterile surgical marker, an appropriate O-T plasty was drawn incorporating the defect and placing the expected incisions within the relaxed skin tension lines where possible.    The area thus outlined was incised deep to adipose tissue with a #15 scalpel blade.  The skin margins were undermined to an appropriate distance in all directions utilizing iris scissors. O-Z Plasty Text: The defect edges were debeveled with a #15 scalpel blade.  Given the location of the defect, shape of the defect and the proximity to free margins an O-Z plasty (double transposition flap) was deemed most appropriate.  Using a sterile surgical marker, the appropriate transposition flaps were drawn incorporating the defect and placing the expected incisions within the relaxed skin tension lines where possible.    The area thus outlined was incised deep to adipose tissue with a #15 scalpel blade.  The skin margins were undermined to an appropriate distance in all directions utilizing iris scissors.  Hemostasis was achieved with electrocautery.  The flaps were then transposed into place, one clockwise and the other counterclockwise, and anchored with interrupted buried subcutaneous sutures. Double O-Z Plasty Text: The defect edges were debeveled with a #15 scalpel blade.  Given the location of the defect, shape of the defect and the proximity to free margins a Double O-Z plasty (double transposition flap) was deemed most appropriate.  Using a sterile surgical marker, the appropriate transposition flaps were drawn incorporating the defect and placing the expected incisions within the relaxed skin tension lines where possible. The area thus outlined was incised deep to adipose tissue with a #15 scalpel blade.  The skin margins were undermined to an appropriate distance in all directions utilizing iris scissors.  Hemostasis was achieved with electrocautery.  The flaps were then transposed into place, one clockwise and the other counterclockwise, and anchored with interrupted buried subcutaneous sutures. V-Y Plasty Text: The defect edges were debeveled with a #15 scalpel blade.  Given the location of the defect, shape of the defect and the proximity to free margins an V-Y advancement flap was deemed most appropriate.  Using a sterile surgical marker, an appropriate advancement flap was drawn incorporating the defect and placing the expected incisions within the relaxed skin tension lines where possible.    The area thus outlined was incised deep to adipose tissue with a #15 scalpel blade.  The skin margins were undermined to an appropriate distance in all directions utilizing iris scissors. H Plasty Text: Given the location of the defect, shape of the defect and the proximity to free margins a H-plasty was deemed most appropriate for repair.  Using a sterile surgical marker, the appropriate advancement arms of the H-plasty were drawn incorporating the defect and placing the expected incisions within the relaxed skin tension lines where possible. The area thus outlined was incised deep to adipose tissue with a #15 scalpel blade. The skin margins were undermined to an appropriate distance in all directions utilizing iris scissors.  The opposing advancement arms were then advanced into place in opposite direction and anchored with interrupted buried subcutaneous sutures. W Plasty Text: The lesion was extirpated to the level of the fat with a #15 scalpel blade.  Given the location of the defect, shape of the defect and the proximity to free margins a W-plasty was deemed most appropriate for repair.  Using a sterile surgical marker, the appropriate transposition arms of the W-plasty were drawn incorporating the defect and placing the expected incisions within the relaxed skin tension lines where possible.    The area thus outlined was incised deep to adipose tissue with a #15 scalpel blade.  The skin margins were undermined to an appropriate distance in all directions utilizing iris scissors.  The opposing transposition arms were then transposed into place in opposite direction and anchored with interrupted buried subcutaneous sutures. Z Plasty Text: The lesion was extirpated to the level of the fat with a #15 scalpel blade.  Given the location of the defect, shape of the defect and the proximity to free margins a Z-plasty was deemed most appropriate for repair.  Using a sterile surgical marker, the appropriate transposition arms of the Z-plasty were drawn incorporating the defect and placing the expected incisions within the relaxed skin tension lines where possible.    The area thus outlined was incised deep to adipose tissue with a #15 scalpel blade.  The skin margins were undermined to an appropriate distance in all directions utilizing iris scissors.  The opposing transposition arms were then transposed into place in opposite direction and anchored with interrupted buried subcutaneous sutures. Zygomaticofacial Flap Text: Given the location of the defect, shape of the defect and the proximity to free margins a zygomaticofacial flap was deemed most appropriate for repair.  Using a sterile surgical marker, the appropriate flap was drawn incorporating the defect and placing the expected incisions within the relaxed skin tension lines where possible. The area thus outlined was incised deep to adipose tissue with a #15 scalpel blade with preservation of a vascular pedicle.  The skin margins were undermined to an appropriate distance in all directions utilizing iris scissors.  The flap was then placed into the defect and anchored with interrupted buried subcutaneous sutures. Cheek Interpolation Flap Text: A decision was made to reconstruct the defect utilizing an interpolation axial flap and a staged reconstruction.  A telfa template was made of the defect.  This telfa template was then used to outline the Cheek Interpolation flap.  The donor area for the pedicle flap was then injected with anesthesia.  The flap was excised through the skin and subcutaneous tissue down to the layer of the underlying musculature.  The interpolation flap was carefully excised within this deep plane to maintain its blood supply.  The edges of the donor site were undermined.   The donor site was closed in a primary fashion.  The pedicle was then rotated into position and sutured.  Once the tube was sutured into place, adequate blood supply was confirmed with blanching and refill.  The pedicle was then wrapped with xeroform gauze and dressed appropriately with a telfa and gauze bandage to ensure continued blood supply and protect the attached pedicle. Cheek-To-Nose Interpolation Flap Text: A decision was made to reconstruct the defect utilizing an interpolation axial flap and a staged reconstruction.  A telfa template was made of the defect.  This telfa template was then used to outline the Cheek-To-Nose Interpolation flap.  The donor area for the pedicle flap was then injected with anesthesia.  The flap was excised through the skin and subcutaneous tissue down to the layer of the underlying musculature.  The interpolation flap was carefully excised within this deep plane to maintain its blood supply.  The edges of the donor site were undermined.   The donor site was closed in a primary fashion.  The pedicle was then rotated into position and sutured.  Once the tube was sutured into place, adequate blood supply was confirmed with blanching and refill.  The pedicle was then wrapped with xeroform gauze and dressed appropriately with a telfa and gauze bandage to ensure continued blood supply and protect the attached pedicle. Interpolation Flap Text: A decision was made to reconstruct the defect utilizing an interpolation axial flap and a staged reconstruction.  A telfa template was made of the defect.  This telfa template was then used to outline the interpolation flap.  The donor area for the pedicle flap was then injected with anesthesia.  The flap was excised through the skin and subcutaneous tissue down to the layer of the underlying musculature.  The interpolation flap was carefully excised within this deep plane to maintain its blood supply.  The edges of the donor site were undermined.   The donor site was closed in a primary fashion.  The pedicle was then rotated into position and sutured.  Once the tube was sutured into place, adequate blood supply was confirmed with blanching and refill.  The pedicle was then wrapped with xeroform gauze and dressed appropriately with a telfa and gauze bandage to ensure continued blood supply and protect the attached pedicle. Melolabial Interpolation Flap Text: A decision was made to reconstruct the defect utilizing an interpolation axial flap and a staged reconstruction.  A telfa template was made of the defect.  This telfa template was then used to outline the melolabial interpolation flap.  The donor area for the pedicle flap was then injected with anesthesia.  The flap was excised through the skin and subcutaneous tissue down to the layer of the underlying musculature.  The pedicle flap was carefully excised within this deep plane to maintain its blood supply.  The edges of the donor site were undermined.   The donor site was closed in a primary fashion.  The pedicle was then rotated into position and sutured.  Once the tube was sutured into place, adequate blood supply was confirmed with blanching and refill.  The pedicle was then wrapped with xeroform gauze and dressed appropriately with a telfa and gauze bandage to ensure continued blood supply and protect the attached pedicle. Mastoid Interpolation Flap Text: A decision was made to reconstruct the defect utilizing an interpolation axial flap and a staged reconstruction.  A telfa template was made of the defect.  This telfa template was then used to outline the mastoid interpolation flap.  The donor area for the pedicle flap was then injected with anesthesia.  The flap was excised through the skin and subcutaneous tissue down to the layer of the underlying musculature.  The pedicle flap was carefully excised within this deep plane to maintain its blood supply.  The edges of the donor site were undermined.   The donor site was closed in a primary fashion.  The pedicle was then rotated into position and sutured.  Once the tube was sutured into place, adequate blood supply was confirmed with blanching and refill.  The pedicle was then wrapped with xeroform gauze and dressed appropriately with a telfa and gauze bandage to ensure continued blood supply and protect the attached pedicle. Posterior Auricular Interpolation Flap Text: A decision was made to reconstruct the defect utilizing an interpolation axial flap and a staged reconstruction.  A telfa template was made of the defect.  This telfa template was then used to outline the posterior auricular interpolation flap.  The donor area for the pedicle flap was then injected with anesthesia.  The flap was excised through the skin and subcutaneous tissue down to the layer of the underlying musculature.  The pedicle flap was carefully excised within this deep plane to maintain its blood supply.  The edges of the donor site were undermined.   The donor site was closed in a primary fashion.  The pedicle was then rotated into position and sutured.  Once the tube was sutured into place, adequate blood supply was confirmed with blanching and refill.  The pedicle was then wrapped with xeroform gauze and dressed appropriately with a telfa and gauze bandage to ensure continued blood supply and protect the attached pedicle. Paramedian Forehead Flap Text: A decision was made to reconstruct the defect utilizing an interpolation axial flap and a staged reconstruction.  A telfa template was made of the defect.  This telfa template was then used to outline the paramedian forehead pedicle flap.  The donor area for the pedicle flap was then injected with anesthesia.  The flap was excised through the skin and subcutaneous tissue down to the layer of the underlying musculature.  The pedicle flap was carefully excised within this deep plane to maintain its blood supply.  The edges of the donor site were undermined.   The donor site was closed in a primary fashion.  The pedicle was then rotated into position and sutured.  Once the tube was sutured into place, adequate blood supply was confirmed with blanching and refill.  The pedicle was then wrapped with xeroform gauze and dressed appropriately with a telfa and gauze bandage to ensure continued blood supply and protect the attached pedicle. Lip Wedge Excision Repair Text: Given the location of the defect and the proximity to free margins a full thickness wedge repair was deemed most appropriate.  Using a sterile surgical marker, the appropriate repair was drawn incorporating the defect and placing the expected incisions perpendicular to the vermilion border.  The vermilion border was also meticulously outlined to ensure appropriate reapproximation during the repair.  The area thus outlined was incised through and through with a #15 scalpel blade.  The muscularis and dermis were reaproximated with deep sutures following hemostasis. Care was taken to realign the vermilion border before proceeding with the superficial closure.  Once the vermilion was realigned the superfical and mucosal closure was finished. Ftsg Text: The defect edges were debeveled with a #15 scalpel blade.  Given the location of the defect, shape of the defect and the proximity to free margins a full thickness skin graft was deemed most appropriate.  Using a sterile surgical marker, the primary defect shape was transferred to the donor site. The area thus outlined was incised deep to adipose tissue with a #15 scalpel blade.  The harvested graft was then trimmed of adipose tissue until only dermis and epidermis was left.  The skin margins of the secondary defect were undermined to an appropriate distance in all directions utilizing iris scissors.  The secondary defect was closed with interrupted buried subcutaneous sutures.  The skin edges were then re-apposed with running  sutures.  The skin graft was then placed in the primary defect and oriented appropriately. Split-Thickness Skin Graft Text: The defect edges were debeveled with a #15 scalpel blade.  Given the location of the defect, shape of the defect and the proximity to free margins a split thickness skin graft was deemed most appropriate.  Using a sterile surgical marker, the primary defect shape was transferred to the donor site. The split thickness graft was then harvested.  The skin graft was then placed in the primary defect and oriented appropriately. Burow's Graft Text: The defect edges were debeveled with a #15 scalpel blade.  Given the location of the defect, shape of the defect, the proximity to free margins and the presence of a standing cone deformity a Burow's skin graft was deemed most appropriate. The standing cone was removed and this tissue was then trimmed to the shape of the primary defect. The adipose tissue was also removed until only dermis and epidermis were left.  The skin margins of the secondary defect were undermined to an appropriate distance in all directions utilizing iris scissors.  The secondary defect was closed with interrupted buried subcutaneous sutures.  The skin edges were then re-apposed with running  sutures.  The skin graft was then placed in the primary defect and oriented appropriately. Cartilage Graft Text: The defect edges were debeveled with a #15 scalpel blade.  Given the location of the defect, shape of the defect, the fact the defect involved a full thickness cartilage defect a cartilage graft was deemed most appropriate.  An appropriate donor site was identified, cleansed, and anesthetized. The cartilage graft was then harvested and transferred to the recipient site, oriented appropriately and then sutured into place.  The secondary defect was then repaired using a primary closure. Composite Graft Text: The defect edges were debeveled with a #15 scalpel blade.  Given the location of the defect, shape of the defect, the proximity to free margins and the fact the defect was full thickness a composite graft was deemed most appropriate.  The defect was outline and then transferred to the donor site.  A full thickness graft was then excised from the donor site. The graft was then placed in the primary defect, oriented appropriately and then sutured into place.  The secondary defect was then repaired using a primary closure. Epidermal Autograft Text: The defect edges were debeveled with a #15 scalpel blade.  Given the location of the defect, shape of the defect and the proximity to free margins an epidermal autograft was deemed most appropriate.  Using a sterile surgical marker, the primary defect shape was transferred to the donor site. The epidermal graft was then harvested.  The skin graft was then placed in the primary defect and oriented appropriately. Dermal Autograft Text: The defect edges were debeveled with a #15 scalpel blade.  Given the location of the defect, shape of the defect and the proximity to free margins a dermal autograft was deemed most appropriate.  Using a sterile surgical marker, the primary defect shape was transferred to the donor site. The area thus outlined was incised deep to adipose tissue with a #15 scalpel blade.  The harvested graft was then trimmed of adipose and epidermal tissue until only dermis was left.  The skin graft was then placed in the primary defect and oriented appropriately. Skin Substitute Text: The defect edges were debeveled with a #15 scalpel blade.  Given the location of the defect, shape of the defect and the proximity to free margins a skin substitute graft was deemed most appropriate.  The graft material was trimmed to fit the size of the defect. The graft was then placed in the primary defect and oriented appropriately. Tissue Cultured Epidermal Autograft Text: The defect edges were debeveled with a #15 scalpel blade.  Given the location of the defect, shape of the defect and the proximity to free margins a tissue cultured epidermal autograft was deemed most appropriate.  The graft was then trimmed to fit the size of the defect.  The graft was then placed in the primary defect and oriented appropriately. Xenograft Text: The defect edges were debeveled with a #15 scalpel blade.  Given the location of the defect, shape of the defect and the proximity to free margins a xenograft was deemed most appropriate.  The graft was then trimmed to fit the size of the defect.  The graft was then placed in the primary defect and oriented appropriately. Purse String (Intermediate) Text: Given the location of the defect and the characteristics of the surrounding skin a purse string intermediate closure was deemed most appropriate.  Undermining was performed circumferentially around the surgical defect.  A purse string suture was then placed and tightened. Purse String (Simple) Text: Given the location of the defect and the characteristics of the surrounding skin a purse string simple closure was deemed most appropriate.  Undermining was performed circumferentially around the surgical defect.  A purse string suture was then placed and tightened. Complex Repair And Single Advancement Flap Text: The defect edges were debeveled with a #15 scalpel blade.  The primary defect was closed partially with a complex linear closure.  Given the location of the remaining defect, shape of the defect and the proximity to free margins a single advancement flap was deemed most appropriate for complete closure of the defect.  Using a sterile surgical marker, an appropriate advancement flap was drawn incorporating the defect and placing the expected incisions within the relaxed skin tension lines where possible.    The area thus outlined was incised deep to adipose tissue with a #15 scalpel blade.  The skin margins were undermined to an appropriate distance in all directions utilizing iris scissors. Complex Repair And Double Advancement Flap Text: The defect edges were debeveled with a #15 scalpel blade.  The primary defect was closed partially with a complex linear closure.  Given the location of the remaining defect, shape of the defect and the proximity to free margins a double advancement flap was deemed most appropriate for complete closure of the defect.  Using a sterile surgical marker, an appropriate advancement flap was drawn incorporating the defect and placing the expected incisions within the relaxed skin tension lines where possible.    The area thus outlined was incised deep to adipose tissue with a #15 scalpel blade.  The skin margins were undermined to an appropriate distance in all directions utilizing iris scissors. Complex Repair And Modified Advancement Flap Text: The defect edges were debeveled with a #15 scalpel blade.  The primary defect was closed partially with a complex linear closure.  Given the location of the remaining defect, shape of the defect and the proximity to free margins a modified advancement flap was deemed most appropriate for complete closure of the defect.  Using a sterile surgical marker, an appropriate advancement flap was drawn incorporating the defect and placing the expected incisions within the relaxed skin tension lines where possible.    The area thus outlined was incised deep to adipose tissue with a #15 scalpel blade.  The skin margins were undermined to an appropriate distance in all directions utilizing iris scissors. Complex Repair And A-T Advancement Flap Text: The defect edges were debeveled with a #15 scalpel blade.  The primary defect was closed partially with a complex linear closure.  Given the location of the remaining defect, shape of the defect and the proximity to free margins an A-T advancement flap was deemed most appropriate for complete closure of the defect.  Using a sterile surgical marker, an appropriate advancement flap was drawn incorporating the defect and placing the expected incisions within the relaxed skin tension lines where possible.    The area thus outlined was incised deep to adipose tissue with a #15 scalpel blade.  The skin margins were undermined to an appropriate distance in all directions utilizing iris scissors. Complex Repair And O-T Advancement Flap Text: The defect edges were debeveled with a #15 scalpel blade.  The primary defect was closed partially with a complex linear closure.  Given the location of the remaining defect, shape of the defect and the proximity to free margins an O-T advancement flap was deemed most appropriate for complete closure of the defect.  Using a sterile surgical marker, an appropriate advancement flap was drawn incorporating the defect and placing the expected incisions within the relaxed skin tension lines where possible.    The area thus outlined was incised deep to adipose tissue with a #15 scalpel blade.  The skin margins were undermined to an appropriate distance in all directions utilizing iris scissors. Complex Repair And O-L Flap Text: The defect edges were debeveled with a #15 scalpel blade.  The primary defect was closed partially with a complex linear closure.  Given the location of the remaining defect, shape of the defect and the proximity to free margins an O-L flap was deemed most appropriate for complete closure of the defect.  Using a sterile surgical marker, an appropriate flap was drawn incorporating the defect and placing the expected incisions within the relaxed skin tension lines where possible.    The area thus outlined was incised deep to adipose tissue with a #15 scalpel blade.  The skin margins were undermined to an appropriate distance in all directions utilizing iris scissors. Complex Repair And Bilobe Flap Text: The defect edges were debeveled with a #15 scalpel blade.  The primary defect was closed partially with a complex linear closure.  Given the location of the remaining defect, shape of the defect and the proximity to free margins a bilobe flap was deemed most appropriate for complete closure of the defect.  Using a sterile surgical marker, an appropriate advancement flap was drawn incorporating the defect and placing the expected incisions within the relaxed skin tension lines where possible.    The area thus outlined was incised deep to adipose tissue with a #15 scalpel blade.  The skin margins were undermined to an appropriate distance in all directions utilizing iris scissors. Complex Repair And Melolabial Flap Text: The defect edges were debeveled with a #15 scalpel blade.  The primary defect was closed partially with a complex linear closure.  Given the location of the remaining defect, shape of the defect and the proximity to free margins a melolabial flap was deemed most appropriate for complete closure of the defect.  Using a sterile surgical marker, an appropriate advancement flap was drawn incorporating the defect and placing the expected incisions within the relaxed skin tension lines where possible.    The area thus outlined was incised deep to adipose tissue with a #15 scalpel blade.  The skin margins were undermined to an appropriate distance in all directions utilizing iris scissors. Complex Repair And Rotation Flap Text: The defect edges were debeveled with a #15 scalpel blade.  The primary defect was closed partially with a complex linear closure.  Given the location of the remaining defect, shape of the defect and the proximity to free margins a rotation flap was deemed most appropriate for complete closure of the defect.  Using a sterile surgical marker, an appropriate advancement flap was drawn incorporating the defect and placing the expected incisions within the relaxed skin tension lines where possible.    The area thus outlined was incised deep to adipose tissue with a #15 scalpel blade.  The skin margins were undermined to an appropriate distance in all directions utilizing iris scissors. Complex Repair And Rhombic Flap Text: The defect edges were debeveled with a #15 scalpel blade.  The primary defect was closed partially with a complex linear closure.  Given the location of the remaining defect, shape of the defect and the proximity to free margins a rhombic flap was deemed most appropriate for complete closure of the defect.  Using a sterile surgical marker, an appropriate advancement flap was drawn incorporating the defect and placing the expected incisions within the relaxed skin tension lines where possible.    The area thus outlined was incised deep to adipose tissue with a #15 scalpel blade.  The skin margins were undermined to an appropriate distance in all directions utilizing iris scissors. Complex Repair And Transposition Flap Text: The defect edges were debeveled with a #15 scalpel blade.  The primary defect was closed partially with a complex linear closure.  Given the location of the remaining defect, shape of the defect and the proximity to free margins a transposition flap was deemed most appropriate for complete closure of the defect.  Using a sterile surgical marker, an appropriate advancement flap was drawn incorporating the defect and placing the expected incisions within the relaxed skin tension lines where possible.    The area thus outlined was incised deep to adipose tissue with a #15 scalpel blade.  The skin margins were undermined to an appropriate distance in all directions utilizing iris scissors. Complex Repair And V-Y Plasty Text: The defect edges were debeveled with a #15 scalpel blade.  The primary defect was closed partially with a complex linear closure.  Given the location of the remaining defect, shape of the defect and the proximity to free margins a V-Y plasty was deemed most appropriate for complete closure of the defect.  Using a sterile surgical marker, an appropriate advancement flap was drawn incorporating the defect and placing the expected incisions within the relaxed skin tension lines where possible.    The area thus outlined was incised deep to adipose tissue with a #15 scalpel blade.  The skin margins were undermined to an appropriate distance in all directions utilizing iris scissors. Complex Repair And M Plasty Text: The defect edges were debeveled with a #15 scalpel blade.  The primary defect was closed partially with a complex linear closure.  Given the location of the remaining defect, shape of the defect and the proximity to free margins an M plasty was deemed most appropriate for complete closure of the defect.  Using a sterile surgical marker, an appropriate advancement flap was drawn incorporating the defect and placing the expected incisions within the relaxed skin tension lines where possible.    The area thus outlined was incised deep to adipose tissue with a #15 scalpel blade.  The skin margins were undermined to an appropriate distance in all directions utilizing iris scissors. Complex Repair And Double M Plasty Text: The defect edges were debeveled with a #15 scalpel blade.  The primary defect was closed partially with a complex linear closure.  Given the location of the remaining defect, shape of the defect and the proximity to free margins a double M plasty was deemed most appropriate for complete closure of the defect.  Using a sterile surgical marker, an appropriate advancement flap was drawn incorporating the defect and placing the expected incisions within the relaxed skin tension lines where possible.    The area thus outlined was incised deep to adipose tissue with a #15 scalpel blade.  The skin margins were undermined to an appropriate distance in all directions utilizing iris scissors. Complex Repair And W Plasty Text: The defect edges were debeveled with a #15 scalpel blade.  The primary defect was closed partially with a complex linear closure.  Given the location of the remaining defect, shape of the defect and the proximity to free margins a W plasty was deemed most appropriate for complete closure of the defect.  Using a sterile surgical marker, an appropriate advancement flap was drawn incorporating the defect and placing the expected incisions within the relaxed skin tension lines where possible.    The area thus outlined was incised deep to adipose tissue with a #15 scalpel blade.  The skin margins were undermined to an appropriate distance in all directions utilizing iris scissors. Complex Repair And Z Plasty Text: The defect edges were debeveled with a #15 scalpel blade.  The primary defect was closed partially with a complex linear closure.  Given the location of the remaining defect, shape of the defect and the proximity to free margins a Z plasty was deemed most appropriate for complete closure of the defect.  Using a sterile surgical marker, an appropriate advancement flap was drawn incorporating the defect and placing the expected incisions within the relaxed skin tension lines where possible.    The area thus outlined was incised deep to adipose tissue with a #15 scalpel blade.  The skin margins were undermined to an appropriate distance in all directions utilizing iris scissors. Complex Repair And Dorsal Nasal Flap Text: The defect edges were debeveled with a #15 scalpel blade.  The primary defect was closed partially with a complex linear closure.  Given the location of the remaining defect, shape of the defect and the proximity to free margins a dorsal nasal flap was deemed most appropriate for complete closure of the defect.  Using a sterile surgical marker, an appropriate flap was drawn incorporating the defect and placing the expected incisions within the relaxed skin tension lines where possible.    The area thus outlined was incised deep to adipose tissue with a #15 scalpel blade.  The skin margins were undermined to an appropriate distance in all directions utilizing iris scissors. Complex Repair And Ftsg Text: The defect edges were debeveled with a #15 scalpel blade.  The primary defect was closed partially with a complex linear closure.  Given the location of the defect, shape of the defect and the proximity to free margins a full thickness skin graft was deemed most appropriate to repair the remaining defect.  The graft was trimmed to fit the size of the remaining defect.  The graft was then placed in the primary defect, oriented appropriately, and sutured into place. Complex Repair And Burow's Graft Text: The defect edges were debeveled with a #15 scalpel blade.  The primary defect was closed partially with a complex linear closure.  Given the location of the defect, shape of the defect, the proximity to free margins and the presence of a standing cone deformity a Burow's graft was deemed most appropriate to repair the remaining defect.  The graft was trimmed to fit the size of the remaining defect.  The graft was then placed in the primary defect, oriented appropriately, and sutured into place. Complex Repair And Split-Thickness Skin Graft Text: The defect edges were debeveled with a #15 scalpel blade.  The primary defect was closed partially with a complex linear closure.  Given the location of the defect, shape of the defect and the proximity to free margins a split thickness skin graft was deemed most appropriate to repair the remaining defect.  The graft was trimmed to fit the size of the remaining defect.  The graft was then placed in the primary defect, oriented appropriately, and sutured into place. Complex Repair And Epidermal Autograft Text: The defect edges were debeveled with a #15 scalpel blade.  The primary defect was closed partially with a complex linear closure.  Given the location of the defect, shape of the defect and the proximity to free margins an epidermal autograft was deemed most appropriate to repair the remaining defect.  The graft was trimmed to fit the size of the remaining defect.  The graft was then placed in the primary defect, oriented appropriately, and sutured into place. Complex Repair And Dermal Autograft Text: The defect edges were debeveled with a #15 scalpel blade.  The primary defect was closed partially with a complex linear closure.  Given the location of the defect, shape of the defect and the proximity to free margins an dermal autograft was deemed most appropriate to repair the remaining defect.  The graft was trimmed to fit the size of the remaining defect.  The graft was then placed in the primary defect, oriented appropriately, and sutured into place. Complex Repair And Tissue Cultured Epidermal Autograft Text: The defect edges were debeveled with a #15 scalpel blade.  The primary defect was closed partially with a complex linear closure.  Given the location of the defect, shape of the defect and the proximity to free margins an tissue cultured epidermal autograft was deemed most appropriate to repair the remaining defect.  The graft was trimmed to fit the size of the remaining defect.  The graft was then placed in the primary defect, oriented appropriately, and sutured into place. Complex Repair And Xenograft Text: The defect edges were debeveled with a #15 scalpel blade.  The primary defect was closed partially with a complex linear closure.  Given the location of the defect, shape of the defect and the proximity to free margins a xenograft was deemed most appropriate to repair the remaining defect.  The graft was trimmed to fit the size of the remaining defect.  The graft was then placed in the primary defect, oriented appropriately, and sutured into place. Complex Repair And Skin Substitute Graft Text: The defect edges were debeveled with a #15 scalpel blade.  The primary defect was closed partially with a complex linear closure.  Given the location of the remaining defect, shape of the defect and the proximity to free margins a skin substitute graft was deemed most appropriate to repair the remaining defect.  The graft was trimmed to fit the size of the remaining defect.  The graft was then placed in the primary defect, oriented appropriately, and sutured into place. Path Notes (To The Dermatopathologist): Please check margins. Consent was obtained from the patient. The risks and benefits to therapy were discussed in detail. Specifically, the risks of infection, scarring, bleeding, prolonged wound healing, incomplete removal, allergy to anesthesia, nerve injury and recurrence were addressed. Prior to the procedure, the treatment site was clearly identified and confirmed by the patient. All components of Universal Protocol/PAUSE Rule completed. Post-Care Instructions: I reviewed with the patient in detail post-care instructions. Patient is not to engage in any heavy lifting, exercise, or swimming for the next 14 days. Should the patient develop any fevers, chills, bleeding, severe pain patient will contact the office immediately. Home Suture Removal Text: Patient was provided a home suture removal kit and will remove their sutures at home.  If they have any questions or difficulties they will call the office. Where Do You Want The Question To Include Opioid Counseling Located?: Case Summary Tab Billing Type: Third-Party Bill Information: Selecting Yes will display possible errors in your note based on the variables you have selected. This validation is only offered as a suggestion for you. PLEASE NOTE THAT THE VALIDATION TEXT WILL BE REMOVED WHEN YOU FINALIZE YOUR NOTE. IF YOU WANT TO FAX A PRELIMINARY NOTE YOU WILL NEED TO TOGGLE THIS TO 'NO' IF YOU DO NOT WANT IT IN YOUR FAXED NOTE.

## 2021-03-08 NOTE — ADDENDUM
[FreeTextEntry1] : Please Note:\par \par Mr. Dey is carrying out home glucose testing four times per day and is injecting insulin 2 x per day

## 2021-03-08 NOTE — HISTORY OF PRESENT ILLNESS
[FreeTextEntry1] : Mr. Dey is a 75 year old male who  returns today in follow up with regard to a history of type 2 diabetes mellitus. \par  There is no known history of retinopathy or nephropathy. He too denies any history of neuropathy. on losartan and amlodipine 10mg Current dm medications include Januvia  50 mg per day one tablet daily, Jardiance 10 mg po daily,  levemir  about 11 units am and 14 hs. and 15-16 hs units am and not using much novolog at all. Bg's low 100's am pre lunch similar, pre dinner  and mid 100's hsUsed novolog kaylyn \par . He denies any chest pain, sob, neurologic or ophthalmologic complaints. He too denies any new podiatric concerns. He is up to date with his ophthalmologic visit. Additional diagnose include: hyperlipidemia Does have mild hypercalcemia which may be Non PTH mediated. Recent labs with calcium normal and pth low end at 10.\par Does follow with nephrologist Dr. Snowden-latest Creatinine at 1.39\par On Tamsulosin and now Bethanecol per urol Dr. Tate\par D3 3,000 iu daily\par

## 2021-03-10 ENCOUNTER — NON-APPOINTMENT (OUTPATIENT)
Age: 76
End: 2021-03-10

## 2021-03-24 ENCOUNTER — APPOINTMENT (OUTPATIENT)
Dept: ENDOCRINOLOGY | Facility: CLINIC | Age: 76
End: 2021-03-24
Payer: MEDICARE

## 2021-03-24 VITALS
HEART RATE: 42 BPM | SYSTOLIC BLOOD PRESSURE: 110 MMHG | TEMPERATURE: 98.5 F | BODY MASS INDEX: 24.5 KG/M2 | HEIGHT: 71 IN | WEIGHT: 175 LBS | DIASTOLIC BLOOD PRESSURE: 80 MMHG | OXYGEN SATURATION: 99 %

## 2021-03-24 PROCEDURE — 99214 OFFICE O/P EST MOD 30 MIN: CPT | Mod: 25

## 2021-03-24 PROCEDURE — 82962 GLUCOSE BLOOD TEST: CPT

## 2021-03-24 PROCEDURE — 36415 COLL VENOUS BLD VENIPUNCTURE: CPT

## 2021-03-24 PROCEDURE — 83036 HEMOGLOBIN GLYCOSYLATED A1C: CPT | Mod: QW

## 2021-03-26 NOTE — H&P PST ADULT - VASCULAR
If you are a smoker, it is important for your health to stop smoking. Please be aware that second hand smoke is also harmful. Equal and normal pulses (carotid, femoral, dorsalis pedis)

## 2021-04-12 ENCOUNTER — APPOINTMENT (OUTPATIENT)
Dept: UROLOGY | Facility: CLINIC | Age: 76
End: 2021-04-12
Payer: MEDICARE

## 2021-04-12 VITALS
DIASTOLIC BLOOD PRESSURE: 48 MMHG | TEMPERATURE: 96 F | OXYGEN SATURATION: 100 % | WEIGHT: 170 LBS | HEIGHT: 71 IN | SYSTOLIC BLOOD PRESSURE: 134 MMHG | BODY MASS INDEX: 23.8 KG/M2 | HEART RATE: 42 BPM

## 2021-04-12 PROCEDURE — 51741 ELECTRO-UROFLOWMETRY FIRST: CPT

## 2021-04-12 PROCEDURE — 99213 OFFICE O/P EST LOW 20 MIN: CPT | Mod: 25

## 2021-04-12 PROCEDURE — 51798 US URINE CAPACITY MEASURE: CPT | Mod: 59

## 2021-04-12 NOTE — END OF VISIT
[FreeTextEntry3] : Aside from having surgery I recommended that he go back and bethanechol and he will do this.  His follow-up in 6 months or as needed

## 2021-04-12 NOTE — HISTORY OF PRESENT ILLNESS
[FreeTextEntry1] : This patient is here for evaluation of his voiding issues.  His residual urine was 300 cc but came down to almost nothing after combination of bethanechol and tamsulosin.  He has stopped taking the bethanechol and does feel a difference.  His urinary flow study today does show a somewhat obstructive curve and the residual urine is 100 cc.

## 2021-05-10 ENCOUNTER — NON-APPOINTMENT (OUTPATIENT)
Age: 76
End: 2021-05-10

## 2021-05-10 ENCOUNTER — APPOINTMENT (OUTPATIENT)
Dept: CARDIOLOGY | Facility: CLINIC | Age: 76
End: 2021-05-10
Payer: MEDICARE

## 2021-05-10 VITALS
OXYGEN SATURATION: 100 % | WEIGHT: 168 LBS | DIASTOLIC BLOOD PRESSURE: 63 MMHG | BODY MASS INDEX: 23.52 KG/M2 | HEIGHT: 71 IN | HEART RATE: 43 BPM | SYSTOLIC BLOOD PRESSURE: 117 MMHG

## 2021-05-10 PROCEDURE — 93000 ELECTROCARDIOGRAM COMPLETE: CPT

## 2021-05-10 PROCEDURE — 99214 OFFICE O/P EST MOD 30 MIN: CPT

## 2021-05-10 NOTE — ASSESSMENT
[FreeTextEntry1] : 75 year old male with above hx\par \par intentional weight loss with diet ,exercise improved DM \par \par controlled hypertension with hypertensive heart disease with abnormal EKG , continue  norvasc 10 mg po daily  \par  home BP reading ,   will obtain echo , exercise stress test to assess heart rate response \par \par Sinus bradycardia chronic asymptomatic ,  had normal chronotropic response  in oct  2019 continue to monitor \par \par Dyslipidemia  low HDl  continue medication \par \par CKD  Mild , stable   . \par \par DM    controlled  HB A1c 6.1  strict DM \par \par follow up after 4 months

## 2021-05-10 NOTE — REASON FOR VISIT
[Hyperlipidemia] : hyperlipidemia [Hypertension] : hypertension [Arrhythmia/ECG Abnorrmalities] : arrhythmia/ECG abnormalities [Other: ____] : [unfilled]

## 2021-05-10 NOTE — CARDIOLOGY SUMMARY
[de-identified] : October 1, 2019 equivocal EKG changes resolved quickly with normal myocardial perfusion scan [de-identified] :  marked sinus bradycardia 40s  [de-identified] : September 30, 2019:  mild aortic sclerosis, mild to moderate mitral regurgitation and tricuspid regurgitation. Estimated right ventricular systolic pressure 37 mm hg.

## 2021-05-10 NOTE — HISTORY OF PRESENT ILLNESS
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 patient does not  drink water   his follow up creatinine 1.39  glucose 181 \par \par \par \par His blood work in hospital   creatinine 1.48\par \par his blood work  TC 94  LDL 36  HDL 31  TG 46   CT angio no acute PE   negative venous doppler

## 2021-05-10 NOTE — PHYSICAL EXAM
[Normal S1, S2] : normal S1, S2 [Murmur] : murmur [No Murmur] : no murmur [Soft] : abdomen soft [Non Tender] : non-tender [Normal] : no rash, no skin lesions [de-identified] : 2/6 ESM

## 2021-06-09 NOTE — ADDENDUM
[FreeTextEntry1] : Please note:\par Mr Dey is taking insulin injections 4-5 times per day and is testing his glucose by finger stick at lest 4 times per day.

## 2021-06-09 NOTE — HISTORY OF PRESENT ILLNESS
[FreeTextEntry1] : Mr. Dey is a 76 year old male who  returns today in follow up with regard to a history of type 2 diabetes mellitus. \par  There is no known history of retinopathy or nephropathy. He too denies any history of neuropathy. on losartan and amlodipine 10mg Current dm medications include Januvia  50 mg per day one tablet daily, Jardiance 10 mg po daily,  levemir  about10-15 units aand am 11-16 units hs  hs and novolog 5-7 units at most meals. Taking 4-5 injections per day. HGM has shown values  am 130-160 range  PL  , -150 and HS at 130-140 range. A1c today at 5.9%\par . He denies any chest pain, sob, neurologic or ophthalmologic complaints. He too denies any new podiatric concerns. He is up to date with his ophthalmologic visit. Additional diagnose include: hyperlipidemia Does have mild hypercalcemia which may be Non PTH mediated. Recent labs with calcium normal and pth low end at 10.\par Does follow with nephrologist Dr. Snowden-latest Creatinine at 1.39 from this February.\par On Tamsulosin and now Bethanecol per urol Dr. Tate\par D3 3,000 iu daily\par Had covid vaccine

## 2021-06-15 ENCOUNTER — APPOINTMENT (OUTPATIENT)
Dept: CARDIOLOGY | Facility: CLINIC | Age: 76
End: 2021-06-15
Payer: MEDICARE

## 2021-06-15 PROCEDURE — 93306 TTE W/DOPPLER COMPLETE: CPT

## 2021-06-18 ENCOUNTER — APPOINTMENT (OUTPATIENT)
Dept: CARDIOLOGY | Facility: CLINIC | Age: 76
End: 2021-06-18
Payer: MEDICARE

## 2021-06-18 PROCEDURE — 93015 CV STRESS TEST SUPVJ I&R: CPT

## 2021-06-22 LAB
25(OH)D3 SERPL-MCNC: 55.8 NG/ML
25(OH)D3 SERPL-MCNC: 70 NG/ML
ALBUMIN SERPL ELPH-MCNC: 4.1 G/DL
ALBUMIN SERPL ELPH-MCNC: 4.1 G/DL
ALP BLD-CCNC: 47 U/L
ALP BLD-CCNC: 57 U/L
ALT SERPL-CCNC: 15 U/L
ALT SERPL-CCNC: 16 U/L
ANION GAP SERPL CALC-SCNC: 10 MMOL/L
ANION GAP SERPL CALC-SCNC: 11 MMOL/L
AST SERPL-CCNC: 20 U/L
AST SERPL-CCNC: 21 U/L
BILIRUB SERPL-MCNC: 0.3 MG/DL
BILIRUB SERPL-MCNC: 0.4 MG/DL
BUN SERPL-MCNC: 28 MG/DL
BUN SERPL-MCNC: 34 MG/DL
CALCIUM SERPL-MCNC: 10.1 MG/DL
CHLORIDE SERPL-SCNC: 103 MMOL/L
CHLORIDE SERPL-SCNC: 106 MMOL/L
CHOLEST SERPL-MCNC: 94 MG/DL
CHOLEST SERPL-MCNC: 99 MG/DL
CO2 SERPL-SCNC: 25 MMOL/L
CO2 SERPL-SCNC: 26 MMOL/L
CREAT SERPL-MCNC: 1.39 MG/DL
CREAT SERPL-MCNC: 1.48 MG/DL
CREAT SPEC-SCNC: 49 MG/DL
CREAT SPEC-SCNC: 58 MG/DL
ESTIMATED AVERAGE GLUCOSE: 123 MG/DL
ESTIMATED AVERAGE GLUCOSE: 128 MG/DL
FRUCTOSAMINE SERPL-MCNC: 242 UMOL/L
FRUCTOSAMINE SERPL-MCNC: 256 UMOL/L
GLUCOSE BLDC GLUCOMTR-MCNC: 139
GLUCOSE BLDC GLUCOMTR-MCNC: 174
GLUCOSE SERPL-MCNC: 152 MG/DL
GLUCOSE SERPL-MCNC: 89 MG/DL
GLYCOMARK.: 1.5 UG/ML
GLYCOMARK.: 1.9 UG/ML
HBA1C MFR BLD HPLC: 5.6
HBA1C MFR BLD HPLC: 5.9
HBA1C MFR BLD HPLC: 5.9 %
HBA1C MFR BLD HPLC: 6.1 %
HDLC SERPL-MCNC: 41 MG/DL
HDLC SERPL-MCNC: 42 MG/DL
LDLC SERPL DIRECT ASSAY-MCNC: 36 MG/DL
LDLC SERPL DIRECT ASSAY-MCNC: 48 MG/DL
MICROALBUMIN 24H UR DL<=1MG/L-MCNC: <1.2 MG/DL
MICROALBUMIN 24H UR DL<=1MG/L-MCNC: <1.2 MG/DL
MICROALBUMIN/CREAT 24H UR-RTO: NORMAL MG/G
MICROALBUMIN/CREAT 24H UR-RTO: NORMAL MG/G
PARATHYROID HORMONE INTACT: 13 PG/ML
POTASSIUM SERPL-SCNC: 4.3 MMOL/L
POTASSIUM SERPL-SCNC: 4.6 MMOL/L
PROT SERPL-MCNC: 6.5 G/DL
PROT SERPL-MCNC: 6.9 G/DL
SODIUM SERPL-SCNC: 141 MMOL/L
SODIUM SERPL-SCNC: 141 MMOL/L
T3FREE SERPL-MCNC: 2.67 PG/ML
T3FREE SERPL-MCNC: 3.05 PG/ML
T4 FREE SERPL-MCNC: 1.4 NG/DL
T4 FREE SERPL-MCNC: 1.5 NG/DL
TRIGL SERPL-MCNC: 46 MG/DL
TRIGL SERPL-MCNC: 61 MG/DL
TSH SERPL-ACNC: 1.7 UIU/ML
TSH SERPL-ACNC: 1.77 UIU/ML

## 2021-06-29 ENCOUNTER — NON-APPOINTMENT (OUTPATIENT)
Age: 76
End: 2021-06-29

## 2021-07-08 NOTE — H&P ADULT - NSHPOUTPATIENTPROVIDERS_GEN_ALL_CORE
Cardio: Dr. Palla.   PCP: Dr. Be Principal Discharge DX:	Otitis media   Cardio: Dr. Palla.   PCP: Dr. Be  Endocr DR. Balbuena, [place]

## 2021-08-04 ENCOUNTER — NON-APPOINTMENT (OUTPATIENT)
Age: 76
End: 2021-08-04

## 2021-08-04 ENCOUNTER — APPOINTMENT (OUTPATIENT)
Dept: ENDOCRINOLOGY | Facility: CLINIC | Age: 76
End: 2021-08-04

## 2021-08-05 ENCOUNTER — APPOINTMENT (OUTPATIENT)
Dept: CARDIOLOGY | Facility: CLINIC | Age: 76
End: 2021-08-05
Payer: MEDICARE

## 2021-08-05 PROCEDURE — A9500: CPT

## 2021-08-05 PROCEDURE — 93015 CV STRESS TEST SUPVJ I&R: CPT

## 2021-08-05 PROCEDURE — 78452 HT MUSCLE IMAGE SPECT MULT: CPT

## 2021-08-16 ENCOUNTER — APPOINTMENT (OUTPATIENT)
Dept: ENDOCRINOLOGY | Facility: CLINIC | Age: 76
End: 2021-08-16

## 2021-08-16 ENCOUNTER — APPOINTMENT (OUTPATIENT)
Dept: CARDIOLOGY | Facility: CLINIC | Age: 76
End: 2021-08-16
Payer: MEDICARE

## 2021-08-16 PROCEDURE — 95250 CONT GLUC MNTR PHYS/QHP EQP: CPT

## 2021-09-07 DIAGNOSIS — E04.9 NONTOXIC GOITER, UNSPECIFIED: ICD-10-CM

## 2021-09-10 LAB
25(OH)D3 SERPL-MCNC: 59.3 NG/ML
ALBUMIN SERPL ELPH-MCNC: 4.2 G/DL
ALP BLD-CCNC: 52 U/L
ALT SERPL-CCNC: 18 U/L
ANION GAP SERPL CALC-SCNC: 15 MMOL/L
AST SERPL-CCNC: 15 U/L
BASOPHILS # BLD AUTO: 0.05 K/UL
BASOPHILS NFR BLD AUTO: 0.7 %
BILIRUB SERPL-MCNC: 0.4 MG/DL
BUN SERPL-MCNC: 34 MG/DL
CALCIUM SERPL-MCNC: 10.2 MG/DL
CHLORIDE SERPL-SCNC: 104 MMOL/L
CHOLEST SERPL-MCNC: 102 MG/DL
CK SERPL-CCNC: 122 U/L
CO2 SERPL-SCNC: 23 MMOL/L
CREAT SERPL-MCNC: 1.45 MG/DL
EOSINOPHIL # BLD AUTO: 0.31 K/UL
EOSINOPHIL NFR BLD AUTO: 4.3 %
ESTIMATED AVERAGE GLUCOSE: 148 MG/DL
GLUCOSE SERPL-MCNC: 148 MG/DL
HBA1C MFR BLD HPLC: 6.8 %
HCT VFR BLD CALC: 38.8 %
HDLC SERPL-MCNC: 40 MG/DL
HGB BLD-MCNC: 12.8 G/DL
IMM GRANULOCYTES NFR BLD AUTO: 0.3 %
LDLC SERPL CALC-MCNC: 42 MG/DL
LYMPHOCYTES # BLD AUTO: 1.54 K/UL
LYMPHOCYTES NFR BLD AUTO: 21.2 %
MAGNESIUM SERPL-MCNC: 2.4 MG/DL
MAN DIFF?: NORMAL
MCHC RBC-ENTMCNC: 31.5 PG
MCHC RBC-ENTMCNC: 33 GM/DL
MCV RBC AUTO: 95.6 FL
MONOCYTES # BLD AUTO: 0.73 K/UL
MONOCYTES NFR BLD AUTO: 10.1 %
NEUTROPHILS # BLD AUTO: 4.61 K/UL
NEUTROPHILS NFR BLD AUTO: 63.4 %
NONHDLC SERPL-MCNC: 63 MG/DL
PLATELET # BLD AUTO: 243 K/UL
POTASSIUM SERPL-SCNC: 4.5 MMOL/L
PROT SERPL-MCNC: 6.5 G/DL
RBC # BLD: 4.06 M/UL
RBC # FLD: 13.2 %
SODIUM SERPL-SCNC: 142 MMOL/L
TRIGL SERPL-MCNC: 106 MG/DL
TSH SERPL-ACNC: 1.41 UIU/ML
WBC # FLD AUTO: 7.26 K/UL

## 2021-09-13 ENCOUNTER — APPOINTMENT (OUTPATIENT)
Dept: CARDIOLOGY | Facility: CLINIC | Age: 76
End: 2021-09-13
Payer: MEDICARE

## 2021-09-13 ENCOUNTER — NON-APPOINTMENT (OUTPATIENT)
Age: 76
End: 2021-09-13

## 2021-09-13 VITALS
OXYGEN SATURATION: 97 % | SYSTOLIC BLOOD PRESSURE: 113 MMHG | WEIGHT: 187 LBS | HEART RATE: 85 BPM | HEIGHT: 71 IN | BODY MASS INDEX: 26.18 KG/M2 | DIASTOLIC BLOOD PRESSURE: 62 MMHG

## 2021-09-13 PROCEDURE — 93000 ELECTROCARDIOGRAM COMPLETE: CPT

## 2021-09-13 PROCEDURE — 99214 OFFICE O/P EST MOD 30 MIN: CPT

## 2021-09-13 NOTE — ASSESSMENT
[FreeTextEntry1] : 75 year old male with above hx\par \par intentional weight loss with diet ,exercise improved DM \par \par controlled hypertension with hypertensive heart disease with abnormal EKG , continue  norvasc 10 mg po daily  \par  home BP reading , \par \par Sinus bradycardia chronic asymptomatic ,  had normal chronotropic response 8/2021 stress test  continue to monitor \par \par Dyslipidemia  low HDl  continue medication \par \par CKD  Mild , stable   . \par \par DM    controlled  HB A1c 5.8   strict DM \par \par follow up after 4 months

## 2021-09-13 NOTE — REVIEW OF SYSTEMS
[Urinary Frequency] : urinary frequency [Negative] : Neurological [Joint Pain] : no joint pain [FreeTextEntry8] : BPH

## 2021-09-13 NOTE — HISTORY OF PRESENT ILLNESS
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 patient does not  drink water   his follow up creatinine 1.45  hemoglobin 12.8  had colonoscopy 3 years ago , \par \par his blood work     LDL 42   HDL 40  TG  106  CT angio no acute PE   negative venous doppler

## 2021-09-13 NOTE — PHYSICAL EXAM
[No Gallop] : no gallop [Normal Rate] : normal [Normal S1] : normal S1 [Normal S2] : normal S2 [II] : a grade 2 [No Pitting Edema] : no pitting edema present [2+] : left 2+ [No Abnormalities] : the abdominal aorta was not enlarged and no bruit was heard [Soft] : abdomen soft [Non Tender] : non-tender [Normal] : no rash, no skin lesions [S3] : no S3 [S4] : no S4 [Right Carotid Bruit] : no bruit heard over the right carotid [Left Carotid Bruit] : no bruit heard over the left carotid [Right Femoral Bruit] : no bruit heard over the right femoral artery [Left Femoral Bruit] : no bruit heard over the left femoral artery [Normal Radial B/L] : normal radial B/L [Normal PT B/L] : normal PT B/L [Normal DP B/L] : normal DP B/L

## 2021-09-13 NOTE — CARDIOLOGY SUMMARY
[de-identified] : 9/13/21  marked sinus bradycardia 40s firsrt degree AV block  [de-identified] : 8/5/21 equivocal EKG changes resolved quickly  12 METS , 88%MPHR inferior wall fixed defect with normal motion  diaphragmatic attenuation ,  no ischemia , EF 61%  [de-identified] : 6/15/21   EF 61% Mild LVH :  mild aortic sclerosis, mild  mitral regurgitation and tricuspid regurgitation. Estimated right ventricular systolic pressure 37 mm hg.

## 2021-09-30 ENCOUNTER — APPOINTMENT (OUTPATIENT)
Dept: ENDOCRINOLOGY | Facility: CLINIC | Age: 76
End: 2021-09-30
Payer: MEDICARE

## 2021-09-30 ENCOUNTER — RX RENEWAL (OUTPATIENT)
Age: 76
End: 2021-09-30

## 2021-09-30 VITALS
RESPIRATION RATE: 15 BRPM | OXYGEN SATURATION: 98 % | SYSTOLIC BLOOD PRESSURE: 148 MMHG | WEIGHT: 195 LBS | BODY MASS INDEX: 27.2 KG/M2 | TEMPERATURE: 98.6 F | DIASTOLIC BLOOD PRESSURE: 72 MMHG | HEART RATE: 46 BPM

## 2021-09-30 LAB — GLUCOSE BLDC GLUCOMTR-MCNC: 269

## 2021-09-30 PROCEDURE — 82962 GLUCOSE BLOOD TEST: CPT

## 2021-09-30 PROCEDURE — 83036 HEMOGLOBIN GLYCOSYLATED A1C: CPT | Mod: QW

## 2021-09-30 PROCEDURE — 99214 OFFICE O/P EST MOD 30 MIN: CPT | Mod: 25

## 2021-09-30 PROCEDURE — 95251 CONT GLUC MNTR ANALYSIS I&R: CPT

## 2021-09-30 NOTE — HISTORY OF PRESENT ILLNESS
[FreeTextEntry1] : Mr. Dey is a 76 year old male who  returns today in follow up with regard to a history of type 2 diabetes mellitus. \par  There is no known history of retinopathy or nephropathy. He too denies any history of neuropathy. on losartan and amlodipine 10mg Current dm medications include Januvia  50 mg per day one tablet daily, Jardiance 10 mg po daily,  Levemir  about 25-30 units and am 20-35 units hs  hs and novolog 5-7 units at most meals. Taking 4-5 injections per day. HGM has shown values   in the high 100s in the am with one value down to 119,  before lunch, 131-175 pre dinner, 101-161 hs. He denies any chest pain, sob, neurologic or ophthalmologic complaints. He too denies any new podiatric concerns. He is not up to date with his ophthalmologic visit.\par 9/9/2021 A1c returned at 6.8%\par POCT glucose returned today at  269  mg/dL, HGB 12.8, RBC was 4.06 (will be having colonoscopy)\par 9/9/2021 creatinine was 1.45\par  Additional diagnose include: hyperlipidemia Does have mild hypercalcemia which may be Non PTH mediated. Recent labs with calcium normal and pth low end at 10.\par Does follow with nephrologist Dr. Snowden-latest Creatinine at 1.39 from this February.\par On Tamsulosin and now Bethanecol per urol Dr. Tate\par D3 3,000 iu daily\par Had covid vaccine

## 2021-12-07 ENCOUNTER — APPOINTMENT (OUTPATIENT)
Dept: CARDIOLOGY | Facility: CLINIC | Age: 76
End: 2021-12-07

## 2022-01-12 LAB
25(OH)D3 SERPL-MCNC: 51.9 NG/ML
ALBUMIN SERPL ELPH-MCNC: 4.1 G/DL
ALP BLD-CCNC: 51 U/L
ALT SERPL-CCNC: 21 U/L
ANION GAP SERPL CALC-SCNC: 14 MMOL/L
AST SERPL-CCNC: 20 U/L
BASOPHILS # BLD AUTO: 0.04 K/UL
BASOPHILS NFR BLD AUTO: 0.4 %
BILIRUB SERPL-MCNC: 0.4 MG/DL
BUN SERPL-MCNC: 27 MG/DL
CA-I SERPL-SCNC: 1.27 MMOL/L
CALCIUM SERPL-MCNC: 9.7 MG/DL
CALCIUM SERPL-MCNC: 9.7 MG/DL
CHLORIDE SERPL-SCNC: 101 MMOL/L
CHOLEST SERPL-MCNC: 108 MG/DL
CO2 SERPL-SCNC: 24 MMOL/L
CREAT SERPL-MCNC: 1.53 MG/DL
CREAT SPEC-SCNC: 46 MG/DL
EOSINOPHIL # BLD AUTO: 0.34 K/UL
EOSINOPHIL NFR BLD AUTO: 3.4 %
ESTIMATED AVERAGE GLUCOSE: 157 MG/DL
FRUCTOSAMINE SERPL-MCNC: 270 UMOL/L
GLUCOSE SERPL-MCNC: 176 MG/DL
GLYCOMARK.: 3 UG/ML
HBA1C MFR BLD HPLC: 7.1 %
HCT VFR BLD CALC: 39.2 %
HDLC SERPL-MCNC: 31 MG/DL
HGB BLD-MCNC: 12.7 G/DL
IMM GRANULOCYTES NFR BLD AUTO: 0.4 %
LDLC SERPL CALC-MCNC: 50 MG/DL
LYMPHOCYTES # BLD AUTO: 1.33 K/UL
LYMPHOCYTES NFR BLD AUTO: 13.1 %
MAGNESIUM SERPL-MCNC: 2.3 MG/DL
MAN DIFF?: NORMAL
MCHC RBC-ENTMCNC: 30.2 PG
MCHC RBC-ENTMCNC: 32.4 GM/DL
MCV RBC AUTO: 93.3 FL
MICROALBUMIN 24H UR DL<=1MG/L-MCNC: <1.2 MG/DL
MICROALBUMIN/CREAT 24H UR-RTO: NORMAL MG/G
MONOCYTES # BLD AUTO: 0.71 K/UL
MONOCYTES NFR BLD AUTO: 7 %
NEUTROPHILS # BLD AUTO: 7.66 K/UL
NEUTROPHILS NFR BLD AUTO: 75.7 %
NONHDLC SERPL-MCNC: 77 MG/DL
PARATHYROID HORMONE INTACT: 12 PG/ML
PLATELET # BLD AUTO: 291 K/UL
POTASSIUM SERPL-SCNC: 4.3 MMOL/L
PROT SERPL-MCNC: 6.6 G/DL
RBC # BLD: 4.2 M/UL
RBC # FLD: 13 %
SODIUM SERPL-SCNC: 139 MMOL/L
T3FREE SERPL-MCNC: 2.77 PG/ML
T4 FREE SERPL-MCNC: 1.4 NG/DL
TRIGL SERPL-MCNC: 136 MG/DL
TSH SERPL-ACNC: 1.26 UIU/ML
WBC # FLD AUTO: 10.12 K/UL

## 2022-01-19 ENCOUNTER — APPOINTMENT (OUTPATIENT)
Dept: ENDOCRINOLOGY | Facility: CLINIC | Age: 77
End: 2022-01-19
Payer: MEDICARE

## 2022-01-19 VITALS
OXYGEN SATURATION: 98 % | HEIGHT: 71 IN | DIASTOLIC BLOOD PRESSURE: 72 MMHG | WEIGHT: 195 LBS | HEART RATE: 55 BPM | TEMPERATURE: 98 F | SYSTOLIC BLOOD PRESSURE: 130 MMHG | BODY MASS INDEX: 27.3 KG/M2

## 2022-01-19 PROCEDURE — 95251 CONT GLUC MNTR ANALYSIS I&R: CPT

## 2022-01-19 PROCEDURE — 99214 OFFICE O/P EST MOD 30 MIN: CPT | Mod: 25

## 2022-01-19 RX ORDER — GLUCOSAMINE HCL 500 MG
75 MCG TABLET ORAL DAILY
Refills: 0 | Status: DISCONTINUED | COMMUNITY
End: 2022-01-19

## 2022-01-19 NOTE — HISTORY OF PRESENT ILLNESS
[FreeTextEntry1] : Mr. Dey is a 76 year old male who  returns today in follow up with regard to a history of type 2 diabetes mellitus. There is no known history of retinopathy or nephropathy. He too denies any history of neuropathy. on losartan and amlodipine 10mg Current dm medications include Januvia  50 mg per day one tablet daily, Jardiance 10 mg po daily,  Levemir 25-30 units in the am and 30 units hs and novolog 15-35 units at most meals. Taking 4-5 injections per day. HGM has shown values mid 100s to low 200s in the am, mid 100 to mid 200s during lunch time, mid 100s to low 200s during snacktime, mid 100s - mid 200s during dinner time and mid to high 100s hs with a higher value at 280.      \par . He denies any chest pain, sob, neurologic or ophthalmologic complaints. He too denies any new podiatric concerns. He is up to date with his ophthalmologic visit. Additional diagnose include: hyperlipidemia Does have mild hypercalcemia which may be Non PTH mediated. Recent labs with calcium normal and pth low end at 10.\par Does follow with nephrologist Dr. Snowden-latest Creatinine at 1.53 from 1/11/2022\par Additioanl labs from 1/11/2022 shoed A1c at 7.1%. TFT's, Vit d 25-OH and cmp were otherwise wnl. Fructosamine returned at 270.\par On Tamsulosin and now Bethanecol per urol Dr. Tate\par D3 3,000 iu daily\par Had covid vaccine

## 2022-01-20 ENCOUNTER — NON-APPOINTMENT (OUTPATIENT)
Age: 77
End: 2022-01-20

## 2022-01-20 LAB
25(OH)D3 SERPL-MCNC: 52.9 NG/ML
ALBUMIN SERPL ELPH-MCNC: 4.3 G/DL
ALP BLD-CCNC: 65 U/L
ALT SERPL-CCNC: 30 U/L
ANION GAP SERPL CALC-SCNC: 15 MMOL/L
AST SERPL-CCNC: 24 U/L
BASOPHILS # BLD AUTO: 0.06 K/UL
BASOPHILS NFR BLD AUTO: 0.6 %
BILIRUB SERPL-MCNC: 0.4 MG/DL
BUN SERPL-MCNC: 29 MG/DL
CALCIUM SERPL-MCNC: 9.8 MG/DL
CHLORIDE SERPL-SCNC: 99 MMOL/L
CHOLEST SERPL-MCNC: 125 MG/DL
CO2 SERPL-SCNC: 26 MMOL/L
CREAT SERPL-MCNC: 1.75 MG/DL
EOSINOPHIL # BLD AUTO: 0.42 K/UL
EOSINOPHIL NFR BLD AUTO: 4.3 %
ESTIMATED AVERAGE GLUCOSE: 154 MG/DL
GLUCOSE SERPL-MCNC: 252 MG/DL
HBA1C MFR BLD HPLC: 7 %
HCT VFR BLD CALC: 40.7 %
HDLC SERPL-MCNC: 35 MG/DL
HGB BLD-MCNC: 13.1 G/DL
IMM GRANULOCYTES NFR BLD AUTO: 0.7 %
LDLC SERPL CALC-MCNC: 51 MG/DL
LYMPHOCYTES # BLD AUTO: 1.69 K/UL
LYMPHOCYTES NFR BLD AUTO: 17.5 %
MAGNESIUM SERPL-MCNC: 2.4 MG/DL
MAN DIFF?: NORMAL
MCHC RBC-ENTMCNC: 30.5 PG
MCHC RBC-ENTMCNC: 32.2 GM/DL
MCV RBC AUTO: 94.7 FL
MONOCYTES # BLD AUTO: 0.63 K/UL
MONOCYTES NFR BLD AUTO: 6.5 %
NEUTROPHILS # BLD AUTO: 6.79 K/UL
NEUTROPHILS NFR BLD AUTO: 70.4 %
NONHDLC SERPL-MCNC: 90 MG/DL
PLATELET # BLD AUTO: 327 K/UL
POTASSIUM SERPL-SCNC: 5.4 MMOL/L
PROT SERPL-MCNC: 6.9 G/DL
PSA SERPL-MCNC: 0.78 NG/ML
RBC # BLD: 4.3 M/UL
RBC # FLD: 13.2 %
SODIUM SERPL-SCNC: 140 MMOL/L
T3 SERPL-MCNC: 118 NG/DL
T4 SERPL-MCNC: 9.7 UG/DL
TRIGL SERPL-MCNC: 193 MG/DL
TSH SERPL-ACNC: 1.9 UIU/ML
WBC # FLD AUTO: 9.66 K/UL

## 2022-01-21 ENCOUNTER — OUTPATIENT (OUTPATIENT)
Dept: OUTPATIENT SERVICES | Facility: HOSPITAL | Age: 77
LOS: 1 days | End: 2022-01-21
Payer: MEDICARE

## 2022-01-21 ENCOUNTER — APPOINTMENT (OUTPATIENT)
Dept: CT IMAGING | Facility: CLINIC | Age: 77
End: 2022-01-21
Payer: MEDICARE

## 2022-01-21 DIAGNOSIS — Z90.89 ACQUIRED ABSENCE OF OTHER ORGANS: Chronic | ICD-10-CM

## 2022-01-21 DIAGNOSIS — Z98.890 OTHER SPECIFIED POSTPROCEDURAL STATES: Chronic | ICD-10-CM

## 2022-01-21 DIAGNOSIS — Z00.8 ENCOUNTER FOR OTHER GENERAL EXAMINATION: ICD-10-CM

## 2022-01-21 PROCEDURE — 74177 CT ABD & PELVIS W/CONTRAST: CPT | Mod: 26,MH

## 2022-01-21 PROCEDURE — 74177 CT ABD & PELVIS W/CONTRAST: CPT | Mod: MH

## 2022-01-21 PROCEDURE — 82565 ASSAY OF CREATININE: CPT

## 2022-01-24 ENCOUNTER — APPOINTMENT (OUTPATIENT)
Dept: CARDIOLOGY | Facility: CLINIC | Age: 77
End: 2022-01-24
Payer: MEDICARE

## 2022-01-24 ENCOUNTER — NON-APPOINTMENT (OUTPATIENT)
Age: 77
End: 2022-01-24

## 2022-01-24 VITALS
WEIGHT: 195 LBS | DIASTOLIC BLOOD PRESSURE: 63 MMHG | HEIGHT: 71 IN | HEART RATE: 50 BPM | SYSTOLIC BLOOD PRESSURE: 116 MMHG | BODY MASS INDEX: 27.3 KG/M2 | OXYGEN SATURATION: 99 %

## 2022-01-24 PROCEDURE — 99214 OFFICE O/P EST MOD 30 MIN: CPT

## 2022-01-24 PROCEDURE — 93000 ELECTROCARDIOGRAM COMPLETE: CPT

## 2022-01-24 NOTE — ASSESSMENT
[FreeTextEntry1] : 76 year old male with above hx\par \par \par controlled hypertension with hypertensive heart disease with abnormal EKG , continue  norvasc 10 mg po daily  \par  home BP reading , \par \par Sinus bradycardia chronic asymptomatic ,  had normal chronotropic response 8/2021 stress test  continue to monitor \par \par Dyslipidemia  low HDl  continue medication \par \par CKD  : mild worsened creatinine , elevated K level compared to one week prior blood work ? hemolyzed or dehydration , encourage po fluid intake  . \par \par DM   not well  controlled  HB A1c 7.0  strict diet  exercise , \par \par follow up after 4 months

## 2022-01-24 NOTE — CARDIOLOGY SUMMARY
[de-identified] : 1/24/22   marked sinus bradycardia 40s firsrt degree AV block  non specific ST changes no signficant change from prior  [de-identified] : 8/5/21 equivocal EKG changes resolved quickly  12 METS , 88%MPHR inferior wall fixed defect with normal motion  diaphragmatic attenuation ,  no ischemia , EF 61%  [de-identified] : 6/15/21   EF 61% Mild LVH :  mild aortic sclerosis, mild  mitral regurgitation and tricuspid regurgitation. Estimated right ventricular systolic pressure 37 mm hg.

## 2022-01-24 NOTE — HISTORY OF PRESENT ILLNESS
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 patient does not  drink water   his follow up creatinine elevated   hemoglobin 13.1  had colonoscopy 3 years ago and last week , was told to have benign polyps \par \par his blood work     LDL 51   HDL 35      renal function showed mild elevated k worsened creatine than prior weeks blood work  , \par \par \par \par \par \par CT angio no acute PE   negative venous doppler

## 2022-02-02 LAB
ANION GAP SERPL CALC-SCNC: 12 MMOL/L
BUN SERPL-MCNC: 30 MG/DL
CALCIUM SERPL-MCNC: 9.8 MG/DL
CHLORIDE SERPL-SCNC: 103 MMOL/L
CO2 SERPL-SCNC: 26 MMOL/L
CREAT SERPL-MCNC: 1.76 MG/DL
GLUCOSE SERPL-MCNC: 138 MG/DL
POTASSIUM SERPL-SCNC: 4.5 MMOL/L
SODIUM SERPL-SCNC: 140 MMOL/L

## 2022-02-03 ENCOUNTER — OUTPATIENT (OUTPATIENT)
Dept: OUTPATIENT SERVICES | Facility: HOSPITAL | Age: 77
LOS: 1 days | End: 2022-02-03
Payer: MEDICARE

## 2022-02-03 ENCOUNTER — APPOINTMENT (OUTPATIENT)
Dept: ULTRASOUND IMAGING | Facility: CLINIC | Age: 77
End: 2022-02-03
Payer: MEDICARE

## 2022-02-03 DIAGNOSIS — Z00.00 ENCOUNTER FOR GENERAL ADULT MEDICAL EXAMINATION WITHOUT ABNORMAL FINDINGS: ICD-10-CM

## 2022-02-03 DIAGNOSIS — Z98.890 OTHER SPECIFIED POSTPROCEDURAL STATES: Chronic | ICD-10-CM

## 2022-02-03 DIAGNOSIS — Z90.89 ACQUIRED ABSENCE OF OTHER ORGANS: Chronic | ICD-10-CM

## 2022-02-03 PROCEDURE — 76981 USE PARENCHYMA: CPT | Mod: XU

## 2022-02-03 PROCEDURE — 76700 US EXAM ABDOM COMPLETE: CPT | Mod: 26

## 2022-02-03 PROCEDURE — 76981 USE PARENCHYMA: CPT | Mod: 26,59

## 2022-02-03 PROCEDURE — 76700 US EXAM ABDOM COMPLETE: CPT

## 2022-02-07 DIAGNOSIS — Z87.898 PERSONAL HISTORY OF OTHER SPECIFIED CONDITIONS: ICD-10-CM

## 2022-02-07 DIAGNOSIS — Z86.39 PERSONAL HISTORY OF OTHER ENDOCRINE, NUTRITIONAL AND METABOLIC DISEASE: ICD-10-CM

## 2022-02-07 DIAGNOSIS — Z23 ENCOUNTER FOR IMMUNIZATION: ICD-10-CM

## 2022-02-07 DIAGNOSIS — Z87.438 PERSONAL HISTORY OF OTHER DISEASES OF MALE GENITAL ORGANS: ICD-10-CM

## 2022-02-07 DIAGNOSIS — L03.115 CELLULITIS OF RIGHT LOWER LIMB: ICD-10-CM

## 2022-02-07 DIAGNOSIS — Z87.440 PERSONAL HISTORY OF URINARY (TRACT) INFECTIONS: ICD-10-CM

## 2022-03-20 ENCOUNTER — OUTPATIENT (OUTPATIENT)
Dept: OUTPATIENT SERVICES | Facility: HOSPITAL | Age: 77
LOS: 1 days | Discharge: HOME | End: 2022-03-20

## 2022-03-20 DIAGNOSIS — Z98.890 OTHER SPECIFIED POSTPROCEDURAL STATES: Chronic | ICD-10-CM

## 2022-03-20 DIAGNOSIS — D73.9 DISEASE OF SPLEEN, UNSPECIFIED: ICD-10-CM

## 2022-03-20 DIAGNOSIS — Z90.89 ACQUIRED ABSENCE OF OTHER ORGANS: Chronic | ICD-10-CM

## 2022-03-21 ENCOUNTER — APPOINTMENT (OUTPATIENT)
Dept: HEMATOLOGY ONCOLOGY | Facility: CLINIC | Age: 77
End: 2022-03-21
Payer: MEDICARE

## 2022-03-21 DIAGNOSIS — R16.1 SPLENOMEGALY, NOT ELSEWHERE CLASSIFIED: ICD-10-CM

## 2022-03-21 PROCEDURE — 99203 OFFICE O/P NEW LOW 30 MIN: CPT

## 2022-03-21 NOTE — HISTORY OF PRESENT ILLNESS
[de-identified] : 78 y/o male referred for borderline  splenomegaly incidentally noted on CT.\par Routine colonoscopy 1/12/22 : few benign polyps, dilated veins in the rectum of unclear significance. CT abd and pelvis with contrast  1/21/22 : rectal varices similar compared to 2018; no WINIFRED, mild hepatomegaly; spleen enlarged 16.6 cm. \par US elastography/ US abdomen 2/3/22  liver normal in size and echogenicity; mild splenomegaly 13.8 cm .\par \par Feels well. Active. Lost 40 lbs intentionally two years ago ( on calorie restricted diet) and now gained it all back. No GI complaints. Occasional lower left side discomfort when he is on his left side in bed- comes and goes - same for several years.\par No infections. No fevers, no night  sweats. Energy is good. Active.  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

## 2022-03-21 NOTE — ASSESSMENT
[FreeTextEntry1] : 76 y/o male with borderline splenomegaly incidentally noted on CT scan of abdomen.\par No known acute or chronic medical conditions associated with splenomegaly.\par No evidence of thrombosis in portal / splenic veins. No acute infections. \par Entirely normal CBC/ differential.\par Do not suspect hemolytic process. No hx of hemoglobinopathy.\par Do not suspect myeloproliferative disorder ( Ht/ WBC/ plts not elevated).\par Hx of limited stage low grade NHL in 2003 s/p definitive RT. \par Cannot entirely rule out splenic involvement with lymphoma- but suspicion is low. has no WINIFRED, normal CBC. Not symptomatic. Would monitor for now.\par \par Splenomegaly is only borderline- could be his normal range. Spleen size up to 14.5 cm reported in some  healthy male patients.\par Would hold off with further w/up at this point.\par Monitor - yearly US (  early 2023). he h=gets periodic blood work  including CBC with his endocrinologist and cardiologist.\par Return visit  in one year ( after US)/ sooner PRN. \par

## 2022-03-21 NOTE — CONSULT LETTER
[Dear  ___] : Dear ~INEZ, [( Thank you for referring [unfilled] for consultation for _____ )] : Thank you for referring [unfilled] for consultation for [unfilled] [Please see my note below.] : Please see my note below. [Consult Closing:] : Thank you very much for allowing me to participate in the care of this patient.  If you have any questions, please do not hesitate to contact me. [Sincerely,] : Sincerely, [FreeTextEntry2] : Dr Derrick Simmons [FreeTextEntry3] : Liana Casper

## 2022-03-21 NOTE — PHYSICAL EXAM
[Fully active, able to carry on all pre-disease performance without restriction] : Status 0 - Fully active, able to carry on all pre-disease performance without restriction [Normal] : affect appropriate [de-identified] : looks well  [de-identified] : obese soft NT no overt palpable masses

## 2022-03-28 RX ORDER — BLOOD SUGAR DIAGNOSTIC
STRIP MISCELLANEOUS
Qty: 3 | Refills: 3 | Status: ACTIVE | COMMUNITY
Start: 2021-03-24 | End: 1900-01-01

## 2022-04-25 ENCOUNTER — RX CHANGE (OUTPATIENT)
Age: 77
End: 2022-04-25

## 2022-04-25 RX ORDER — BETHANECHOL CHLORIDE 50 MG/1
50 TABLET ORAL
Qty: 60 | Refills: 0 | Status: DISCONTINUED | COMMUNITY
Start: 2021-01-04 | End: 2022-04-25

## 2022-05-03 ENCOUNTER — NON-APPOINTMENT (OUTPATIENT)
Age: 77
End: 2022-05-03

## 2022-05-03 DIAGNOSIS — E78.1 PURE HYPERGLYCERIDEMIA: ICD-10-CM

## 2022-05-03 RX ORDER — SILDENAFIL 20 MG/1
20 TABLET ORAL
Qty: 90 | Refills: 2 | Status: COMPLETED | COMMUNITY
Start: 2018-12-07 | End: 2022-05-03

## 2022-05-03 RX ORDER — FENOFIBRATE 130 MG/1
130 CAPSULE ORAL
Qty: 90 | Refills: 1 | Status: DISCONTINUED | COMMUNITY
Start: 1900-01-01 | End: 2022-05-03

## 2022-05-03 RX ORDER — TERAZOSIN 5 MG/1
5 CAPSULE ORAL
Qty: 7 | Refills: 0 | Status: DISCONTINUED | OUTPATIENT
Start: 2020-12-15 | End: 2022-05-03

## 2022-05-18 ENCOUNTER — APPOINTMENT (OUTPATIENT)
Dept: ENDOCRINOLOGY | Facility: CLINIC | Age: 77
End: 2022-05-18

## 2022-05-31 ENCOUNTER — NON-APPOINTMENT (OUTPATIENT)
Age: 77
End: 2022-05-31

## 2022-05-31 ENCOUNTER — APPOINTMENT (OUTPATIENT)
Dept: UROLOGY | Facility: CLINIC | Age: 77
End: 2022-05-31
Payer: MEDICARE

## 2022-05-31 VITALS
WEIGHT: 203 LBS | SYSTOLIC BLOOD PRESSURE: 129 MMHG | HEIGHT: 71 IN | OXYGEN SATURATION: 98 % | DIASTOLIC BLOOD PRESSURE: 55 MMHG | HEART RATE: 66 BPM | BODY MASS INDEX: 28.42 KG/M2

## 2022-05-31 DIAGNOSIS — Z00.00 ENCOUNTER FOR GENERAL ADULT MEDICAL EXAMINATION W/OUT ABNORMAL FINDINGS: ICD-10-CM

## 2022-05-31 PROCEDURE — 99213 OFFICE O/P EST LOW 20 MIN: CPT

## 2022-05-31 NOTE — HISTORY OF PRESENT ILLNESS
[FreeTextEntry1] : 77M presents today for a checkup. Pt is doing well from a urinary standpoint. He denies any symptoms. He needs his labs done as well.

## 2022-06-01 LAB
APPEARANCE: CLEAR
BACTERIA: NEGATIVE
BILIRUBIN URINE: NEGATIVE
BLOOD URINE: NEGATIVE
COLOR: NORMAL
GLUCOSE QUALITATIVE U: ABNORMAL
HYALINE CASTS: 0 /LPF
KETONES URINE: NEGATIVE
LEUKOCYTE ESTERASE URINE: ABNORMAL
MICROSCOPIC-UA: NORMAL
NITRITE URINE: NEGATIVE
PH URINE: 5.5
PROTEIN URINE: NEGATIVE
PSA SERPL-MCNC: 0.79 NG/ML
RED BLOOD CELLS URINE: 1 /HPF
SPECIFIC GRAVITY URINE: 1.03
SQUAMOUS EPITHELIAL CELLS: 0 /HPF
URINE CYTOLOGY: NORMAL
UROBILINOGEN URINE: NORMAL
WHITE BLOOD CELLS URINE: 4 /HPF

## 2022-06-02 ENCOUNTER — LABORATORY RESULT (OUTPATIENT)
Age: 77
End: 2022-06-02

## 2022-06-02 ENCOUNTER — NON-APPOINTMENT (OUTPATIENT)
Age: 77
End: 2022-06-02

## 2022-06-03 LAB
ALBUMIN SERPL ELPH-MCNC: 4.4 G/DL
ALP BLD-CCNC: 59 U/L
ALT SERPL-CCNC: 15 U/L
ANION GAP SERPL CALC-SCNC: 22 MMOL/L
AST SERPL-CCNC: 20 U/L
BILIRUB SERPL-MCNC: 0.4 MG/DL
BUN SERPL-MCNC: 26 MG/DL
CALCIUM SERPL-MCNC: 9.8 MG/DL
CHLORIDE SERPL-SCNC: 100 MMOL/L
CHOLEST SERPL-MCNC: 105 MG/DL
CO2 SERPL-SCNC: 20 MMOL/L
CREAT SERPL-MCNC: 1.91 MG/DL
EGFR: 36 ML/MIN/1.73M2
GLUCOSE SERPL-MCNC: 216 MG/DL
HDLC SERPL-MCNC: 30 MG/DL
LDLC SERPL CALC-MCNC: 46 MG/DL
NONHDLC SERPL-MCNC: 76 MG/DL
POTASSIUM SERPL-SCNC: 4.1 MMOL/L
PROT SERPL-MCNC: 6.9 G/DL
SODIUM SERPL-SCNC: 142 MMOL/L
TRIGL SERPL-MCNC: 149 MG/DL

## 2022-06-06 ENCOUNTER — APPOINTMENT (OUTPATIENT)
Dept: CARDIOLOGY | Facility: CLINIC | Age: 77
End: 2022-06-06
Payer: MEDICARE

## 2022-06-06 VITALS
DIASTOLIC BLOOD PRESSURE: 60 MMHG | SYSTOLIC BLOOD PRESSURE: 128 MMHG | WEIGHT: 209 LBS | HEIGHT: 71 IN | HEART RATE: 56 BPM | BODY MASS INDEX: 29.26 KG/M2 | OXYGEN SATURATION: 98 %

## 2022-06-06 PROCEDURE — 99214 OFFICE O/P EST MOD 30 MIN: CPT

## 2022-06-06 PROCEDURE — 93000 ELECTROCARDIOGRAM COMPLETE: CPT

## 2022-06-06 NOTE — CARDIOLOGY SUMMARY
[de-identified] : 6/6/22   sinus bradycardia 40s firsrt degree AV block  non specific ST changes no signficant change from prior  [de-identified] : 8/5/21 equivocal EKG changes resolved quickly  12 METS , 88%MPHR inferior wall fixed defect with normal motion  diaphragmatic attenuation ,  no ischemia , EF 61%  [de-identified] : 6/15/21   EF 61% Mild LVH :  mild aortic sclerosis, mild  mitral regurgitation and tricuspid regurgitation. Estimated right ventricular systolic pressure 37 mm hg.

## 2022-06-06 NOTE — REVIEW OF SYSTEMS
[Urinary Frequency] : urinary frequency [Negative] : Neurological [Earache] : no earache [Sore Throat] : no sore throat [Joint Pain] : no joint pain [FreeTextEntry8] : BPH

## 2022-06-06 NOTE — ASSESSMENT
[FreeTextEntry1] : 77 year old male with above hx\par \par \par controlled hypertension with hypertensive heart disease with abnormal EKG , continue  norvasc 10 mg po daily  \par  home BP reading , \par \par Sinus bradycardia chronic asymptomatic ,  had normal chronotropic response 8/2021 stress test  continue to monitor \par \par Fatigue   post covid , mild dehydration increase fluid intkae \par \par Dyslipidemia  low HDl  very  low LDL , decrease atorvastatin to 10 mg po daily , continue fenfibrate 145 mg po daily \par \par CKD  : mild worsened creatinine ,  dehydration , encourage po fluid intake  . \par \par DM   not well  controlled  HB A1c 7.2  strict diet  exercise , \par \par follow up after 4 months

## 2022-06-06 NOTE — REASON FOR VISIT
[Arrhythmia/ECG Abnorrmalities] : arrhythmia/ECG abnormalities [Hyperlipidemia] : hyperlipidemia [Hypertension] : hypertension [Other: ____] : [unfilled] [Symptom and Test Evaluation] : symptom and test evaluation

## 2022-06-06 NOTE — HISTORY OF PRESENT ILLNESS
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 patient does not  drink water   his follow up creatinine elevated   hemoglobin 13.1  had colonoscopy 3 years ago and last week , was told to have benign polyps \par \par his blood work  TC 91    LDL 39   HDL 28     renal function showed mild elevated k worsened creatine than prior weeks blood work  , 1.93\par \par \par \par

## 2022-06-10 ENCOUNTER — APPOINTMENT (OUTPATIENT)
Dept: FAMILY MEDICINE | Facility: CLINIC | Age: 77
End: 2022-06-10
Payer: MEDICARE

## 2022-06-10 VITALS
HEIGHT: 71 IN | DIASTOLIC BLOOD PRESSURE: 57 MMHG | HEART RATE: 54 BPM | SYSTOLIC BLOOD PRESSURE: 109 MMHG | BODY MASS INDEX: 29.26 KG/M2 | WEIGHT: 209 LBS | TEMPERATURE: 98.7 F | OXYGEN SATURATION: 99 %

## 2022-06-10 DIAGNOSIS — M25.559 PAIN IN UNSPECIFIED HIP: ICD-10-CM

## 2022-06-10 PROCEDURE — 99213 OFFICE O/P EST LOW 20 MIN: CPT

## 2022-06-10 NOTE — HISTORY OF PRESENT ILLNESS
[FreeTextEntry8] : pt is here for L hip pain\par Pain occurs to lateral hip but only lasts for 5-10 seconds and only happens a couple of times per day.\par Denies any injury or history of arthritis.

## 2022-06-10 NOTE — PHYSICAL EXAM
[Normal] : no joint swelling and grossly normal strength and tone [de-identified] : FROM of back and hip without limitations or tenderness. Normal stength and sensation

## 2022-06-10 NOTE — PLAN
[FreeTextEntry1] : Advised at length\par rest\par Warm compresses\par Advil PRN\par Stretching exercises. \par IF not improved would send for PT\par If still not improved could consider imaging.\par Reassured. Pt agreeable to plan

## 2022-07-01 ENCOUNTER — APPOINTMENT (OUTPATIENT)
Dept: ENDOCRINOLOGY | Facility: CLINIC | Age: 77
End: 2022-07-01

## 2022-07-01 VITALS
HEIGHT: 71 IN | OXYGEN SATURATION: 99 % | DIASTOLIC BLOOD PRESSURE: 78 MMHG | WEIGHT: 209 LBS | SYSTOLIC BLOOD PRESSURE: 125 MMHG | HEART RATE: 43 BPM | BODY MASS INDEX: 29.26 KG/M2 | TEMPERATURE: 98.4 F

## 2022-07-01 LAB — GLUCOSE BLDC GLUCOMTR-MCNC: 150

## 2022-07-01 PROCEDURE — 99214 OFFICE O/P EST MOD 30 MIN: CPT

## 2022-07-01 PROCEDURE — 82962 GLUCOSE BLOOD TEST: CPT

## 2022-07-01 NOTE — HISTORY OF PRESENT ILLNESS
[FreeTextEntry1] : Mr. Dey is a 76 year old male who returns today in follow up with regard to a history of type 2 diabetes mellitus. There is no known history of retinopathy or nephropathy. He too denies any history of neuropathy. on losartan and amlodipine 10mg Current dm medications include no longer taking Januvia 50 mg per day one tablet daily, Jardiance 10 mg po daily, Levemir 25-28 units in the am and 20-30 units hs and novolog 20-25  units at most meals. Taking 4-5 injections per day. HGM has shown values in the -160 ,  with one outlier of 209 during lunch time, 119 to as high as 195 during dinner time and 102- as high as 186 hs.  \par \par He denies any chest pain, sob, neurologic or ophthalmologic complaints. He too denies any new podiatric concerns. He is not up to date with his ophthalmologic visit. Additional diagnose include: hyperlipidemia Does have mild hypercalcemia which may be Non PTH mediated. Recent labs with calcium normal and pth low end at 10.\par Does follow with nephrologist Dr. Snowden-latest Creatinine at 1.53 from 1/11/2022\par \par POCT Glucose returned today at 150mg/dL\par A1C done June 2 2022 at 7.2% \par \par Additional labs from 1/11/2022 shoed A1c at 7.1%. TFT's, Vit d 25-OH and cmp were otherwise wnl. Fructosamine returned at 270.\par On Tamsulosin and now Bethanecol per urol Dr. Tate; Cardiologist did adjust his atorvastatin to take 10mg instead of 20mg recently in early June. No longer taking D3 3,000 iu daily\par Had covid vaccine \par Did have Covid in April 2022 and was on steroids \par Is looking to consult with orthopedist regarding L hip pain and is undergoing PT

## 2022-07-05 LAB
CREAT SPEC-SCNC: 68 MG/DL
MICROALBUMIN 24H UR DL<=1MG/L-MCNC: <1.2 MG/DL
MICROALBUMIN/CREAT 24H UR-RTO: NORMAL MG/G

## 2022-08-08 NOTE — ED ADULT NURSE NOTE - LATERALITY
left Prednisone Pregnancy And Lactation Text: This medication is Pregnancy Category C and it isn't know if it is safe during pregnancy. This medication is excreted in breast milk.

## 2022-10-03 ENCOUNTER — APPOINTMENT (OUTPATIENT)
Dept: CARDIOLOGY | Facility: CLINIC | Age: 77
End: 2022-10-03

## 2022-10-03 ENCOUNTER — NON-APPOINTMENT (OUTPATIENT)
Age: 77
End: 2022-10-03

## 2022-10-03 VITALS
WEIGHT: 215 LBS | SYSTOLIC BLOOD PRESSURE: 106 MMHG | DIASTOLIC BLOOD PRESSURE: 54 MMHG | OXYGEN SATURATION: 97 % | HEIGHT: 71 IN | BODY MASS INDEX: 30.1 KG/M2 | HEART RATE: 71 BPM

## 2022-10-03 LAB
ALBUMIN SERPL ELPH-MCNC: 4.1 G/DL
ALP BLD-CCNC: 60 U/L
ALT SERPL-CCNC: 23 U/L
ANION GAP SERPL CALC-SCNC: 10 MMOL/L
AST SERPL-CCNC: 26 U/L
BASOPHILS # BLD AUTO: 0.05 K/UL
BASOPHILS NFR BLD AUTO: 0.6 %
BILIRUB SERPL-MCNC: 0.5 MG/DL
BUN SERPL-MCNC: 30 MG/DL
CALCIUM SERPL-MCNC: 10.2 MG/DL
CHLORIDE SERPL-SCNC: 103 MMOL/L
CHOLEST SERPL-MCNC: 119 MG/DL
CO2 SERPL-SCNC: 27 MMOL/L
CREAT SERPL-MCNC: 1.67 MG/DL
EGFR: 42 ML/MIN/1.73M2
EOSINOPHIL # BLD AUTO: 0.23 K/UL
EOSINOPHIL NFR BLD AUTO: 2.7 %
ESTIMATED AVERAGE GLUCOSE: 160 MG/DL
GLUCOSE SERPL-MCNC: 236 MG/DL
HBA1C MFR BLD HPLC: 7.2 %
HCT VFR BLD CALC: 41.1 %
HDLC SERPL-MCNC: 29 MG/DL
HGB BLD-MCNC: 13.1 G/DL
IMM GRANULOCYTES NFR BLD AUTO: 0.5 %
LDLC SERPL CALC-MCNC: 54 MG/DL
LYMPHOCYTES # BLD AUTO: 1.55 K/UL
LYMPHOCYTES NFR BLD AUTO: 18.2 %
MAN DIFF?: NORMAL
MCHC RBC-ENTMCNC: 30.3 PG
MCHC RBC-ENTMCNC: 31.9 GM/DL
MCV RBC AUTO: 95.1 FL
MONOCYTES # BLD AUTO: 0.52 K/UL
MONOCYTES NFR BLD AUTO: 6.1 %
NEUTROPHILS # BLD AUTO: 6.13 K/UL
NEUTROPHILS NFR BLD AUTO: 71.9 %
NONHDLC SERPL-MCNC: 90 MG/DL
PLATELET # BLD AUTO: 313 K/UL
POTASSIUM SERPL-SCNC: 4.3 MMOL/L
PROT SERPL-MCNC: 6.6 G/DL
RBC # BLD: 4.32 M/UL
RBC # FLD: 13.3 %
SODIUM SERPL-SCNC: 140 MMOL/L
TRIGL SERPL-MCNC: 178 MG/DL
WBC # FLD AUTO: 8.52 K/UL

## 2022-10-03 PROCEDURE — 93000 ELECTROCARDIOGRAM COMPLETE: CPT

## 2022-10-03 PROCEDURE — 99214 OFFICE O/P EST MOD 30 MIN: CPT

## 2022-10-03 RX ORDER — OMEGA-3/DHA/EPA/FISH OIL 300-1000MG
1000 CAPSULE ORAL DAILY
Qty: 90 | Refills: 5 | Status: ACTIVE | COMMUNITY

## 2022-10-03 NOTE — HISTORY OF PRESENT ILLNESS
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 patient does have resting sinus bradycardia ,Patient denies any dizziness or exertional fatigue , patient did have normal chronotropic response to exercise  , test done in august 2021 , no evidence of ischemia \par \par his blood work showed ,   his Hb A1c7.2  . patient does not  drink water   his follow up creatinine elevated   hemoglobin 13.1  had colonoscopy 3 years ago and last week , was told to have benign polyps \par \par his blood work  TC 91    LDL 39   HDL 28     renal function showed stable  creatine , 1.67 \par \par \par \par

## 2022-10-03 NOTE — CARDIOLOGY SUMMARY
[de-identified] : 6/6/22   sinus bradycardia 40s firsrt degree AV block  non specific ST changes no signficant change from prior  [de-identified] : 8/5/21 equivocal EKG changes resolved quickly  12 METS , 88%MPHR inferior wall fixed defect with normal motion  diaphragmatic attenuation ,  no ischemia , EF 61%  [de-identified] : 6/15/21   EF 61% Mild LVH :  mild aortic sclerosis, mild  mitral regurgitation and tricuspid regurgitation. Estimated right ventricular systolic pressure 37 mm hg.

## 2022-10-03 NOTE — PHYSICAL EXAM
[No Gallop] : no gallop [Normal Rate] : normal [Normal S1] : normal S1 [Normal S2] : normal S2 [II] : a grade 2 [No Pitting Edema] : no pitting edema present [2+] : left 2+ [No Abnormalities] : the abdominal aorta was not enlarged and no bruit was heard [Soft] : abdomen soft [Non Tender] : non-tender [Normal Radial B/L] : normal radial B/L [Normal PT B/L] : normal PT B/L [Normal DP B/L] : normal DP B/L [Normal] : no rash, no skin lesions [S3] : no S3 [S4] : no S4 [Right Carotid Bruit] : no bruit heard over the right carotid [Left Carotid Bruit] : no bruit heard over the left carotid [Right Femoral Bruit] : no bruit heard over the right femoral artery [Left Femoral Bruit] : no bruit heard over the left femoral artery

## 2022-10-03 NOTE — ASSESSMENT
[FreeTextEntry1] : 77 year old male with above hx\par \par \par controlled hypertension with hypertensive heart disease with abnormal EKG , continue  norvasc 10 mg po daily  \par  home BP reading , \par \par Sinus bradycardia chronic asymptomatic ,  had normal chronotropic response 8/2021 stress test  continue to monitor \par \par Dyslipidemia  low HDl  very  low LDL ,  continue atorvastatin to 10 mg po daily , continue fenfibrate 145 mg po daily \par \par CKD  : stable mild improved  creatinine ,  dehydration , encourage po fluid intake  . \par \par DM   not well  controlled  HB A1c 7.2  strict diet  exercise , \par \par follow up after 4 months

## 2022-10-18 ENCOUNTER — RX RENEWAL (OUTPATIENT)
Age: 77
End: 2022-10-18

## 2022-11-01 ENCOUNTER — APPOINTMENT (OUTPATIENT)
Dept: ENDOCRINOLOGY | Facility: CLINIC | Age: 77
End: 2022-11-01

## 2022-11-01 VITALS
BODY MASS INDEX: 30.43 KG/M2 | TEMPERATURE: 98.2 F | WEIGHT: 217.38 LBS | HEIGHT: 71 IN | DIASTOLIC BLOOD PRESSURE: 82 MMHG | OXYGEN SATURATION: 97 % | SYSTOLIC BLOOD PRESSURE: 124 MMHG | HEART RATE: 58 BPM

## 2022-11-01 LAB
GLUCOSE BLDC GLUCOMTR-MCNC: 221
HBA1C MFR BLD HPLC: 6.2

## 2022-11-01 PROCEDURE — 99214 OFFICE O/P EST MOD 30 MIN: CPT | Mod: 25

## 2022-11-01 PROCEDURE — 95251 CONT GLUC MNTR ANALYSIS I&R: CPT

## 2022-11-01 PROCEDURE — 83036 HEMOGLOBIN GLYCOSYLATED A1C: CPT | Mod: QW

## 2022-11-02 LAB
CREAT SPEC-SCNC: 110 MG/DL
MICROALBUMIN 24H UR DL<=1MG/L-MCNC: <1.2 MG/DL
MICROALBUMIN/CREAT 24H UR-RTO: NORMAL MG/G

## 2022-11-06 NOTE — HISTORY OF PRESENT ILLNESS
[FreeTextEntry1] : Mr. Dey is a 77 year old male who  returns today in follow up with regard to a history of type 2 diabetes mellitus. \par \par  There is no known history of retinopathy or nephropathy. He too denies any history of neuropathy. Current dm medications include Januvia 50 mg per day one tablet daily, Jardiance 10 mg po daily,  Levemir  about 35-38 units in the am and 35 units hs  hs and novolog 35 units at most meals. Taking 4-5 injections per day. \par \par HGM has shown values running elevated in the 180s overnight, but values are in the mid 100s in the AM and throughout the day with elevations after meals that come down within 2 hours. \par \par He denies any chest pain, sob, neurologic or ophthalmologic complaints. He too denies any new podiatric concerns. He is up to date \par with his ophthalmologic visit. Additional diagnose include: hyperlipidemia Does have mild hypercalcemia which may be Non PTH mediated. Recent labs with calcium normal and pth low end at 10.\par \par Does follow with nephrologist Dr. Snowden-latest Creatinine at 1.53 from 1/11/2022\par Additional labs from 1/11/2022 shoed A1c at 7.1%. TFT's, Vit d 25-OH and cmp were otherwise wnl. Fructosamine returned at 270.\par On Tamsulosin and now Bethanecol per urol Dr. Tate\par On losartan and amlodipine 10mg \par D3 3,000 iu daily\par Had covid vaccine

## 2022-11-12 ENCOUNTER — EMERGENCY (EMERGENCY)
Facility: HOSPITAL | Age: 77
LOS: 0 days | Discharge: HOME | End: 2022-11-12
Attending: EMERGENCY MEDICINE
Payer: MEDICARE

## 2022-11-12 VITALS
DIASTOLIC BLOOD PRESSURE: 69 MMHG | SYSTOLIC BLOOD PRESSURE: 150 MMHG | TEMPERATURE: 98 F | HEART RATE: 55 BPM | OXYGEN SATURATION: 97 % | RESPIRATION RATE: 13 BRPM

## 2022-11-12 VITALS — WEIGHT: 214.95 LBS | HEIGHT: 71 IN

## 2022-11-12 DIAGNOSIS — Z98.890 OTHER SPECIFIED POSTPROCEDURAL STATES: Chronic | ICD-10-CM

## 2022-11-12 DIAGNOSIS — R07.89 OTHER CHEST PAIN: ICD-10-CM

## 2022-11-12 DIAGNOSIS — E11.9 TYPE 2 DIABETES MELLITUS WITHOUT COMPLICATIONS: ICD-10-CM

## 2022-11-12 DIAGNOSIS — R06.02 SHORTNESS OF BREATH: ICD-10-CM

## 2022-11-12 DIAGNOSIS — C85.90 NON-HODGKIN LYMPHOMA, UNSPECIFIED, UNSPECIFIED SITE: ICD-10-CM

## 2022-11-12 DIAGNOSIS — Z20.822 CONTACT WITH AND (SUSPECTED) EXPOSURE TO COVID-19: ICD-10-CM

## 2022-11-12 DIAGNOSIS — Z88.9 ALLERGY STATUS TO UNSPECIFIED DRUGS, MEDICAMENTS AND BIOLOGICAL SUBSTANCES: ICD-10-CM

## 2022-11-12 DIAGNOSIS — Z79.4 LONG TERM (CURRENT) USE OF INSULIN: ICD-10-CM

## 2022-11-12 DIAGNOSIS — Z87.09 PERSONAL HISTORY OF OTHER DISEASES OF THE RESPIRATORY SYSTEM: ICD-10-CM

## 2022-11-12 DIAGNOSIS — Z98.890 OTHER SPECIFIED POSTPROCEDURAL STATES: ICD-10-CM

## 2022-11-12 DIAGNOSIS — Z90.89 ACQUIRED ABSENCE OF OTHER ORGANS: ICD-10-CM

## 2022-11-12 DIAGNOSIS — N40.0 BENIGN PROSTATIC HYPERPLASIA WITHOUT LOWER URINARY TRACT SYMPTOMS: ICD-10-CM

## 2022-11-12 DIAGNOSIS — Z79.82 LONG TERM (CURRENT) USE OF ASPIRIN: ICD-10-CM

## 2022-11-12 DIAGNOSIS — F17.210 NICOTINE DEPENDENCE, CIGARETTES, UNCOMPLICATED: ICD-10-CM

## 2022-11-12 DIAGNOSIS — Z87.2 PERSONAL HISTORY OF DISEASES OF THE SKIN AND SUBCUTANEOUS TISSUE: ICD-10-CM

## 2022-11-12 DIAGNOSIS — Z86.39 PERSONAL HISTORY OF OTHER ENDOCRINE, NUTRITIONAL AND METABOLIC DISEASE: ICD-10-CM

## 2022-11-12 DIAGNOSIS — Z90.89 ACQUIRED ABSENCE OF OTHER ORGANS: Chronic | ICD-10-CM

## 2022-11-12 DIAGNOSIS — E78.00 PURE HYPERCHOLESTEROLEMIA, UNSPECIFIED: ICD-10-CM

## 2022-11-12 LAB
ALBUMIN SERPL ELPH-MCNC: 3.8 G/DL — SIGNIFICANT CHANGE UP (ref 3.3–5)
ALP SERPL-CCNC: 55 U/L — SIGNIFICANT CHANGE UP (ref 40–120)
ALT FLD-CCNC: 32 U/L — SIGNIFICANT CHANGE UP (ref 12–78)
ANION GAP SERPL CALC-SCNC: 4 MMOL/L — LOW (ref 5–17)
APTT BLD: 34.1 SEC — SIGNIFICANT CHANGE UP (ref 27.5–35.5)
AST SERPL-CCNC: 24 U/L — SIGNIFICANT CHANGE UP (ref 15–37)
BASOPHILS # BLD AUTO: 0.06 K/UL — SIGNIFICANT CHANGE UP (ref 0–0.2)
BASOPHILS NFR BLD AUTO: 0.5 % — SIGNIFICANT CHANGE UP (ref 0–2)
BILIRUB SERPL-MCNC: 0.5 MG/DL — SIGNIFICANT CHANGE UP (ref 0.2–1.2)
BUN SERPL-MCNC: 29 MG/DL — HIGH (ref 7–23)
CALCIUM SERPL-MCNC: 9.7 MG/DL — SIGNIFICANT CHANGE UP (ref 8.5–10.1)
CHLORIDE SERPL-SCNC: 112 MMOL/L — HIGH (ref 96–108)
CO2 SERPL-SCNC: 26 MMOL/L — SIGNIFICANT CHANGE UP (ref 22–31)
CREAT SERPL-MCNC: 1.78 MG/DL — HIGH (ref 0.5–1.3)
D DIMER BLD IA.RAPID-MCNC: <150 NG/ML DDU — SIGNIFICANT CHANGE UP
EGFR: 39 ML/MIN/1.73M2 — LOW
EOSINOPHIL # BLD AUTO: 0.18 K/UL — SIGNIFICANT CHANGE UP (ref 0–0.5)
EOSINOPHIL NFR BLD AUTO: 1.4 % — SIGNIFICANT CHANGE UP (ref 0–6)
FLUAV AG NPH QL: SIGNIFICANT CHANGE UP
FLUBV AG NPH QL: SIGNIFICANT CHANGE UP
GLUCOSE SERPL-MCNC: 61 MG/DL — LOW (ref 70–99)
HCT VFR BLD CALC: 42.8 % — SIGNIFICANT CHANGE UP (ref 39–50)
HGB BLD-MCNC: 13.8 G/DL — SIGNIFICANT CHANGE UP (ref 13–17)
IMM GRANULOCYTES NFR BLD AUTO: 0.5 % — SIGNIFICANT CHANGE UP (ref 0–0.9)
INR BLD: 1.1 RATIO — SIGNIFICANT CHANGE UP (ref 0.88–1.16)
LYMPHOCYTES # BLD AUTO: 1.73 K/UL — SIGNIFICANT CHANGE UP (ref 1–3.3)
LYMPHOCYTES # BLD AUTO: 13.9 % — SIGNIFICANT CHANGE UP (ref 13–44)
MCHC RBC-ENTMCNC: 30.8 PG — SIGNIFICANT CHANGE UP (ref 27–34)
MCHC RBC-ENTMCNC: 32.2 GM/DL — SIGNIFICANT CHANGE UP (ref 32–36)
MCV RBC AUTO: 95.5 FL — SIGNIFICANT CHANGE UP (ref 80–100)
MONOCYTES # BLD AUTO: 1.15 K/UL — HIGH (ref 0–0.9)
MONOCYTES NFR BLD AUTO: 9.2 % — SIGNIFICANT CHANGE UP (ref 2–14)
NEUTROPHILS # BLD AUTO: 9.31 K/UL — HIGH (ref 1.8–7.4)
NEUTROPHILS NFR BLD AUTO: 74.5 % — SIGNIFICANT CHANGE UP (ref 43–77)
NT-PROBNP SERPL-SCNC: 290 PG/ML — SIGNIFICANT CHANGE UP (ref 0–450)
PLATELET # BLD AUTO: 345 K/UL — SIGNIFICANT CHANGE UP (ref 150–400)
POTASSIUM SERPL-MCNC: 3.9 MMOL/L — SIGNIFICANT CHANGE UP (ref 3.5–5.3)
POTASSIUM SERPL-SCNC: 3.9 MMOL/L — SIGNIFICANT CHANGE UP (ref 3.5–5.3)
PROT SERPL-MCNC: 8 GM/DL — SIGNIFICANT CHANGE UP (ref 6–8.3)
PROTHROM AB SERPL-ACNC: 12.8 SEC — SIGNIFICANT CHANGE UP (ref 10.5–13.4)
RBC # BLD: 4.48 M/UL — SIGNIFICANT CHANGE UP (ref 4.2–5.8)
RBC # FLD: 13.2 % — SIGNIFICANT CHANGE UP (ref 10.3–14.5)
RSV RNA NPH QL NAA+NON-PROBE: SIGNIFICANT CHANGE UP
SARS-COV-2 RNA SPEC QL NAA+PROBE: SIGNIFICANT CHANGE UP
SODIUM SERPL-SCNC: 142 MMOL/L — SIGNIFICANT CHANGE UP (ref 135–145)
TROPONIN I, HIGH SENSITIVITY RESULT: 35.51 NG/L — SIGNIFICANT CHANGE UP
TROPONIN I, HIGH SENSITIVITY RESULT: 36.05 NG/L — SIGNIFICANT CHANGE UP
WBC # BLD: 12.49 K/UL — HIGH (ref 3.8–10.5)
WBC # FLD AUTO: 12.49 K/UL — HIGH (ref 3.8–10.5)

## 2022-11-12 PROCEDURE — 80053 COMPREHEN METABOLIC PANEL: CPT

## 2022-11-12 PROCEDURE — 93010 ELECTROCARDIOGRAM REPORT: CPT

## 2022-11-12 PROCEDURE — 0241U: CPT

## 2022-11-12 PROCEDURE — 99285 EMERGENCY DEPT VISIT HI MDM: CPT | Mod: 25

## 2022-11-12 PROCEDURE — 85379 FIBRIN DEGRADATION QUANT: CPT

## 2022-11-12 PROCEDURE — 85730 THROMBOPLASTIN TIME PARTIAL: CPT

## 2022-11-12 PROCEDURE — 85025 COMPLETE CBC W/AUTO DIFF WBC: CPT

## 2022-11-12 PROCEDURE — 71045 X-RAY EXAM CHEST 1 VIEW: CPT | Mod: 26

## 2022-11-12 PROCEDURE — 99285 EMERGENCY DEPT VISIT HI MDM: CPT | Mod: FS,CS

## 2022-11-12 PROCEDURE — 93005 ELECTROCARDIOGRAM TRACING: CPT

## 2022-11-12 PROCEDURE — 84484 ASSAY OF TROPONIN QUANT: CPT

## 2022-11-12 PROCEDURE — 36415 COLL VENOUS BLD VENIPUNCTURE: CPT

## 2022-11-12 PROCEDURE — 85610 PROTHROMBIN TIME: CPT

## 2022-11-12 PROCEDURE — 83880 ASSAY OF NATRIURETIC PEPTIDE: CPT

## 2022-11-12 PROCEDURE — 71045 X-RAY EXAM CHEST 1 VIEW: CPT

## 2022-11-12 NOTE — ED ADULT NURSE NOTE - OBJECTIVE STATEMENT
Patient presents to the emergency room with complaints of chest pain. Patient states he called his cardiologist Dr. Palla and per Dr. Askew, patient instructed to come to the ER for evaluation of chest pain, dizziness, shortness of breath. Patient with history of heart murmur, diabetes. Patient has blood glucose monitor to right arm and patient's blood glucose 61. Patient complained of numbness/tingling to left hand. Patient eating hubert crackers and drinking juice. Patient awake, alert, oriented, stated he did not eat lunch today. Patient takes insulin long acting twice a day and meal coverage. Patient denies syncope, diaphoresis, N/V. Patient bradycardic.

## 2022-11-12 NOTE — ED STATDOCS - CARE PROVIDER_API CALL
Morris Burnett)  Cardiology; Interventional Cardiology  [address] [facility] [address] [place] [address]  Phone: [phone]  Fax: [phone]  Follow Up Time: Urgent

## 2022-11-12 NOTE — ED ADULT TRIAGE NOTE - CHIEF COMPLAINT QUOTE
Pt. to the ED sent in by Cardiologist for Cardiac Evaluation- CP, SOB and Dizziness -- Hx of Low Heart Rate ( 58 @ Triage) and Heart Murmur -- EKG STAT

## 2022-11-12 NOTE — ED STATDOCS - CLINICAL SUMMARY MEDICAL DECISION MAKING FREE TEXT BOX
No evidence of ACS, pericarditis, myocarditis, pulmonary embolism, pneumothorax, pneumonia, Zoster, or esophageal perforation. Historically not abrupt in onset, tearing or ripping, pulses symmetric, no evidence of aortic dissection.  Recommend f/u with cardiology, strict return precautions given.

## 2022-11-12 NOTE — ED STATDOCS - OBJECTIVE STATEMENT
78 y/o male with a PMHx of eczema, non-hog, URI, BPH, DM2 presents to the  ED c/o CP. Notes he has some chest discomfort today with associated SOB so called cardio MD and was sent to ED. +cigar smoker.

## 2022-11-12 NOTE — ED STATDOCS - PROGRESS NOTE DETAILS
pt aware of results and imaging will repeat trop at 6pm, pt agrees with plan and well appearing will continue to follow. -Yenny Mora PA-C pt aware of second ce and will fu with cardiologist Dr. Burnett and pt well appearing on dc. -Yenny Mora PA-C

## 2022-11-12 NOTE — ED STATDOCS - NS ED ATTENDING STATEMENT MOD
This was a shared visit with the GIANNI. I reviewed and verified the documentation and independently performed the documented:

## 2022-11-14 ENCOUNTER — APPOINTMENT (OUTPATIENT)
Dept: CARDIOLOGY | Facility: CLINIC | Age: 77
End: 2022-11-14

## 2022-11-14 ENCOUNTER — NON-APPOINTMENT (OUTPATIENT)
Age: 77
End: 2022-11-14

## 2022-11-14 VITALS
HEIGHT: 71 IN | OXYGEN SATURATION: 98 % | BODY MASS INDEX: 30.52 KG/M2 | SYSTOLIC BLOOD PRESSURE: 124 MMHG | DIASTOLIC BLOOD PRESSURE: 52 MMHG | WEIGHT: 218 LBS | HEART RATE: 62 BPM

## 2022-11-14 DIAGNOSIS — R07.89 OTHER CHEST PAIN: ICD-10-CM

## 2022-11-14 PROCEDURE — 99215 OFFICE O/P EST HI 40 MIN: CPT

## 2022-11-14 PROCEDURE — 93000 ELECTROCARDIOGRAM COMPLETE: CPT

## 2022-11-14 NOTE — HISTORY OF PRESENT ILLNESS
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 patient also had left sided chest discomfort not related to above shortness of breath , non radiating ,mostly at rest , it occurs even activity , he feels discomfort  no shortness of breath ,    Patient blood work \par \par Patient also feels mild numbness of left hand some times not related to above \par \par patient blood sugars are fairly improved  , his blood pressure is controlled .\par \par patient does have resting sinus bradycardia ,Patient denies any dizziness or exertional fatigue , patient did have normal chronotropic response to exercise  ,\par \par his blood work showed ,   his Hb A1c7.2  . patient does not  drink water   his follow up creatinine elevated   hemoglobin 13.1  had colonoscopy 3 years ago and last week , was told to have benign polyps \par \par his blood work     LDL 54  HDL 29   September 2022 ,  renal function showed stable  creatine , 1.78 normal troponin in the hospital , CXR showed no active disease \par \par Hospital records reviewed \par \par \par

## 2022-11-14 NOTE — ASSESSMENT
[FreeTextEntry1] : 77 year old male with above hx\par \par DAY : multifactorial  COPD allergies ? diastolic dysfunction ,  likely CAD  but patient had CKD , would attempt to give bronchodilator treatment first , echo , see the response , if does not improve will get stress vs cath \par \par \par controlled hypertension with hypertensive heart disease with abnormal EKG , continue  norvasc 10 mg po daily  \par  home BP reading , \par \par Sinus bradycardia chronic asymptomatic ,  had normal chronotropic response 8/2021 stress test  continue to monitor \par \par Dyslipidemia  low HDl  very  low LDL ,  continue atorvastatin to 10 mg po daily , continue fenfibrate 145 mg po daily \par \par CKD  : stable mild improved  creatinine ,  dehydration , encourage po fluid intake  . \par \par DM   not well  controlled  HB A1c 7.2  strict diet  exercise , \par \par follow up after 4 months

## 2022-11-14 NOTE — REVIEW OF SYSTEMS
[Earache] : no earache [Sore Throat] : no sore throat [Urinary Frequency] : urinary frequency [Joint Pain] : no joint pain [Negative] : Neurological [FreeTextEntry8] : BPH

## 2022-11-14 NOTE — CARDIOLOGY SUMMARY
[de-identified] : 11/14/22    sinus bradycardia 40s firsrt degree AV block  non specific ST changes no signficant change from prior  [de-identified] : 8/5/21 equivocal EKG changes resolved quickly  12 METS , 88%MPHR inferior wall fixed defect with normal motion  diaphragmatic attenuation ,  no ischemia , EF 61%  [de-identified] : 6/15/21   EF 61% Mild LVH :  mild aortic sclerosis, mild  mitral regurgitation and tricuspid regurgitation. Estimated right ventricular systolic pressure 37 mm hg.

## 2022-11-14 NOTE — PHYSICAL EXAM
[No Gallop] : no gallop [Normal Rate] : normal [Normal S1] : normal S1 [Normal S2] : normal S2 [S3] : no S3 [S4] : no S4 [II] : a grade 2 [No Pitting Edema] : no pitting edema present [Right Carotid Bruit] : no bruit heard over the right carotid [Left Carotid Bruit] : no bruit heard over the left carotid [Right Femoral Bruit] : no bruit heard over the right femoral artery [Left Femoral Bruit] : no bruit heard over the left femoral artery [2+] : left 2+ [No Abnormalities] : the abdominal aorta was not enlarged and no bruit was heard [Soft] : abdomen soft [Non Tender] : non-tender [Normal Radial B/L] : normal radial B/L [Normal PT B/L] : normal PT B/L [Normal DP B/L] : normal DP B/L [Normal] : no rash, no skin lesions

## 2022-11-17 ENCOUNTER — APPOINTMENT (OUTPATIENT)
Dept: CARDIOLOGY | Facility: CLINIC | Age: 77
End: 2022-11-17

## 2022-11-17 PROCEDURE — 93306 TTE W/DOPPLER COMPLETE: CPT

## 2022-11-28 ENCOUNTER — APPOINTMENT (OUTPATIENT)
Dept: CARDIOLOGY | Facility: CLINIC | Age: 77
End: 2022-11-28

## 2022-11-28 VITALS
HEART RATE: 60 BPM | HEIGHT: 71 IN | DIASTOLIC BLOOD PRESSURE: 60 MMHG | OXYGEN SATURATION: 97 % | SYSTOLIC BLOOD PRESSURE: 132 MMHG | BODY MASS INDEX: 30.52 KG/M2 | WEIGHT: 218 LBS

## 2022-11-28 PROCEDURE — 99214 OFFICE O/P EST MOD 30 MIN: CPT

## 2022-11-28 NOTE — CARDIOLOGY SUMMARY
[de-identified] : 11/14/22    sinus bradycardia 40s firsrt degree AV block  non specific ST changes no significant change from prior  [de-identified] : 8/5/21 equivocal EKG changes resolved quickly  12 METS , 88%MPHR inferior wall fixed defect with normal motion  diaphragmatic attenuation ,  no ischemia , EF 61%  [de-identified] : 6/15/21   EF 61% Mild LVH :  mild aortic sclerosis, mild  mitral regurgitation and tricuspid regurgitation. Estimated right ventricular systolic pressure 46  mm hg.

## 2022-11-28 NOTE — HISTORY OF PRESENT ILLNESS
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 who came for follow up says his shortness of breath is not improving despite taking inhaler and allegra , no chest pain \par \par Patient had echo showed LVH normal EF mild MR TR \par \par  patient also had left sided chest discomfort not related to above shortness of breath , non radiating ,mostly at rest , it occurs even activity , he feels discomfort  no shortness of breath ,  \par \par Patient also feels mild numbness of left hand some times not related to above \par \par patient blood sugars are fairly improved  , his blood pressure is controlled .\par \par patient does have resting sinus bradycardia ,Patient denies any dizziness or exertional fatigue , patient did have normal chronotropic response to exercise  ,\par \par his blood work showed ,   his Hb A1c7.2  . patient does not  drink water   his follow up creatinine elevated   hemoglobin 13.1  had colonoscopy 3 years ago and last week , was told to have benign polyps \par \par his blood work     LDL 54  HDL 29   September 2022 ,  renal function showed stable  creatine , 1.78 normal troponin in the hospital , CXR showed no active disease \par \par \par \par \par

## 2022-11-28 NOTE — ASSESSMENT
[FreeTextEntry1] : 77 year old male with above hx\par \par DAY : multifactorial  not improving with bronchodilator and allegra  diastolic dysfunction ,  likely CAD  but patient had CKD ,  will schedule him for cardiac cath ,  discussed with nephrology dr rocha who will see him this week \par \par \par controlled hypertension with hypertensive heart disease with abnormal EKG , continue  norvasc 10 mg po daily  \par  home BP reading , \par \par Sinus bradycardia chronic asymptomatic ,  had normal chronotropic response 8/2021 stress test  continue to monitor \par \par Dyslipidemia  low HDl  very  low LDL ,  continue atorvastatin to 10 mg po daily , continue fenfibrate 145 mg po daily \par \par CKD  : stable mild improved  creatinine ,  dehydration , encourage po fluid intake  . \par \par DM   not well  controlled  HB A1c 7.2  strict diet  exercise , \par \par follow up after  the procedure  resolved

## 2022-12-01 VITALS
TEMPERATURE: 97.5 F | BODY MASS INDEX: 29.57 KG/M2 | SYSTOLIC BLOOD PRESSURE: 138 MMHG | WEIGHT: 212 LBS | DIASTOLIC BLOOD PRESSURE: 60 MMHG

## 2022-12-04 LAB
ALBUMIN SERPL ELPH-MCNC: 4 G/DL
ALP BLD-CCNC: 61 U/L
ALT SERPL-CCNC: 26 U/L
ANION GAP SERPL CALC-SCNC: 12 MMOL/L
AST SERPL-CCNC: 27 U/L
BILIRUB SERPL-MCNC: 0.4 MG/DL
BUN SERPL-MCNC: 31 MG/DL
CALCIUM SERPL-MCNC: 9.6 MG/DL
CHLORIDE SERPL-SCNC: 104 MMOL/L
CHOLEST SERPL-MCNC: 117 MG/DL
CO2 SERPL-SCNC: 25 MMOL/L
CREAT SERPL-MCNC: 1.79 MG/DL
EGFR: 39 ML/MIN/1.73M2
GLUCOSE SERPL-MCNC: 215 MG/DL
HDLC SERPL-MCNC: 31 MG/DL
LDLC SERPL CALC-MCNC: 63 MG/DL
NONHDLC SERPL-MCNC: 86 MG/DL
POTASSIUM SERPL-SCNC: 4.9 MMOL/L
PROT SERPL-MCNC: 6.5 G/DL
SARS-COV-2 N GENE NPH QL NAA+PROBE: NOT DETECTED
SODIUM SERPL-SCNC: 140 MMOL/L
TRIGL SERPL-MCNC: 114 MG/DL

## 2022-12-05 LAB
BASOPHILS # BLD AUTO: 0.05 K/UL
BASOPHILS NFR BLD AUTO: 0.6 %
EOSINOPHIL # BLD AUTO: 0.18 K/UL
EOSINOPHIL NFR BLD AUTO: 2.1 %
HCT VFR BLD CALC: 44.7 %
HGB BLD-MCNC: 13.8 G/DL
IMM GRANULOCYTES NFR BLD AUTO: 0.5 %
LYMPHOCYTES # BLD AUTO: 1.44 K/UL
LYMPHOCYTES NFR BLD AUTO: 16.7 %
MAN DIFF?: NORMAL
MCHC RBC-ENTMCNC: 30.9 GM/DL
MCHC RBC-ENTMCNC: 30.9 PG
MCV RBC AUTO: 100 FL
MONOCYTES # BLD AUTO: 0.5 K/UL
MONOCYTES NFR BLD AUTO: 5.8 %
NEUTROPHILS # BLD AUTO: 6.42 K/UL
NEUTROPHILS NFR BLD AUTO: 74.3 %
PLATELET # BLD AUTO: 288 K/UL
RBC # BLD: 4.47 M/UL
RBC # FLD: 13.1 %
WBC # FLD AUTO: 8.63 K/UL

## 2022-12-05 RX ORDER — INSULIN DETEMIR 100/ML (3)
60 INSULIN PEN (ML) SUBCUTANEOUS
Qty: 0 | Refills: 0 | DISCHARGE

## 2022-12-05 NOTE — H&P ADULT - HISTORY OF PRESENT ILLNESS
77yr old male PMH HTN, HLD, T2DM, CKD,  77yr old male PMH HTN, HLD, T2DM, CKD, with c/o chest pain and SOB on exertion. Pt. had a nuclear stress test 8/21 which revealed large mild to moderate defect in inferior wall that is fixed EF 61%.   .ECHO mild mitral valve regurgitation, mild tricuspid regurgitation, trace pulmonic regurgitation, severely dilated left atrium, left ventricular systolic function is normal. Pt. now referred for [facility] for further evaluation.

## 2022-12-05 NOTE — ASU PATIENT PROFILE, ADULT - MEDICATIONS TO HOLD
Instructed to Hold Levemir morning of procedure (12/6) and to 1/2 levemir dose tonight (11/5), Instructed to hold Novolog day of procedure (12/6) when NPO, Pt instructed to hold Losartan-HCTZ by cardiologist Dr Palla

## 2022-12-05 NOTE — H&P ADULT - NSHPREVIEWOFSYSTEMS_GEN_ALL_CORE
CONSTITUTIONAL: No fever, weight loss, or fatigue  EYES: No eye pain, visual disturbances, or discharge  ENMT:  No difficulty hearing, tinnitus, vertigo; No sinus or throat pain  NECK: No pain or stiffness  BREASTS: No pain, masses, or nipple discharge  RESPIRATORY: shortness of breath on exertion  CARDIOVASCULAR: +chest pain on exertion   GASTROINTESTINAL: No abdominal or epigastric pain. No nausea, vomiting, or hematemesis; No diarrhea or constipation. No melena or hematochezia.  GENITOURINARY: No dysuria, frequency, hematuria, or incontinence  NEUROLOGICAL: No headaches, memory loss, loss of strength, numbness, or tremors  SKIN: No itching, burning, rashes, or lesions   ENDOCRINE: No heat or cold intolerance; No hair loss  MUSCULOSKELETAL: No joint pain or swelling; No muscle, back, or extremity pain  PSYCHIATRIC: No depression, anxiety, mood swings, or difficulty sleeping

## 2022-12-05 NOTE — H&P ADULT - NSHPLABSRESULTS_GEN_ALL_CORE
ECHO mild mitral valve regurgitation, mild tricuspid regurgitation, trace pulmonic regurgitation, severely dilated left atrium, left ventricular systolic function is normal  Nuclear stress test large mild to moderate defect in inferior wall that is fixed EF 61%

## 2022-12-05 NOTE — H&P ADULT - ASSESSMENT
77yr old male PMH HTN, HLD, T2DM, CKD, with c/o chest pain and SOB on exertion. Pt. had a nuclear stress test 8/21 which revealed large mild to moderate defect in inferior wall that is fixed EF 61%.   .ECHO mild mitral valve regurgitation, mild tricuspid regurgitation, trace pulmonic regurgitation, severely dilated left atrium, left ventricular systolic function is normal. Pt. now referred for [facility] for further evaluation.   C risks, benefits and alternatives discussed with patient. Risk discussed included, but not limited to MI, stroke, mortality, major bleeding, arrythmia, or infection. Consent obtained and signed educational material provided. Pt. verbalizes and understands pre-procedural instructions.  ASA:  Bleeding Risk Score:  Creatinine:  GFR:    Emeka Score:  Pt. pre-hydrated with 0.9% NS @ 250cc bolus IV x1 77yr old male PMH HTN, HLD, T2DM, CKD, with c/o chest pain and SOB on exertion. Pt. had a nuclear stress test 8/21 which revealed large mild to moderate defect in inferior wall that is fixed EF 61%.   .ECHO mild mitral valve regurgitation, mild tricuspid regurgitation, trace pulmonic regurgitation, severely dilated left atrium, left ventricular systolic function is normal. Pt. now referred for [facility] for further evaluation.   C risks, benefits and alternatives discussed with patient. Risk discussed included, but not limited to MI, stroke, mortality, major bleeding, arrythmia, or infection. Consent obtained and signed educational material provided. Pt. verbalizes and understands pre-procedural instructions.  ASA: II  Bleeding Risk Score:   Creatinine:  GFR: 39  Emeka Score: 12 points   Pt. pre-hydrated with 0.9% NS @ 250cc bolus IV x1 77yr old male PMH HTN, HLD, T2DM, CKD, with c/o chest pain and SOB on exertion. Pt. had a nuclear stress test 8/21 which revealed large mild to moderate defect in inferior wall that is fixed EF 61%.   .ECHO mild mitral valve regurgitation, mild tricuspid regurgitation, trace pulmonic regurgitation, severely dilated left atrium, left ventricular systolic function is normal. Pt. now referred for [facility] for further evaluation.   C risks, benefits and alternatives discussed with patient. Risk discussed included, but not limited to MI, stroke, mortality, major bleeding, arrythmia, or infection. Consent obtained and signed educational material provided. Pt. verbalizes and understands pre-procedural instructions.  ASA: II  Bleeding Risk Score: 1.9  Creatinine: 1.79  GFR: 39  Emeka Score: 12 points   Pt. pre-hydrated with 0.9% NS @ 250cc bolus IV x1

## 2022-12-06 ENCOUNTER — OUTPATIENT (OUTPATIENT)
Dept: OUTPATIENT SERVICES | Facility: HOSPITAL | Age: 77
LOS: 1 days | Discharge: HOME | End: 2022-12-06
Payer: MEDICARE

## 2022-12-06 VITALS
TEMPERATURE: 98 F | HEART RATE: 52 BPM | WEIGHT: 214.95 LBS | DIASTOLIC BLOOD PRESSURE: 66 MMHG | HEIGHT: 71 IN | RESPIRATION RATE: 16 BRPM | OXYGEN SATURATION: 100 % | SYSTOLIC BLOOD PRESSURE: 156 MMHG

## 2022-12-06 VITALS
RESPIRATION RATE: 16 BRPM | OXYGEN SATURATION: 98 % | SYSTOLIC BLOOD PRESSURE: 128 MMHG | HEART RATE: 64 BPM | DIASTOLIC BLOOD PRESSURE: 64 MMHG

## 2022-12-06 DIAGNOSIS — R06.02 SHORTNESS OF BREATH: ICD-10-CM

## 2022-12-06 DIAGNOSIS — Z98.890 OTHER SPECIFIED POSTPROCEDURAL STATES: Chronic | ICD-10-CM

## 2022-12-06 DIAGNOSIS — Z90.89 ACQUIRED ABSENCE OF OTHER ORGANS: Chronic | ICD-10-CM

## 2022-12-06 LAB
ESTIMATED AVERAGE GLUCOSE: 143 MG/DL
GLUCOSE BLDC GLUCOMTR-MCNC: 213 MG/DL — HIGH (ref 70–99)
HBA1C MFR BLD HPLC: 6.6 %

## 2022-12-06 PROCEDURE — C1769: CPT

## 2022-12-06 PROCEDURE — 82962 GLUCOSE BLOOD TEST: CPT

## 2022-12-06 PROCEDURE — C1894: CPT

## 2022-12-06 PROCEDURE — C1887: CPT

## 2022-12-06 PROCEDURE — 93458 L HRT ARTERY/VENTRICLE ANGIO: CPT

## 2022-12-06 PROCEDURE — 93458 L HRT ARTERY/VENTRICLE ANGIO: CPT | Mod: 26

## 2022-12-06 RX ORDER — LINAGLIPTIN 5 MG/1
1 TABLET, FILM COATED ORAL
Qty: 0 | Refills: 0 | DISCHARGE

## 2022-12-06 RX ORDER — ASPIRIN/CALCIUM CARB/MAGNESIUM 324 MG
1 TABLET ORAL
Qty: 0 | Refills: 0 | DISCHARGE

## 2022-12-06 RX ORDER — INSULIN ASPART 100 [IU]/ML
0 INJECTION, SOLUTION SUBCUTANEOUS
Qty: 0 | Refills: 0 | DISCHARGE

## 2022-12-06 RX ORDER — ATORVASTATIN CALCIUM 80 MG/1
1 TABLET, FILM COATED ORAL
Qty: 0 | Refills: 0 | DISCHARGE

## 2022-12-06 RX ORDER — SODIUM CHLORIDE 9 MG/ML
250 INJECTION INTRAMUSCULAR; INTRAVENOUS; SUBCUTANEOUS ONCE
Refills: 0 | Status: COMPLETED | OUTPATIENT
Start: 2022-12-06 | End: 2022-12-06

## 2022-12-06 RX ORDER — SITAGLIPTIN AND METFORMIN HYDROCHLORIDE 500; 50 MG/1; MG/1
1 TABLET, FILM COATED ORAL
Qty: 0 | Refills: 0 | DISCHARGE

## 2022-12-06 RX ORDER — FENOFIBRATE,MICRONIZED 130 MG
1 CAPSULE ORAL
Qty: 0 | Refills: 0 | DISCHARGE

## 2022-12-06 RX ORDER — INSULIN DETEMIR 100/ML (3)
40 INSULIN PEN (ML) SUBCUTANEOUS
Qty: 0 | Refills: 0 | DISCHARGE

## 2022-12-06 RX ORDER — FINASTERIDE 5 MG/1
1 TABLET, FILM COATED ORAL
Qty: 0 | Refills: 0 | DISCHARGE

## 2022-12-06 RX ORDER — TAMSULOSIN HYDROCHLORIDE 0.4 MG/1
2 CAPSULE ORAL
Qty: 0 | Refills: 0 | DISCHARGE

## 2022-12-06 RX ORDER — AMLODIPINE BESYLATE 2.5 MG/1
1 TABLET ORAL
Qty: 0 | Refills: 0 | DISCHARGE

## 2022-12-06 RX ORDER — DOCUSATE SODIUM 100 MG
1 CAPSULE ORAL
Qty: 0 | Refills: 0 | DISCHARGE

## 2022-12-06 RX ORDER — TAMSULOSIN HYDROCHLORIDE 0.4 MG/1
1 CAPSULE ORAL
Qty: 0 | Refills: 0 | DISCHARGE

## 2022-12-06 RX ORDER — BETHANECHOL CHLORIDE 25 MG
1 TABLET ORAL
Qty: 0 | Refills: 0 | DISCHARGE

## 2022-12-06 RX ORDER — CHOLECALCIFEROL (VITAMIN D3) 125 MCG
1 CAPSULE ORAL
Qty: 0 | Refills: 0 | DISCHARGE

## 2022-12-06 RX ORDER — SODIUM CHLORIDE 9 MG/ML
1000 INJECTION INTRAMUSCULAR; INTRAVENOUS; SUBCUTANEOUS
Refills: 0 | Status: DISCONTINUED | OUTPATIENT
Start: 2022-12-06 | End: 2022-12-06

## 2022-12-06 RX ORDER — EMPAGLIFLOZIN 10 MG/1
1 TABLET, FILM COATED ORAL
Qty: 0 | Refills: 0 | DISCHARGE

## 2022-12-06 RX ORDER — LOSARTAN/HYDROCHLOROTHIAZIDE 100MG-25MG
1 TABLET ORAL
Qty: 0 | Refills: 0 | DISCHARGE

## 2022-12-06 RX ADMIN — SODIUM CHLORIDE 500 MILLILITER(S): 9 INJECTION INTRAMUSCULAR; INTRAVENOUS; SUBCUTANEOUS at 09:48

## 2022-12-06 RX ADMIN — SODIUM CHLORIDE 250 MILLILITER(S): 9 INJECTION INTRAMUSCULAR; INTRAVENOUS; SUBCUTANEOUS at 12:04

## 2022-12-06 NOTE — PROGRESS NOTE ADULT - SUBJECTIVE AND OBJECTIVE BOX
Nurse Practitioner Progress note:     HPI:  77yr old male PMH HTN, HLD, T2DM, CKD, with c/o chest pain and SOB on exertion. Pt. had a nuclear stress test 8/21 which revealed large mild to moderate defect in inferior wall that is fixed EF 61%.   .ECHO mild mitral valve regurgitation, mild tricuspid regurgitation, trace pulmonic regurgitation, severely dilated left atrium, left ventricular systolic function is normal. Pt. now referred for [facility] for further evaluation.  (05 Dec 2022 15:54)        Vital Signs  T(C): 36.7 (12-06-22 @ 08:15), Max: 36.7 (12-06-22 @ 08:15)  HR: 52 (12-06-22 @ 08:15) (52 - 52)  BP: 156/66 (12-06-22 @ 08:15) (156/66 - 156/66)  RR: 16 (12-06-22 @ 08:15) (16 - 16)  SpO2: 100% (12-06-22 @ 08:15) (100% - 100%)  Wt(kg): --        PHYSICAL EXAM:  NEURO: Non-focal, AxOx3.  No neuro deficits   CHEST/LUNG: Clear to auscultation bilaterally; No wheeze  HEART: s1 s2 Regular rate and rhythm; No murmurs, rubs, or gallops  ABDOMEN: Soft, Nontender, Nondistended; Bowel sounds present X 4 quadrants   EXTREMITIES:  2+ Peripheral Pulses, No clubbing, cyanosis, or edema   VASCULAR: Peripheral pulses palpable 2+ bilaterally  PROCEDURE SITE: RRA accessed, hemoband to be removed 1 hour post placement. Site is without hematoma or bleeding. Sensation and PATRICE intact. Distal pulses palpable 2+, capillary refill < 2 seconds. Patient denies pain, numbness, tingling, CP or SOB. Clean dry dressing applied     PROCEDURE RESULTS: full report to follow     ASSESSMENT: HPI:  77yr old male PMH HTN, HLD, T2DM, CKD, with c/o chest pain and SOB on exertion. Pt. had a nuclear stress test 8/21 which revealed large mild to moderate defect in inferior wall that is fixed EF 61%.   .ECHO mild mitral valve regurgitation, mild tricuspid regurgitation, trace pulmonic regurgitation, severely dilated left atrium, left ventricular systolic function is normal. Pt. now referred for [facility] for further evaluation.  (05 Dec 2022 15:54)        PLAN:  -VS, diet, activity as per post cath orders  - IVF per SUZETTE protocol   -Encourage PO fluids  -Continue current medications  -Post cath instructions reviewed, patient verbalizes and understands instructions  -Discussed therapeutic lifestyle changes to reduce risk factors such as following a cardiac diet, weight loss, maintaining a healthy weight, exercise, smoking cessation, medication compliance, and regular follow-up  with PCP/Cardioloigst  - Patient to be discharged home, recommend following up with cardiologist in 2 weeks  -Plan of care discussed with patient, RN and Dr. Davidson

## 2022-12-06 NOTE — PACU DISCHARGE NOTE - COMMENTS
pt is s/p LHC via RRA with no intervention. pt is aaox4, denies pain, discomfort, palpitations, SOB, dizziness. RUE is warm and mobile w no s/sx bleeding or hematoma. discharge instructions reviewed w pt including medication reconciliation, follow up appts and post catheterization precautions. pt verbalizes understanding of information and provides teachback. all questions answered to pt satisfaction. IVL removed 20# from R AC. pt is pending transport to lobby to care of friend.

## 2022-12-13 DIAGNOSIS — I25.10 ATHEROSCLEROTIC HEART DISEASE OF NATIVE CORONARY ARTERY WITHOUT ANGINA PECTORIS: ICD-10-CM

## 2022-12-13 DIAGNOSIS — I10 ESSENTIAL (PRIMARY) HYPERTENSION: ICD-10-CM

## 2022-12-13 DIAGNOSIS — R94.39 ABNORMAL RESULT OF OTHER CARDIOVASCULAR FUNCTION STUDY: ICD-10-CM

## 2022-12-13 DIAGNOSIS — I21.4 NON-ST ELEVATION (NSTEMI) MYOCARDIAL INFARCTION: ICD-10-CM

## 2022-12-21 ENCOUNTER — NON-APPOINTMENT (OUTPATIENT)
Age: 77
End: 2022-12-21

## 2022-12-21 DIAGNOSIS — M70.60 TROCHANTERIC BURSITIS, UNSPECIFIED HIP: ICD-10-CM

## 2023-01-09 ENCOUNTER — APPOINTMENT (OUTPATIENT)
Dept: CARDIOLOGY | Facility: CLINIC | Age: 78
End: 2023-01-09
Payer: MEDICARE

## 2023-01-09 ENCOUNTER — NON-APPOINTMENT (OUTPATIENT)
Age: 78
End: 2023-01-09

## 2023-01-09 VITALS
OXYGEN SATURATION: 98 % | DIASTOLIC BLOOD PRESSURE: 52 MMHG | WEIGHT: 216 LBS | BODY MASS INDEX: 30.24 KG/M2 | SYSTOLIC BLOOD PRESSURE: 116 MMHG | HEART RATE: 53 BPM | HEIGHT: 71 IN

## 2023-01-09 VITALS
WEIGHT: 216 LBS | OXYGEN SATURATION: 98 % | TEMPERATURE: 98.2 F | DIASTOLIC BLOOD PRESSURE: 52 MMHG | RESPIRATION RATE: 15 BRPM | HEART RATE: 53 BPM | HEIGHT: 71 IN | BODY MASS INDEX: 30.24 KG/M2 | SYSTOLIC BLOOD PRESSURE: 116 MMHG

## 2023-01-09 PROCEDURE — 93000 ELECTROCARDIOGRAM COMPLETE: CPT

## 2023-01-09 PROCEDURE — 99214 OFFICE O/P EST MOD 30 MIN: CPT

## 2023-01-09 NOTE — ASSESSMENT
[FreeTextEntry1] : 77 year old male with above hx\par \par DAY :  stable  multifactorial  not improving with bronchodilator and allegra ; diastolic dysfunction ,   Normal LVEDP , had minimal coronary disease , normal EF ,  continue current medication \par \par controlled hypertension with hypertensive heart disease with abnormal EKG , continue  norvasc 10 mg po daily  \par  home BP reading , \par \par Sinus bradycardia chronic asymptomatic ,  had normal chronotropic response 8/2021 stress test  continue to monitor \par \par Dyslipidemia  low HDl  very  low LDL ,  continue atorvastatin to 10 mg po daily , continue fenfibrate 145 mg po daily \par \par CKD  : stable mild improved  creatinine ,  dehydration , encourage po fluid intake  . \par \par DM  Improved sugar levels  HB A1c 6.6   strict diet  exercise , \par \par follow up after  4 months

## 2023-01-09 NOTE — HISTORY OF PRESENT ILLNESS
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 who came for follow up says  he has chest congestion for last few days , started taking prednisone , antibiotics  , \par \par Patient denies any worsening of his baseline exertional shortness of breath , for which he had cardiac cath on Dec 6 th   showed mild diffuse disease of coronaries , Normal LVEDP \par \par \par Patient had echo showed LVH normal EF mild MR TR \par \par  patient also had left sided chest discomfort not related to above shortness of breath , non radiating ,mostly at rest , it occurs even activity , he feels discomfort  no shortness of breath ,  \par \par  patient blood sugars are fairly improved  , his blood pressure is controlled .\par \par patient does have resting sinus bradycardia ,Patient denies any dizziness or exertional fatigue , patient did have normal chronotropic response to exercise  ,\par \par \par colonoscopy 3 years ago and last week , was told to have benign polyps \par \par his blood work   HB a1c 6.6 ,    LDL63  HDL31  December 2022 ,  renal function showed stable  creatine , 1.79   normal troponin in the hospital , CXR showed no active disease \par \par \par \par \par

## 2023-01-09 NOTE — CARDIOLOGY SUMMARY
[de-identified] : 11/14/22    sinus bradycardia 40s firsrt degree AV block  non specific ST changes no significant change from prior  [de-identified] : 8/5/21 equivocal EKG changes resolved quickly  12 METS , 88%MPHR inferior wall fixed defect with normal motion  diaphragmatic attenuation ,  no ischemia , EF 61%  [de-identified] : 6/15/21   EF 61% Mild LVH :  mild aortic sclerosis, mild  mitral regurgitation and tricuspid regurgitation. Estimated right ventricular systolic pressure 46  mm hg.

## 2023-01-23 ENCOUNTER — APPOINTMENT (OUTPATIENT)
Dept: ENDOCRINOLOGY | Facility: CLINIC | Age: 78
End: 2023-01-23
Payer: MEDICARE

## 2023-03-29 ENCOUNTER — NON-APPOINTMENT (OUTPATIENT)
Age: 78
End: 2023-03-29

## 2023-03-29 RX ORDER — GLUCOSAMINE HCL 500 MG
75 MCG TABLET ORAL
Refills: 0 | Status: ACTIVE | COMMUNITY

## 2023-04-24 LAB
25(OH)D3 SERPL-MCNC: 35.1 NG/ML
ALBUMIN SERPL ELPH-MCNC: 4.3 G/DL
ALP BLD-CCNC: 52 U/L
ALT SERPL-CCNC: 20 U/L
ANION GAP SERPL CALC-SCNC: 16 MMOL/L
AST SERPL-CCNC: 26 U/L
BILIRUB SERPL-MCNC: 0.5 MG/DL
BUN SERPL-MCNC: 28 MG/DL
CA-I SERPL-SCNC: 5 MG/DL
CALCIUM SERPL-MCNC: 10.4 MG/DL
CALCIUM SERPL-MCNC: 10.4 MG/DL
CHLORIDE SERPL-SCNC: 104 MMOL/L
CHOLEST SERPL-MCNC: 129 MG/DL
CO2 SERPL-SCNC: 23 MMOL/L
CREAT SERPL-MCNC: 1.83 MG/DL
CREAT SPEC-SCNC: 131 MG/DL
EGFR: 37 ML/MIN/1.73M2
ESTIMATED AVERAGE GLUCOSE: 154 MG/DL
FRUCTOSAMINE SERPL-MCNC: 250 UMOL/L
GLUCOSE SERPL-MCNC: 111 MG/DL
GLYCOMARK.: 2.3 UG/ML
HBA1C MFR BLD HPLC: 7 %
HDLC SERPL-MCNC: 31 MG/DL
LDLC SERPL CALC-MCNC: 65 MG/DL
MAGNESIUM SERPL-MCNC: 2.3 MG/DL
MICROALBUMIN 24H UR DL<=1MG/L-MCNC: <1.2 MG/DL
MICROALBUMIN/CREAT 24H UR-RTO: NORMAL MG/G
NONHDLC SERPL-MCNC: 99 MG/DL
PARATHYROID HORMONE INTACT: 15 PG/ML
PHOSPHATE SERPL-MCNC: 3.6 MG/DL
POTASSIUM SERPL-SCNC: 4.3 MMOL/L
PROT SERPL-MCNC: 7.2 G/DL
SODIUM SERPL-SCNC: 142 MMOL/L
T3FREE SERPL-MCNC: 3.07 PG/ML
T4 FREE SERPL-MCNC: 1.4 NG/DL
TRIGL SERPL-MCNC: 167 MG/DL
TSH SERPL-ACNC: 1.57 UIU/ML

## 2023-05-01 ENCOUNTER — APPOINTMENT (OUTPATIENT)
Dept: ENDOCRINOLOGY | Facility: CLINIC | Age: 78
End: 2023-05-01
Payer: MEDICARE

## 2023-05-01 VITALS
BODY MASS INDEX: 30.66 KG/M2 | HEART RATE: 56 BPM | SYSTOLIC BLOOD PRESSURE: 116 MMHG | HEIGHT: 71 IN | OXYGEN SATURATION: 98 % | TEMPERATURE: 97.3 F | WEIGHT: 219 LBS | DIASTOLIC BLOOD PRESSURE: 44 MMHG

## 2023-05-01 LAB — GLUCOSE BLDC GLUCOMTR-MCNC: 241

## 2023-05-01 PROCEDURE — 99214 OFFICE O/P EST MOD 30 MIN: CPT | Mod: 25

## 2023-05-01 PROCEDURE — 95251 CONT GLUC MNTR ANALYSIS I&R: CPT

## 2023-05-01 NOTE — PHYSICAL EXAM
[Alert] : alert [Well Nourished] : well nourished [No Acute Distress] : no acute distress [Well Developed] : well developed [Normal Sclera/Conjunctiva] : normal sclera/conjunctiva [EOMI] : extra ocular movement intact [Normal Oropharynx] : the oropharynx was normal [No Proptosis] : no proptosis [Thyroid Not Enlarged] : the thyroid was not enlarged [No Thyroid Nodules] : no palpable thyroid nodules [No Respiratory Distress] : no respiratory distress [No Accessory Muscle Use] : no accessory muscle use [Clear to Auscultation] : lungs were clear to auscultation bilaterally [Normal Rate] : heart rate was normal [Normal S1, S2] : normal S1 and S2 [Regular Rhythm] : with a regular rhythm [No Edema] : no peripheral edema [Pedal Pulses Normal] : the pedal pulses are present [Normal Bowel Sounds] : normal bowel sounds [Not Tender] : non-tender [Not Distended] : not distended [Soft] : abdomen soft [Normal Anterior Cervical Nodes] : no anterior cervical lymphadenopathy [Normal Posterior Cervical Nodes] : no posterior cervical lymphadenopathy [No Spinal Tenderness] : no spinal tenderness [Spine Straight] : spine straight [Normal Gait] : normal gait [No Stigmata of Cushings Syndrome] : no stigmata of Cushings Syndrome [Normal Strength/Tone] : muscle strength and tone were normal [No Rash] : no rash [Normal Reflexes] : deep tendon reflexes were 2+ and symmetric [No Tremors] : no tremors [Oriented x3] : oriented to person, place, and time [Acanthosis Nigricans] : no acanthosis nigricans

## 2023-05-01 NOTE — HISTORY OF PRESENT ILLNESS
[FreeTextEntry1] : Mr. Dey is a 78 year old male who returns today in follow up with regard to a history of type 2 diabetes mellitus. \par \par  There is no known history of retinopathy or nephropathy. He too denies any history of neuropathy. \par \par had been having sob-December 20222 had cardiac cath at  [facility] with results said to be normal.-following with cardiologist Dr. Palla \par \par Current dm medications include Januvia 50 mg per day one tablet daily, Jardiance 10 mg po daily, Levemir about 38 units in the am and 38 units hs hs and novolog 38  units at most meals. Taking 4-5 injections per day. \par \par HGM has shown values  via Luz 2 has shown msot values 120's to 140;s with some values somewhat higher and some down to 65-mostly asymptomatic.\par \par He denies any chest pain, sob, neurologic or ophthalmologic complaints. He too denies any new podiatric concerns. He is up to date \par with his ophthalmologic visit.\par \par a1c returned at 7.0% on 4/21/23 \par \par  Additional diagnose include: hyperlipidemia Does have mild hypercalcemia which may be Non PTH mediated. Recent labs with calcium normal and pth low end at 10.\par \par Does follow with nephrologist Dr. Snowden-gina \par On Tamsulosin and now Bethanecol per urol Dr. Tate\par On losartan and amlodipine 10mg \par D3 1,000 iu daily\par Had covid vaccine \par  \par Had angiogram in December with Dr. Palla all clear with no stent placement \par

## 2023-05-02 RX ORDER — FLUTICASONE PROPIONATE AND SALMETEROL XINAFOATE 115; 21 UG/1; UG/1
115-21 AEROSOL, METERED RESPIRATORY (INHALATION)
Qty: 1 | Refills: 5 | Status: ACTIVE | COMMUNITY
Start: 2022-11-14 | End: 1900-01-01

## 2023-05-09 ENCOUNTER — RX RENEWAL (OUTPATIENT)
Age: 78
End: 2023-05-09

## 2023-05-09 ENCOUNTER — NON-APPOINTMENT (OUTPATIENT)
Age: 78
End: 2023-05-09

## 2023-05-09 ENCOUNTER — APPOINTMENT (OUTPATIENT)
Dept: CARDIOLOGY | Facility: CLINIC | Age: 78
End: 2023-05-09
Payer: MEDICARE

## 2023-05-09 VITALS
SYSTOLIC BLOOD PRESSURE: 126 MMHG | DIASTOLIC BLOOD PRESSURE: 44 MMHG | HEIGHT: 71 IN | HEART RATE: 61 BPM | BODY MASS INDEX: 31.22 KG/M2 | WEIGHT: 223 LBS | OXYGEN SATURATION: 95 %

## 2023-05-09 DIAGNOSIS — R06.02 SHORTNESS OF BREATH: ICD-10-CM

## 2023-05-09 DIAGNOSIS — E78.2 MIXED HYPERLIPIDEMIA: ICD-10-CM

## 2023-05-09 PROCEDURE — 93000 ELECTROCARDIOGRAM COMPLETE: CPT

## 2023-05-09 PROCEDURE — 99214 OFFICE O/P EST MOD 30 MIN: CPT

## 2023-05-09 RX ORDER — SITAGLIPTIN 50 MG/1
50 TABLET, FILM COATED ORAL
Refills: 0 | Status: DISCONTINUED | COMMUNITY
End: 2023-05-09

## 2023-05-09 RX ORDER — ALBUTEROL SULFATE 90 UG/1
108 (90 BASE) INHALANT RESPIRATORY (INHALATION)
Qty: 2 | Refills: 0 | Status: ACTIVE | COMMUNITY
Start: 2023-05-09 | End: 1900-01-01

## 2023-05-09 RX ORDER — FLASH GLUCOSE SCANNING READER
EACH MISCELLANEOUS
Qty: 1 | Refills: 0 | Status: DISCONTINUED | COMMUNITY
Start: 2022-07-01 | End: 2023-05-09

## 2023-05-09 RX ORDER — FENOFIBRATE 130 MG/1
130 CAPSULE ORAL DAILY
Refills: 0 | Status: DISCONTINUED | COMMUNITY
End: 2023-05-09

## 2023-05-09 RX ORDER — OMEPRAZOLE 20 MG/1
20 TABLET, DELAYED RELEASE ORAL DAILY
Refills: 0 | Status: DISCONTINUED | COMMUNITY
End: 2023-05-09

## 2023-05-09 RX ORDER — AMOXICILLIN AND CLAVULANATE POTASSIUM 875; 125 MG/1; MG/1
875-125 TABLET, COATED ORAL
Refills: 0 | Status: DISCONTINUED | COMMUNITY
End: 2023-05-09

## 2023-05-09 RX ORDER — METHYLPREDNISOLONE 4 MG/1
4 TABLET ORAL
Qty: 1 | Refills: 0 | Status: DISCONTINUED | COMMUNITY
End: 2023-05-09

## 2023-05-09 NOTE — ASSESSMENT
[FreeTextEntry1] : 77 year old male with above hx\par \par DAY :  stable  multifactorial  not improving with bronchodilator and allegra ; diastolic dysfunction ,   Normal LVEDP , had minimal coronary disease , normal EF ,  continue current medication  recommend pulmonary evaluation \par \par controlled hypertension with hypertensive heart disease with abnormal EKG , continue  norvasc 10 mg po daily  \par  home BP reading , \par \par Sinus bradycardia chronic asymptomatic ,  had normal chronotropic response 8/2021 stress test  continue to monitor \par \par Dyslipidemia  low HDl  very  low LDL ,  continue atorvastatin to 10 mg po daily , continue fenfibrate 145 mg po daily \par \par CKD  : stable mild improved  creatinine ,  dehydration , encourage po fluid intake  . \par \par DM  Improved sugar levels  HB A1c 6.6   strict diet  exercise , \par \par follow up after  3 months

## 2023-05-09 NOTE — CARDIOLOGY SUMMARY
[de-identified] : 5/9/23   sinus bradycardia 54  firsrt degree AV block  non specific ST changes no significant change from prior  [de-identified] : 8/5/21 equivocal EKG changes resolved quickly  12 METS , 88%MPHR inferior wall fixed defect with normal motion  diaphragmatic attenuation ,  no ischemia , EF 61%  [de-identified] : 11/17/22    EF 61% Mild LVH :  mild aortic sclerosis, mild  mitral regurgitation and tricuspid regurgitation. Estimated right ventricular systolic pressure 46  mm hg. normal RV function  [de-identified] : DEC 2022  MILD ATHEROSCLEROSIS OF CORONARIES , LVEDP 12

## 2023-05-09 NOTE — HISTORY OF PRESENT ILLNESS
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 who came for follow up says  he is having same episode of shortness of breath on exertion , that is not getting better , patient  has increased his weight as he was not adherent to diet restriction , \par \par Patient denies any worsening of his baseline exertional shortness of breath since he was complaint for many months , for which he had cardiac cath on Dec 6 th   showed mild diffuse disease of coronaries , Normal LVEDP \par \par Patient had echo showed LVH normal EF mild MR TR \par \par  patient blood sugars are fairly improved  , his blood pressure is controlled .\par \par patient does have resting sinus bradycardia ,Patient denies any dizziness or exertional fatigue , patient did have normal chronotropic response to exercise  ,\par \par \par colonoscopy 3 years ago and last week , was told to have benign polyps \par \par his blood work   HB a1c 6.6 ,    LDL 63  HDL31  December 2022 ,  renal function showed stable  creatine , 1.79   normal troponin in the hospital , CXR showed no active disease \par \par \par \par \par

## 2023-05-09 NOTE — REVIEW OF SYSTEMS
[Dyspnea on exertion] : dyspnea during exertion [Urinary Frequency] : urinary frequency [Negative] : Neurological [Earache] : no earache [Sore Throat] : no sore throat [SOB] : no shortness of breath [Chest Discomfort] : no chest discomfort [Palpitations] : no palpitations [Joint Pain] : no joint pain [FreeTextEntry8] : BPH

## 2023-06-08 ENCOUNTER — APPOINTMENT (OUTPATIENT)
Dept: ULTRASOUND IMAGING | Facility: CLINIC | Age: 78
End: 2023-06-08
Payer: MEDICARE

## 2023-06-08 ENCOUNTER — OUTPATIENT (OUTPATIENT)
Dept: OUTPATIENT SERVICES | Facility: HOSPITAL | Age: 78
LOS: 1 days | End: 2023-06-08
Payer: MEDICARE

## 2023-06-08 DIAGNOSIS — Z00.8 ENCOUNTER FOR OTHER GENERAL EXAMINATION: ICD-10-CM

## 2023-06-08 DIAGNOSIS — Z90.89 ACQUIRED ABSENCE OF OTHER ORGANS: Chronic | ICD-10-CM

## 2023-06-08 DIAGNOSIS — Z98.890 OTHER SPECIFIED POSTPROCEDURAL STATES: Chronic | ICD-10-CM

## 2023-06-08 PROCEDURE — 76700 US EXAM ABDOM COMPLETE: CPT

## 2023-06-08 PROCEDURE — 76700 US EXAM ABDOM COMPLETE: CPT | Mod: 26

## 2023-06-15 ENCOUNTER — APPOINTMENT (OUTPATIENT)
Dept: INTERNAL MEDICINE | Facility: CLINIC | Age: 78
End: 2023-06-15
Payer: MEDICARE

## 2023-06-15 VITALS
BODY MASS INDEX: 28.67 KG/M2 | OXYGEN SATURATION: 96 % | SYSTOLIC BLOOD PRESSURE: 125 MMHG | HEIGHT: 69.5 IN | HEART RATE: 62 BPM | RESPIRATION RATE: 16 BRPM | DIASTOLIC BLOOD PRESSURE: 61 MMHG | TEMPERATURE: 98 F | WEIGHT: 198 LBS

## 2023-06-15 PROCEDURE — 99204 OFFICE O/P NEW MOD 45 MIN: CPT | Mod: 25

## 2023-06-15 PROCEDURE — 94729 DIFFUSING CAPACITY: CPT

## 2023-06-15 PROCEDURE — 94727 GAS DIL/WSHOT DETER LNG VOL: CPT

## 2023-06-15 PROCEDURE — 94060 EVALUATION OF WHEEZING: CPT

## 2023-06-15 PROCEDURE — ZZZZZ: CPT

## 2023-06-15 NOTE — HISTORY OF PRESENT ILLNESS
[TextBox_4] : Mr. Dey is a 78-year-old male presents for initial pulmonary evaluation.  Patient is complaining of episodes of shortness of breath with exertion.  He was referred for evaluation by his cardiologist, Dr. Palla.  Mr. Dey states that he has been having shortness of breath for approximately 1 year.  Activities such as going up a flight of stairs or walking up an incline will cause shortness of breath.  He also gets that shortness of breath when doing yard work or moving furniture.  The symptoms will usually last for about 5 minutes and resolve with rest.  He did have some episodes of chest discomfort associated with the shortness of breath. Patient had a cardiac catheterization on 12/6/2022 which showed nonobstructive coronary disease.  A transthoracic echocardiogram in November 2022 showed normal left ventricular systolic function.  There was concentric left ventricular hypertrophy.  The left atrium was severely dilated.  There is no evidence of pulmonary hypertension.  Mr. Dey states that he was recently on vacation in [place] for 4 weeks.  During that time he would often walk 5 miles per day.  He had no significant shortness of breath, chest pain or palpitations.  He is a lifelong non-smoker.  He did him smoke cigars for approximately 1 year.

## 2023-06-15 NOTE — DISCUSSION/SUMMARY
[FreeTextEntry1] : Mr. Dey is a 78-year-old male presents for pulmonary evaluation.  He is complaining of shortness of breath with exertion.  Symptoms started approximately 1 year ago.  He has a history of childhood asthma but is currently not on metered-dose inhaler therapy.  The shortness of breath usually resolves with rest.  Patient had a cardiac catheterization which showed nonobstructive coronary disease.  Echocardiogram showed normal left ventricular systolic function.  Patient does have a markedly dilated left atrium with no evidence of mitral valve disease.  Complete pulmonary function test show no evidence of obstructive or restrictive lung disease.  Patient will not require metered-dose inhaler therapy.  He will follow-up with his cardiologist.  Chest x-ray from 12/22 was normal.  Patient does not require metered-dose inhaler therapy.  He will follow-up in this office on an as-needed basis.

## 2023-06-15 NOTE — PROCEDURE
[FreeTextEntry1] : Complete pulmonary function test were performed.\par FVC 3.65 L which is 94% predicted.  FEV1 2.78 L which is 96% predicted.\par FEV1/FVC ratio 76%.\par FEF 25/75% 2.17 L/s which is 105% predicted.  PEF 10.25 L/s which is 1 3 9% predicted.\par Postbronchodilator: FEV1 2.90 L which is 100% predicted.  PEF 11.24 L/s which is 153% predicted.  There is no significant bronchodilator response.\par TLC 5.25 L which is 75% predicted.  Diffusing capacity is 70%.

## 2023-07-24 ENCOUNTER — RX RENEWAL (OUTPATIENT)
Age: 78
End: 2023-07-24

## 2023-07-31 ENCOUNTER — APPOINTMENT (OUTPATIENT)
Dept: CARDIOLOGY | Facility: CLINIC | Age: 78
End: 2023-07-31
Payer: MEDICARE

## 2023-07-31 VITALS
HEIGHT: 69.5 IN | BODY MASS INDEX: 30.4 KG/M2 | OXYGEN SATURATION: 97 % | WEIGHT: 210 LBS | SYSTOLIC BLOOD PRESSURE: 112 MMHG | DIASTOLIC BLOOD PRESSURE: 56 MMHG | HEART RATE: 52 BPM

## 2023-07-31 PROCEDURE — 99214 OFFICE O/P EST MOD 30 MIN: CPT

## 2023-07-31 PROCEDURE — 93000 ELECTROCARDIOGRAM COMPLETE: CPT

## 2023-07-31 NOTE — CARDIOLOGY SUMMARY
[de-identified] : 7/31/23 marked sinus bradycardia 47 firsrt degree AV block  non specific ST changes no significant change from prior  [de-identified] : 8/5/21 equivocal EKG changes resolved quickly  12 METS , 88%MPHR inferior wall fixed defect with normal motion  diaphragmatic attenuation ,  no ischemia , EF 61%  [de-identified] : 11/17/22    EF 61% Mild LVH :  mild aortic sclerosis, mild  mitral regurgitation and tricuspid regurgitation. Estimated right ventricular systolic pressure 46  mm hg. normal RV function  [de-identified] : DEC 2022  MILD ATHEROSCLEROSIS OF CORONARIES , LVEDP 12

## 2023-07-31 NOTE — HISTORY OF PRESENT ILLNESS
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 who came for follow up says  he is having same episode of shortness of breath on exertion , that is not getting better , patient  has increased his weight as he was not adherent to diet restriction , patient was evaluated by pulmonary , patient did have transient dizziness when he got up quickly ,   Patient denies any worsening of his baseline exertional shortness of breath since he was complaint for many months , for which he had cardiac cath on Dec 6 th   showed mild diffuse disease of coronaries , Normal LVEDP   Patient had echo showed LVH normal EF mild MR TR    patient blood sugars are fairly improved  , his blood pressure is controlled .  patient does have resting sinus bradycardia ,Patient denies any dizziness or exertional fatigue , patient did have normal chronotropic response to exercise  ,   colonoscopy 3 years ago and last week , was told to have benign polyps   his blood work   HB a1c 6.6 ,    LDL 66   HDL27   july 2023  ,  renal function showed stable  creatine , 1.79   normal troponin in the hospital , CXR showed no active disease

## 2023-07-31 NOTE — ASSESSMENT
[FreeTextEntry1] : 77 year old male with above hx  DAY :  stable  multifactorial  not improving with bronchodilator and allegra ; diastolic dysfunction ,   Normal LVEDP , had minimal coronary disease , normal EF ,   sinus bradycardia  will obtain exercise stress to assess heart rate response to exercise   controlled hypertension with hypertensive heart disease with abnormal EKG , continue  norvasc 10 mg po daily    home BP reading ,   Sinus bradycardia chronic asymptomatic ,  had normal chronotropic response 8/2021 stress test  continue to monitor  will repeat stress test this time   Dyslipidemia  low HDl  very  low LDL ,  continue atorvastatin to 10 mg po daily , continue fenfibrate 145 mg po daily   CKD  : stable mild improved  creatinine ,  dehydration , encourage po fluid intake  .   DM  Improved sugar levels  HB A1c 6.6   strict diet  exercise ,   follow up after  3 months

## 2023-08-02 LAB
ALBUMIN SERPL ELPH-MCNC: 4.1 G/DL
ALP BLD-CCNC: 49 U/L
ALT SERPL-CCNC: 17 U/L
ANION GAP SERPL CALC-SCNC: 11 MMOL/L
AST SERPL-CCNC: 18 U/L
BILIRUB SERPL-MCNC: 0.6 MG/DL
BUN SERPL-MCNC: 29 MG/DL
CALCIUM SERPL-MCNC: 9.7 MG/DL
CHLORIDE SERPL-SCNC: 102 MMOL/L
CHOLEST SERPL-MCNC: 126 MG/DL
CK SERPL-CCNC: 61 U/L
CO2 SERPL-SCNC: 25 MMOL/L
CREAT SERPL-MCNC: 1.83 MG/DL
EGFR: 37 ML/MIN/1.73M2
ESTIMATED AVERAGE GLUCOSE: 143 MG/DL
GLUCOSE SERPL-MCNC: 172 MG/DL
HBA1C MFR BLD HPLC: 6.6 %
HDLC SERPL-MCNC: 27 MG/DL
LDLC SERPL CALC-MCNC: 66 MG/DL
NONHDLC SERPL-MCNC: 99 MG/DL
POTASSIUM SERPL-SCNC: 4.6 MMOL/L
PROT SERPL-MCNC: 6.9 G/DL
SODIUM SERPL-SCNC: 138 MMOL/L
TRIGL SERPL-MCNC: 196 MG/DL

## 2023-08-10 ENCOUNTER — RX RENEWAL (OUTPATIENT)
Age: 78
End: 2023-08-10

## 2023-08-15 ENCOUNTER — APPOINTMENT (OUTPATIENT)
Dept: UROLOGY | Facility: CLINIC | Age: 78
End: 2023-08-15
Payer: MEDICARE

## 2023-08-15 VITALS
HEIGHT: 71 IN | HEART RATE: 61 BPM | SYSTOLIC BLOOD PRESSURE: 116 MMHG | DIASTOLIC BLOOD PRESSURE: 65 MMHG | WEIGHT: 216 LBS | BODY MASS INDEX: 30.24 KG/M2 | OXYGEN SATURATION: 95 % | RESPIRATION RATE: 14 BRPM

## 2023-08-15 DIAGNOSIS — N52.9 MALE ERECTILE DYSFUNCTION, UNSPECIFIED: ICD-10-CM

## 2023-08-15 PROCEDURE — 99214 OFFICE O/P EST MOD 30 MIN: CPT

## 2023-08-15 RX ORDER — TADALAFIL 5 MG/1
5 TABLET ORAL
Qty: 30 | Refills: 11 | Status: ACTIVE | COMMUNITY
Start: 2023-08-15 | End: 1900-01-01

## 2023-08-15 NOTE — END OF VISIT
[FreeTextEntry3] : I called in Tadalafil 5mg for him and went over its use for him. I refilled his other medications. He will followup for a TRUSP in 1 month and will stop taking Bethanechol at this time.

## 2023-08-15 NOTE — ADDENDUM
[FreeTextEntry1] : I, Daniel Elizabeth, acted as a scribe on behalf of Dr. Nigel Tate 08/15/2023. All medical entries made by the scribe were at my, Dr. Nigel Tate's, direction and personally dictated by me on 08/15/2023. I have reviewed the chart and agree that the record accurately reflects my personal performance of the history, physical exam, assessment and plan. I have also personally directed, reviewed, and agreed with the chart.

## 2023-08-15 NOTE — HISTORY OF PRESENT ILLNESS
[FreeTextEntry1] : 78M presents today for a checkup and refills. He would like to stop taking his Bethanechol though he notes that it has been helpful. He would like to swap from Sildenafil to Tadalafil as he does not feel it has been as effective as he would like and notes headaches from it.

## 2023-08-16 LAB
APPEARANCE: CLEAR
BACTERIA: NEGATIVE /HPF
BILIRUBIN URINE: NEGATIVE
BLOOD URINE: NEGATIVE
CAST: 0 /LPF
COLOR: YELLOW
EPITHELIAL CELLS: 0 /HPF
GLUCOSE QUALITATIVE U: >=1000 MG/DL
KETONES URINE: NEGATIVE MG/DL
LEUKOCYTE ESTERASE URINE: NEGATIVE
MICROSCOPIC-UA: NORMAL
NITRITE URINE: NEGATIVE
PH URINE: 6
PROTEIN URINE: NEGATIVE MG/DL
PSA SERPL-MCNC: 0.69 NG/ML
RED BLOOD CELLS URINE: 0 /HPF
SPECIFIC GRAVITY URINE: 1.02
UROBILINOGEN URINE: 1 MG/DL
WHITE BLOOD CELLS URINE: 0 /HPF

## 2023-08-17 LAB — URINE CYTOLOGY: NORMAL

## 2023-08-18 ENCOUNTER — NON-APPOINTMENT (OUTPATIENT)
Age: 78
End: 2023-08-18

## 2023-10-01 PROBLEM — Z92.3 HISTORY OF RADIATION THERAPY: Status: RESOLVED | Noted: 2019-08-02 | Resolved: 2023-10-01

## 2023-10-10 ENCOUNTER — APPOINTMENT (OUTPATIENT)
Dept: UROLOGY | Facility: CLINIC | Age: 78
End: 2023-10-10
Payer: MEDICARE

## 2023-10-10 VITALS
RESPIRATION RATE: 14 BRPM | WEIGHT: 210 LBS | DIASTOLIC BLOOD PRESSURE: 64 MMHG | OXYGEN SATURATION: 97 % | HEART RATE: 60 BPM | BODY MASS INDEX: 29.4 KG/M2 | HEIGHT: 71 IN | SYSTOLIC BLOOD PRESSURE: 106 MMHG | TEMPERATURE: 97.6 F

## 2023-10-10 DIAGNOSIS — N40.0 BENIGN PROSTATIC HYPERPLASIA WITHOUT LOWER URINARY TRACT SYMPMS: ICD-10-CM

## 2023-10-10 PROCEDURE — 76857 US EXAM PELVIC LIMITED: CPT

## 2023-10-10 PROCEDURE — 99213 OFFICE O/P EST LOW 20 MIN: CPT | Mod: 25

## 2023-10-10 PROCEDURE — 76872 US TRANSRECTAL: CPT

## 2023-10-13 ENCOUNTER — RX RENEWAL (OUTPATIENT)
Age: 78
End: 2023-10-13

## 2023-10-17 ENCOUNTER — APPOINTMENT (OUTPATIENT)
Dept: CARDIOLOGY | Facility: CLINIC | Age: 78
End: 2023-10-17
Payer: MEDICARE

## 2023-10-17 PROCEDURE — 93015 CV STRESS TEST SUPVJ I&R: CPT

## 2023-10-17 RX ORDER — FINASTERIDE 5 MG/1
5 TABLET, FILM COATED ORAL
Qty: 90 | Refills: 3 | Status: ACTIVE | COMMUNITY
Start: 2018-12-07 | End: 1900-01-01

## 2023-10-17 RX ORDER — TAMSULOSIN HYDROCHLORIDE 0.4 MG/1
0.4 CAPSULE ORAL
Qty: 180 | Refills: 3 | Status: ACTIVE | COMMUNITY
Start: 2021-01-04 | End: 1900-01-01

## 2023-10-17 RX ORDER — PEN NEEDLE, DIABETIC 32GX 5/32"
32G X 4 MM NEEDLE, DISPOSABLE MISCELLANEOUS
Qty: 5 | Refills: 3 | Status: ACTIVE | COMMUNITY
Start: 2021-03-24 | End: 1900-01-01

## 2023-10-30 ENCOUNTER — NON-APPOINTMENT (OUTPATIENT)
Age: 78
End: 2023-10-30

## 2023-10-30 ENCOUNTER — APPOINTMENT (OUTPATIENT)
Dept: CARDIOLOGY | Facility: CLINIC | Age: 78
End: 2023-10-30
Payer: MEDICARE

## 2023-10-30 VITALS
BODY MASS INDEX: 30.94 KG/M2 | HEART RATE: 50 BPM | RESPIRATION RATE: 14 BRPM | OXYGEN SATURATION: 99 % | TEMPERATURE: 98 F | DIASTOLIC BLOOD PRESSURE: 50 MMHG | WEIGHT: 221 LBS | HEIGHT: 71 IN | SYSTOLIC BLOOD PRESSURE: 100 MMHG

## 2023-10-30 VITALS
DIASTOLIC BLOOD PRESSURE: 50 MMHG | HEIGHT: 71 IN | OXYGEN SATURATION: 99 % | SYSTOLIC BLOOD PRESSURE: 100 MMHG | HEART RATE: 49 BPM | BODY MASS INDEX: 30.94 KG/M2 | WEIGHT: 221 LBS

## 2023-10-30 PROCEDURE — 93000 ELECTROCARDIOGRAM COMPLETE: CPT

## 2023-10-30 PROCEDURE — 99214 OFFICE O/P EST MOD 30 MIN: CPT

## 2023-10-30 RX ORDER — SILDENAFIL 20 MG/1
20 TABLET ORAL
Refills: 0 | Status: DISCONTINUED | COMMUNITY
End: 2023-10-30

## 2023-10-30 RX ORDER — BETHANECHOL CHLORIDE 50 MG/1
50 TABLET ORAL
Qty: 180 | Refills: 0 | Status: DISCONTINUED | COMMUNITY
Start: 2022-04-25 | End: 2023-10-30

## 2023-10-31 ENCOUNTER — RX RENEWAL (OUTPATIENT)
Age: 78
End: 2023-10-31

## 2023-11-01 ENCOUNTER — APPOINTMENT (OUTPATIENT)
Dept: ENDOCRINOLOGY | Facility: CLINIC | Age: 78
End: 2023-11-01
Payer: MEDICARE

## 2023-11-01 VITALS
SYSTOLIC BLOOD PRESSURE: 105 MMHG | TEMPERATURE: 98 F | DIASTOLIC BLOOD PRESSURE: 70 MMHG | OXYGEN SATURATION: 96 % | HEIGHT: 71 IN | WEIGHT: 217 LBS | BODY MASS INDEX: 30.38 KG/M2 | HEART RATE: 56 BPM

## 2023-11-01 LAB
25(OH)D3 SERPL-MCNC: 30.9 NG/ML
ALBUMIN SERPL ELPH-MCNC: 3.9 G/DL
ALP BLD-CCNC: 61 U/L
ALT SERPL-CCNC: 24 U/L
ANION GAP SERPL CALC-SCNC: 11 MMOL/L
AST SERPL-CCNC: 24 U/L
BILIRUB SERPL-MCNC: 0.5 MG/DL
BUN SERPL-MCNC: 28 MG/DL
CA-I SERPL-SCNC: 5.1 MG/DL
CALCIUM SERPL-MCNC: 9.2 MG/DL
CALCIUM SERPL-MCNC: 9.2 MG/DL
CHLORIDE SERPL-SCNC: 106 MMOL/L
CHOLEST SERPL-MCNC: 112 MG/DL
CO2 SERPL-SCNC: 25 MMOL/L
CREAT SERPL-MCNC: 1.7 MG/DL
CREAT SPEC-SCNC: 89 MG/DL
EGFR: 41 ML/MIN/1.73M2
ESTIMATED AVERAGE GLUCOSE: 114 MG/DL
FRUCTOSAMINE SERPL-MCNC: 206 UMOL/L
GLUCOSE BLDC GLUCOMTR-MCNC: 133
GLUCOSE SERPL-MCNC: 123 MG/DL
GLYCOMARK.: 2.6 UG/ML
HBA1C MFR BLD HPLC: 5.6 %
HDLC SERPL-MCNC: 28 MG/DL
LDLC SERPL CALC-MCNC: 58 MG/DL
LDLC SERPL DIRECT ASSAY-MCNC: 56 MG/DL
MAGNESIUM SERPL-MCNC: 2 MG/DL
MICROALBUMIN 24H UR DL<=1MG/L-MCNC: <1.2 MG/DL
MICROALBUMIN/CREAT 24H UR-RTO: NORMAL MG/G
NONHDLC SERPL-MCNC: 84 MG/DL
PARATHYROID HORMONE INTACT: 14 PG/ML
PHOSPHATE SERPL-MCNC: 2.2 MG/DL
POTASSIUM SERPL-SCNC: 3.9 MMOL/L
PROT SERPL-MCNC: 6.4 G/DL
SODIUM SERPL-SCNC: 142 MMOL/L
T3FREE SERPL-MCNC: 2.73 PG/ML
T4 FREE SERPL-MCNC: 1.6 NG/DL
TRIGL SERPL-MCNC: 145 MG/DL
TSH SERPL-ACNC: 1.97 UIU/ML

## 2023-11-01 PROCEDURE — 82962 GLUCOSE BLOOD TEST: CPT

## 2023-11-01 PROCEDURE — 99214 OFFICE O/P EST MOD 30 MIN: CPT

## 2023-11-01 PROCEDURE — 95251 CONT GLUC MNTR ANALYSIS I&R: CPT

## 2023-11-01 RX ORDER — INSULIN ASPART 100 [IU]/ML
100 INJECTION, SOLUTION INTRAVENOUS; SUBCUTANEOUS
Qty: 8 | Refills: 3 | Status: ACTIVE | COMMUNITY
Start: 2019-05-28 | End: 1900-01-01

## 2023-11-28 ENCOUNTER — NON-APPOINTMENT (OUTPATIENT)
Age: 78
End: 2023-11-28

## 2023-12-01 ENCOUNTER — APPOINTMENT (OUTPATIENT)
Dept: FAMILY MEDICINE | Facility: CLINIC | Age: 78
End: 2023-12-01
Payer: MEDICARE

## 2023-12-01 VITALS
SYSTOLIC BLOOD PRESSURE: 100 MMHG | DIASTOLIC BLOOD PRESSURE: 60 MMHG | WEIGHT: 217 LBS | HEART RATE: 65 BPM | OXYGEN SATURATION: 98 % | BODY MASS INDEX: 30.38 KG/M2 | HEIGHT: 71 IN | TEMPERATURE: 97.3 F

## 2023-12-01 DIAGNOSIS — L12.9 PEMPHIGOID, UNSPECIFIED: ICD-10-CM

## 2023-12-01 PROCEDURE — 99213 OFFICE O/P EST LOW 20 MIN: CPT

## 2023-12-06 ENCOUNTER — APPOINTMENT (OUTPATIENT)
Age: 78
End: 2023-12-06
Payer: MEDICARE

## 2023-12-06 ENCOUNTER — TRANSCRIPTION ENCOUNTER (OUTPATIENT)
Age: 78
End: 2023-12-06

## 2023-12-06 LAB
BMZ BP180 IGG SERPL-ACNC: <2 RU/ML
BMZ BP230 IGG SERPL-ACNC: <2 RU/ML

## 2023-12-06 PROCEDURE — 99204 OFFICE O/P NEW MOD 45 MIN: CPT

## 2024-01-08 ENCOUNTER — NON-APPOINTMENT (OUTPATIENT)
Age: 79
End: 2024-01-08

## 2024-01-08 ENCOUNTER — APPOINTMENT (OUTPATIENT)
Dept: CARDIOLOGY | Facility: CLINIC | Age: 79
End: 2024-01-08
Payer: MEDICARE

## 2024-01-08 VITALS
HEART RATE: 57 BPM | SYSTOLIC BLOOD PRESSURE: 124 MMHG | WEIGHT: 219 LBS | OXYGEN SATURATION: 99 % | BODY MASS INDEX: 30.66 KG/M2 | HEIGHT: 71 IN | DIASTOLIC BLOOD PRESSURE: 50 MMHG

## 2024-01-08 DIAGNOSIS — R06.09 OTHER FORMS OF DYSPNEA: ICD-10-CM

## 2024-01-08 DIAGNOSIS — I25.10 ATHEROSCLEROTIC HEART DISEASE OF NATIVE CORONARY ARTERY W/OUT ANGINA PECTORIS: ICD-10-CM

## 2024-01-08 PROCEDURE — 93000 ELECTROCARDIOGRAM COMPLETE: CPT

## 2024-01-08 PROCEDURE — 99214 OFFICE O/P EST MOD 30 MIN: CPT

## 2024-01-08 RX ORDER — INSULIN DETEMIR 100 [IU]/ML
100 INJECTION, SOLUTION SUBCUTANEOUS
Qty: 5 | Refills: 2 | Status: DISCONTINUED | COMMUNITY
Start: 2019-03-19 | End: 2024-01-08

## 2024-01-08 NOTE — HISTORY OF PRESENT ILLNESS
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 , other wise he is feeling ok   he is having same episode of shortness of breath on exertion , that is same as before , patient had sedentary life style , patient weight slowly gaining weight   Patient denies any worsening of his baseline exertional shortness of breath since he was complaint for many months , for which he had cardiac cath on Dec 6 th   showed mild diffuse disease of coronaries , Normal LVEDP   patient had normal exercise stress with  normal heart rate response ,  denies any chest pain  or palpitation ,did gain weight , has sedentary life style   Patient had echo showed LVH normal EF mild MR TR    patient blood sugars are fairly improved  , his blood pressure is controlled . his lipids  HDL 28 ,  LDL 58 Tg 146 done oct 24 23   patient does have resting sinus bradycardia ,Patient denies any dizziness or exertional fatigue , patient did have normal chronotropic response to exercise  done 10/17/23 ,  his blood work   HB a1c 5.6 ,   ,  renal function showed stable  creatine , 1.79   normal troponin in the hospital , CXR showed no active disease

## 2024-01-08 NOTE — CARDIOLOGY SUMMARY
[de-identified] : 1/8/24   sinus bradycardia 53  firsrt degree AV block  non specific ST changes no significant change from prior  [de-identified] : 10/17/23  7 METS , 85%MPHR no ischemia ,  [de-identified] : 11/17/22    EF 61% Mild LVH :  mild aortic sclerosis, mild  mitral regurgitation and tricuspid regurgitation. Estimated right ventricular systolic pressure 46  mm hg. normal RV function  [de-identified] : DEC 2022  MILD ATHEROSCLEROSIS OF CORONARIES , LVEDP 12

## 2024-01-08 NOTE — DISCUSSION/SUMMARY
[FreeTextEntry1] : DAY : stable multifactorial not improving with bronchodilator and allegra ; likely decondition  diastolic dysfunction , Normal LVEDP , had minimal coronary disease , normal EF , sinus bradycardia  normal exercise heart rate response to exertion ,likely symptoms related to deconditioning, encourage to increase physical activity   Cardiac murmur : aortic sclerosis   controlled hypertension with hypertensive heart disease with abnormal EKG , continue norvasc 10 mg po daily  home BP reading ,  Sinus bradycardia chronic asymptomatic, had normal chronotropic response 10/2023  on stress test   Dyslipidemia low HDl  , LDL 56 , continue atorvastatin to 10 mg po daily , continue fenfibrate 145 mg po daily  CKD : stable mild improved creatinine , dehydration , encourage po fluid intake.  DM Improved sugar levels HB A1c 5.6 strict diet exercise ,follow up endocrinology   follow up after 3 months. [EKG obtained to assist in diagnosis and management of assessed problem(s)] : EKG obtained to assist in diagnosis and management of assessed problem(s)

## 2024-01-08 NOTE — REVIEW OF SYSTEMS
[Earache] : no earache [Sore Throat] : no sore throat [SOB] : no shortness of breath [Dyspnea on exertion] : dyspnea during exertion [Chest Discomfort] : no chest discomfort [Palpitations] : no palpitations [Urinary Frequency] : urinary frequency [Joint Pain] : no joint pain [Negative] : Neurological [FreeTextEntry8] : BPH

## 2024-01-11 ENCOUNTER — APPOINTMENT (OUTPATIENT)
Dept: FAMILY MEDICINE | Facility: CLINIC | Age: 79
End: 2024-01-11
Payer: MEDICARE

## 2024-01-11 VITALS
TEMPERATURE: 98.4 F | SYSTOLIC BLOOD PRESSURE: 132 MMHG | OXYGEN SATURATION: 96 % | HEART RATE: 68 BPM | DIASTOLIC BLOOD PRESSURE: 62 MMHG | BODY MASS INDEX: 30.54 KG/M2 | WEIGHT: 219 LBS

## 2024-01-11 DIAGNOSIS — F40.9 PHOBIC ANXIETY DISORDER, UNSPECIFIED: ICD-10-CM

## 2024-01-11 DIAGNOSIS — N18.30 CHRONIC KIDNEY DISEASE, STAGE 3 UNSPECIFIED: ICD-10-CM

## 2024-01-11 PROCEDURE — 99214 OFFICE O/P EST MOD 30 MIN: CPT

## 2024-01-11 NOTE — HISTORY OF PRESENT ILLNESS
[FreeTextEntry6] : pt is here for med rx for anxiety while being a passenger in car. Fine when he drives but when passenger gets very nervous. Will be traveling to [place] and cant drive at night.

## 2024-01-15 ENCOUNTER — RX RENEWAL (OUTPATIENT)
Age: 79
End: 2024-01-15

## 2024-01-31 RX ORDER — INSULIN GLARGINE 100 [IU]/ML
100 INJECTION, SOLUTION SUBCUTANEOUS
Qty: 5 | Refills: 1 | Status: ACTIVE | COMMUNITY
Start: 2024-01-31 | End: 1900-01-01

## 2024-01-31 RX ORDER — INSULIN DETEMIR 100 [IU]/ML
100 INJECTION, SOLUTION SUBCUTANEOUS
Qty: 5 | Refills: 2 | Status: DISCONTINUED | COMMUNITY
Start: 2023-10-31 | End: 2024-01-31

## 2024-04-03 ENCOUNTER — RX RENEWAL (OUTPATIENT)
Age: 79
End: 2024-04-03

## 2024-04-03 DIAGNOSIS — E83.52 HYPERCALCEMIA: ICD-10-CM

## 2024-04-03 RX ORDER — FENOFIBRATE 145 MG/1
145 TABLET, COATED ORAL DAILY
Qty: 90 | Refills: 1 | Status: ACTIVE | COMMUNITY
Start: 2022-05-03 | End: 1900-01-01

## 2024-04-03 RX ORDER — FENOFIBRATE 145 MG/1
145 TABLET, COATED ORAL
Qty: 90 | Refills: 1 | Status: ACTIVE | COMMUNITY
Start: 2024-04-03 | End: 1900-01-01

## 2024-04-05 RX ORDER — AMLODIPINE BESYLATE 10 MG/1
10 TABLET ORAL
Qty: 90 | Refills: 1 | Status: ACTIVE | COMMUNITY
Start: 1900-01-01 | End: 1900-01-01

## 2024-04-05 RX ORDER — LOSARTAN POTASSIUM AND HYDROCHLOROTHIAZIDE 12.5; 5 MG/1; MG/1
50-12.5 TABLET ORAL
Qty: 90 | Refills: 1 | Status: ACTIVE | COMMUNITY
Start: 2019-11-11 | End: 1900-01-01

## 2024-04-13 ENCOUNTER — RX RENEWAL (OUTPATIENT)
Age: 79
End: 2024-04-13

## 2024-04-16 LAB
25(OH)D3 SERPL-MCNC: 48 NG/ML
ALBUMIN SERPL ELPH-MCNC: 4.2 G/DL
ALP BLD-CCNC: 59 U/L
ALT SERPL-CCNC: 19 U/L
ANION GAP SERPL CALC-SCNC: 12 MMOL/L
AST SERPL-CCNC: 25 U/L
BILIRUB SERPL-MCNC: 0.5 MG/DL
BUN SERPL-MCNC: 22 MG/DL
CALCIUM SERPL-MCNC: 9.7 MG/DL
CALCIUM SERPL-MCNC: 9.7 MG/DL
CHLORIDE SERPL-SCNC: 102 MMOL/L
CHOLEST SERPL-MCNC: 130 MG/DL
CO2 SERPL-SCNC: 25 MMOL/L
CREAT SERPL-MCNC: 1.83 MG/DL
CREAT SPEC-SCNC: 106 MG/DL
EGFR: 37 ML/MIN/1.73M2
ESTIMATED AVERAGE GLUCOSE: 128 MG/DL
FRUCTOSAMINE SERPL-MCNC: 232 UMOL/L
GLUCOSE SERPL-MCNC: 153 MG/DL
GLYCOMARK.: 2.7 UG/ML
HBA1C MFR BLD HPLC: 6.1 %
HDLC SERPL-MCNC: 26 MG/DL
LDLC SERPL CALC-MCNC: 74 MG/DL
LDLC SERPL DIRECT ASSAY-MCNC: 67 MG/DL
MAGNESIUM SERPL-MCNC: 2.2 MG/DL
MICROALBUMIN 24H UR DL<=1MG/L-MCNC: <1.2 MG/DL
MICROALBUMIN/CREAT 24H UR-RTO: NORMAL MG/G
NONHDLC SERPL-MCNC: 104 MG/DL
PARATHYROID HORMONE INTACT: 15 PG/ML
PHOSPHATE SERPL-MCNC: 2.9 MG/DL
POTASSIUM SERPL-SCNC: 4.5 MMOL/L
PROT SERPL-MCNC: 6.8 G/DL
SODIUM SERPL-SCNC: 140 MMOL/L
T3FREE SERPL-MCNC: 2.87 PG/ML
T4 FREE SERPL-MCNC: 1.5 NG/DL
TRIGL SERPL-MCNC: 179 MG/DL
TSH SERPL-ACNC: 1.42 UIU/ML

## 2024-04-17 LAB — CA-I SERPL-SCNC: 5.3 MG/DL

## 2024-05-01 ENCOUNTER — RX RENEWAL (OUTPATIENT)
Age: 79
End: 2024-05-01

## 2024-05-01 ENCOUNTER — APPOINTMENT (OUTPATIENT)
Dept: ENDOCRINOLOGY | Facility: CLINIC | Age: 79
End: 2024-05-01
Payer: MEDICARE

## 2024-05-01 ENCOUNTER — APPOINTMENT (OUTPATIENT)
Age: 79
End: 2024-05-01
Payer: MEDICARE

## 2024-05-01 VITALS
HEIGHT: 71 IN | BODY MASS INDEX: 30.82 KG/M2 | HEART RATE: 78 BPM | WEIGHT: 220.13 LBS | OXYGEN SATURATION: 97 % | SYSTOLIC BLOOD PRESSURE: 123 MMHG | DIASTOLIC BLOOD PRESSURE: 80 MMHG

## 2024-05-01 DIAGNOSIS — I10 ESSENTIAL (PRIMARY) HYPERTENSION: ICD-10-CM

## 2024-05-01 DIAGNOSIS — E55.9 VITAMIN D DEFICIENCY, UNSPECIFIED: ICD-10-CM

## 2024-05-01 DIAGNOSIS — E66.9 OBESITY, UNSPECIFIED: ICD-10-CM

## 2024-05-01 PROCEDURE — 99214 OFFICE O/P EST MOD 30 MIN: CPT

## 2024-05-01 PROCEDURE — 99213 OFFICE O/P EST LOW 20 MIN: CPT

## 2024-05-01 PROCEDURE — 95251 CONT GLUC MNTR ANALYSIS I&R: CPT

## 2024-05-02 RX ORDER — FLASH GLUCOSE SENSOR
KIT MISCELLANEOUS
Qty: 6 | Refills: 3 | Status: ACTIVE | COMMUNITY
Start: 2022-07-01 | End: 1900-01-01

## 2024-05-06 ENCOUNTER — APPOINTMENT (OUTPATIENT)
Dept: CARDIOLOGY | Facility: CLINIC | Age: 79
End: 2024-05-06
Payer: MEDICARE

## 2024-05-06 ENCOUNTER — NON-APPOINTMENT (OUTPATIENT)
Age: 79
End: 2024-05-06

## 2024-05-06 VITALS
SYSTOLIC BLOOD PRESSURE: 124 MMHG | OXYGEN SATURATION: 97 % | HEART RATE: 66 BPM | DIASTOLIC BLOOD PRESSURE: 50 MMHG | HEIGHT: 71 IN | BODY MASS INDEX: 31.08 KG/M2 | WEIGHT: 222 LBS

## 2024-05-06 DIAGNOSIS — R94.31 ABNORMAL ELECTROCARDIOGRAM [ECG] [EKG]: ICD-10-CM

## 2024-05-06 DIAGNOSIS — Z01.810 ENCOUNTER FOR PREPROCEDURAL CARDIOVASCULAR EXAMINATION: ICD-10-CM

## 2024-05-06 DIAGNOSIS — E78.5 HYPERLIPIDEMIA, UNSPECIFIED: ICD-10-CM

## 2024-05-06 DIAGNOSIS — I11.9 HYPERTENSIVE HEART DISEASE W/OUT HEART FAILURE: ICD-10-CM

## 2024-05-06 DIAGNOSIS — R01.1 CARDIAC MURMUR, UNSPECIFIED: ICD-10-CM

## 2024-05-06 PROCEDURE — G2211 COMPLEX E/M VISIT ADD ON: CPT

## 2024-05-06 PROCEDURE — 93000 ELECTROCARDIOGRAM COMPLETE: CPT | Mod: NC

## 2024-05-06 PROCEDURE — 99214 OFFICE O/P EST MOD 30 MIN: CPT

## 2024-05-06 RX ORDER — ALPRAZOLAM 0.25 MG/1
0.25 TABLET ORAL
Qty: 10 | Refills: 0 | Status: DISCONTINUED | COMMUNITY
Start: 2024-01-11 | End: 2024-05-06

## 2024-05-06 NOTE — HISTORY OF PRESENT ILLNESS
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 his lipids  HDL 26 ,  LDL 74 Tg 179 done April 2024  Hb a1c 6.1   patient does have resting sinus bradycardia ,Patient denies any dizziness or exertional fatigue , patient did have normal chronotropic response to exercise  done 10/17/23 ,

## 2024-05-06 NOTE — DISCUSSION/SUMMARY
[FreeTextEntry1] : DAY : stable multifactorial not improving with bronchodilator and allegra ; likely decondition  diastolic dysfunction , Normal LVEDP , had minimal coronary disease , normal EF , sinus bradycardia  normal exercise heart rate response to exertion ,likely symptoms related to deconditioning, encourage to increase physical activity   Cardiac murmur : aortic sclerosis   controlled hypertension with hypertensive heart disease with abnormal EKG , continue norvasc 10 mg po daily  home BP reading ,  Sinus bradycardia chronic asymptomatic, had normal chronotropic response 10/2023  on stress test   Dyslipidemia low HDl  , LDL 74 , continue atorvastatin to 10 mg po daily , continue fenfibrate 145 mg po daily  CKD : stable mild improved creatinine , dehydration , encourage po fluid intake.  DM Improved sugar levels HB A1c 6.1  strict diet exercise ,follow up endocrinology   patient is overall optimal and stable for low risk cataract surgery ,   follow up after 3 months. [EKG obtained to assist in diagnosis and management of assessed problem(s)] : EKG obtained to assist in diagnosis and management of assessed problem(s)

## 2024-05-06 NOTE — CARDIOLOGY SUMMARY
[de-identified] : 5/6/24  sinus bradycardia 54  firsrt degree AV block  LAFB  non specific ST changes no significant change from prior  [de-identified] : 10/17/23  7 METS , 85%MPHR no ischemia ,  [de-identified] : 11/17/22    EF 61% Mild LVH :  mild aortic sclerosis, mild  mitral regurgitation and tricuspid regurgitation. Estimated right ventricular systolic pressure 46  mm hg. normal RV function  [de-identified] : DEC 2022  MILD ATHEROSCLEROSIS OF CORONARIES , LVEDP 12

## 2024-05-10 NOTE — ADDENDUM
[FreeTextEntry1] : The patient is pending cataract surgery on 5/22/24. The patient is felt to be clinically stable from an endocrine standpoint with no clear contraindications for the planned procedure. He has been given detailed instructions on how to take his diabetic medications in preparation for the procedure.   This note was written by Payam Nazario on 05/01/2024 acting as medical scribe for Dr. Marvin Balbuena. I, Dr. Marvin Balbuena, have read and attest that all the information, medical decision making and discharge instructions within are true and accurate.

## 2024-05-10 NOTE — HISTORY OF PRESENT ILLNESS
[FreeTextEntry1] : Mr. Dey is a 79-year-old male who returns today in follow up with regard to a history of type 2 diabetes mellitus. There is no known history of retinopathy. He too denies any history of neuropathy.  Does follow with nephrologist Dr. Snowden-latest Creatinine at 1.83 on 04/16/2024. On    Current dm medications include Tradjenta 5 mg per day one tablet daily, Jardiance 10 mg po daily, Lantus 28-34 units in AM and HS, and Novolog 28-34 units at most meals; taking 4-5 injections per day.   HGM has shown values He has been having episodes of low BGs overnight. He keeps glucose tabs under his pillow and takes one.   He denies any chest pain, sob, neurologic or ophthalmologic complaints. He too denies any new podiatric concerns. He is up to date with his ophthalmologic visit- no diabetic changes noted.   Labs from 4/16/24 shows A1c at 6.1%, LDL at 67, and vitamin D 25-OH at 48.  POCT glucose today at mg/dL.  ____________________________________________ - Currently following with an oral pathologist, Dr. Gomez, regarding worsening mucous pemphigoid. Has f/u today.  - Patient is pending cataract surgery on 5/22/24.   Additional medical history includes that of hyperlipidemia, HTN, vitamin D deficiency   Does have mild hypercalcemia which may be Non PTH mediated.   Additional Medications: Losartan, Amlodipine 10mg, Tamsulosin, Bethanechol per urol Dr. Tate, D3 3,000 iu daily

## 2024-05-28 RX ORDER — LINAGLIPTIN 5 MG/1
5 TABLET, FILM COATED ORAL
Qty: 90 | Refills: 1 | Status: ACTIVE | COMMUNITY
Start: 2021-01-20 | End: 1900-01-01

## 2024-05-28 RX ORDER — ATORVASTATIN CALCIUM 10 MG/1
10 TABLET, FILM COATED ORAL
Qty: 90 | Refills: 1 | Status: ACTIVE | COMMUNITY
Start: 2024-01-15 | End: 1900-01-01

## 2024-05-28 RX ORDER — EMPAGLIFLOZIN 10 MG/1
10 TABLET, FILM COATED ORAL
Qty: 90 | Refills: 1 | Status: ACTIVE | COMMUNITY
Start: 2024-01-15 | End: 1900-01-01

## 2024-05-30 ENCOUNTER — APPOINTMENT (OUTPATIENT)
Dept: NEPHROLOGY | Facility: CLINIC | Age: 79
End: 2024-05-30
Payer: MEDICARE

## 2024-05-30 VITALS
SYSTOLIC BLOOD PRESSURE: 118 MMHG | WEIGHT: 219 LBS | HEIGHT: 71 IN | HEART RATE: 53 BPM | OXYGEN SATURATION: 98 % | BODY MASS INDEX: 30.66 KG/M2 | DIASTOLIC BLOOD PRESSURE: 54 MMHG | TEMPERATURE: 95.7 F

## 2024-05-30 DIAGNOSIS — E11.9 TYPE 2 DIABETES MELLITUS W/OUT COMPLICATIONS: ICD-10-CM

## 2024-05-30 DIAGNOSIS — Z85.72 PERSONAL HISTORY OF NON-HODGKIN LYMPHOMAS: ICD-10-CM

## 2024-05-30 DIAGNOSIS — N18.9 CHRONIC KIDNEY DISEASE, UNSPECIFIED: ICD-10-CM

## 2024-05-30 PROCEDURE — 81003 URINALYSIS AUTO W/O SCOPE: CPT | Mod: QW

## 2024-05-30 PROCEDURE — 99213 OFFICE O/P EST LOW 20 MIN: CPT

## 2024-05-30 RX ORDER — DOXYCYCLINE HYCLATE 20 MG/1
20 TABLET ORAL TWICE DAILY
Refills: 0 | Status: DISCONTINUED | COMMUNITY
End: 2024-05-30

## 2024-06-09 LAB
BILIRUB UR QL STRIP: NEGATIVE
CLARITY UR: CLEAR
COLLECTION METHOD: NORMAL
GLUCOSE UR-MCNC: NORMAL
HCG UR QL: 0.2 EU/DL
HGB UR QL STRIP.AUTO: NEGATIVE
KETONES UR-MCNC: NEGATIVE
LEUKOCYTE ESTERASE UR QL STRIP: NEGATIVE
NITRITE UR QL STRIP: NEGATIVE
PH UR STRIP: 5.5
PROT UR STRIP-MCNC: NEGATIVE
SP GR UR STRIP: 1.01

## 2024-06-09 NOTE — PHYSICAL EXAM
[General Appearance - Alert] : alert [General Appearance - In No Acute Distress] : in no acute distress [Sclera] : the sclera and conjunctiva were normal [PERRL With Normal Accommodation] : pupils were equal in size, round, and reactive to light [Extraocular Movements] : extraocular movements were intact [Outer Ear] : the ears and nose were normal in appearance [Oropharynx] : the oropharynx was normal [Neck Appearance] : the appearance of the neck was normal [Neck Cervical Mass (___cm)] : no neck mass was observed [Jugular Venous Distention Increased] : there was no jugular-venous distention [Thyroid Diffuse Enlargement] : the thyroid was not enlarged [Thyroid Nodule] : there were no palpable thyroid nodules [Auscultation Breath Sounds / Voice Sounds] : lungs were clear to auscultation bilaterally [Heart Rate And Rhythm] : heart rate was normal and rhythm regular [Heart Sounds] : normal S1 and S2 [Heart Sounds Gallop] : no gallops [Murmurs] : no murmurs [Heart Sounds Pericardial Friction Rub] : no pericardial rub [FreeTextEntry1] : Heart rate 53 bpm asymptomatic [Bowel Sounds] : normal bowel sounds [Abdomen Soft] : soft [Abdomen Tenderness] : non-tender [] : no hepato-splenomegaly [Abdomen Mass (___ Cm)] : no abdominal mass palpated [Abnormal Walk] : normal gait [Musculoskeletal - Swelling] : no joint swelling seen [Nail Clubbing] : no clubbing  or cyanosis of the fingernails [Motor Tone] : muscle strength and tone were normal [Oriented To Time, Place, And Person] : oriented to person, place, and time [Impaired Insight] : insight and judgment were intact [Affect] : the affect was normal

## 2024-06-09 NOTE — ASSESSMENT
[FreeTextEntry1] : 79-year-old gentleman seen by me in the past for chronic kidney disease stage III.  The patient has baseline serum creatinine in the range of 1.7 to 1.8 mg/dL dating back at least to 2019 according to my previous labs.  Patient was asked to follow-up for his history of CKD.  No acute events going on at this time.  Patient is also followed up by endocrine His diabetes is stable recent follow-up  by endocrinology noted.  The patient has chronic asymptomatic bradycardia.  Medications reviewed.  Patient's urine analysis shows glucose greater than 1000 blood and protein is negative.  Patient will continue to take Jardiance 10 mg daily and losartan/hydrochlorothiazide 50/12.5 mg daily.  This will hopefully provide renal protection from his diabetes,  And CKD  Renal imaging ordered I will see the patient back again in 6 months

## 2024-06-09 NOTE — HISTORY OF PRESENT ILLNESS
[FreeTextEntry1] : 79-year-old gentleman seen by me in the past for chronic kidney disease stage III.  The patient has baseline serum creatinine in the range of 1.7 to 1.8 mg/dL dating back at least to 2019 according to my previous labs.  Patient was asked to follow-up for his history of CKD.  No acute events going on at this time.  Patient is also followed up by endocrine His diabetes is stable recent follow-up  by endocrinology noted.  The patient has chronic asymptomatic bradycardia.

## 2024-06-12 ENCOUNTER — OUTPATIENT (OUTPATIENT)
Dept: OUTPATIENT SERVICES | Facility: HOSPITAL | Age: 79
LOS: 1 days | End: 2024-06-12
Payer: MEDICARE

## 2024-06-12 ENCOUNTER — APPOINTMENT (OUTPATIENT)
Dept: ULTRASOUND IMAGING | Facility: CLINIC | Age: 79
End: 2024-06-12
Payer: MEDICARE

## 2024-06-12 DIAGNOSIS — Z90.89 ACQUIRED ABSENCE OF OTHER ORGANS: Chronic | ICD-10-CM

## 2024-06-12 DIAGNOSIS — N18.9 CHRONIC KIDNEY DISEASE, UNSPECIFIED: ICD-10-CM

## 2024-06-12 DIAGNOSIS — Z98.890 OTHER SPECIFIED POSTPROCEDURAL STATES: Chronic | ICD-10-CM

## 2024-06-12 PROCEDURE — 76700 US EXAM ABDOM COMPLETE: CPT

## 2024-06-12 PROCEDURE — 76700 US EXAM ABDOM COMPLETE: CPT | Mod: 26

## 2024-07-04 ENCOUNTER — RX RENEWAL (OUTPATIENT)
Age: 79
End: 2024-07-04

## 2024-08-12 ENCOUNTER — NON-APPOINTMENT (OUTPATIENT)
Age: 79
End: 2024-08-12

## 2024-08-12 ENCOUNTER — APPOINTMENT (OUTPATIENT)
Dept: CARDIOLOGY | Facility: CLINIC | Age: 79
End: 2024-08-12
Payer: MEDICARE

## 2024-08-12 VITALS
SYSTOLIC BLOOD PRESSURE: 122 MMHG | HEART RATE: 54 BPM | DIASTOLIC BLOOD PRESSURE: 58 MMHG | WEIGHT: 216 LBS | OXYGEN SATURATION: 97 % | BODY MASS INDEX: 30.13 KG/M2

## 2024-08-12 DIAGNOSIS — R01.1 CARDIAC MURMUR, UNSPECIFIED: ICD-10-CM

## 2024-08-12 DIAGNOSIS — E78.5 HYPERLIPIDEMIA, UNSPECIFIED: ICD-10-CM

## 2024-08-12 DIAGNOSIS — I11.9 HYPERTENSIVE HEART DISEASE W/OUT HEART FAILURE: ICD-10-CM

## 2024-08-12 DIAGNOSIS — I10 ESSENTIAL (PRIMARY) HYPERTENSION: ICD-10-CM

## 2024-08-12 DIAGNOSIS — E66.9 OBESITY, UNSPECIFIED: ICD-10-CM

## 2024-08-12 DIAGNOSIS — E78.2 MIXED HYPERLIPIDEMIA: ICD-10-CM

## 2024-08-12 DIAGNOSIS — E11.9 TYPE 2 DIABETES MELLITUS W/OUT COMPLICATIONS: ICD-10-CM

## 2024-08-12 PROCEDURE — G2211 COMPLEX E/M VISIT ADD ON: CPT

## 2024-08-12 PROCEDURE — 93000 ELECTROCARDIOGRAM COMPLETE: CPT

## 2024-08-12 PROCEDURE — 99214 OFFICE O/P EST MOD 30 MIN: CPT

## 2024-08-12 RX ORDER — MOXIFLOXACIN OPHTHALMIC 5 MG/ML
0.5 SOLUTION/ DROPS OPHTHALMIC
Qty: 3 | Refills: 0 | Status: ACTIVE | COMMUNITY
Start: 2024-05-16

## 2024-08-12 RX ORDER — PREDNISOLONE ACETATE 10 MG/ML
1 SUSPENSION/ DROPS OPHTHALMIC
Qty: 5 | Refills: 0 | Status: ACTIVE | COMMUNITY
Start: 2024-07-23

## 2024-08-12 NOTE — HISTORY OF PRESENT ILLNESS
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 his lipids  HDL 26 ,  LDL 74 Tg 179 done April 2024  Hb a1c 6.1   patient does have resting sinus bradycardia ,Patient denies any dizziness or exertional fatigue , patient did have normal chronotropic response to exercise  done 10/17/23 ,

## 2024-08-12 NOTE — DISCUSSION/SUMMARY
[FreeTextEntry1] : DAY : chronic   stable multifactorial  continue bronchodilator and allegra ; likely decondition  diastolic dysfunction , Normal LVEDP , had minimal coronary disease , normal EF , sinus bradycardia  normal exercise heart rate response to exertion ,likely symptoms related to deconditioning, encourage to increase physical activity   Cardiac murmur : aortic sclerosis   controlled hypertension with hypertensive heart disease with abnormal EKG , continue norvasc 10 mg po daily  home BP reading ,  Sinus bradycardia chronic asymptomatic, had normal chronotropic response 10/2023  on stress test   Dyslipidemia low HDl  , LDL 74 , continue atorvastatin to 10 mg po daily , continue fenfibrate 145 mg po daily  CKD : stable mild improved creatinine , dehydration , encourage po fluid intake.  DM Improved sugar levels HB A1c 6.1  strict diet exercise ,follow up endocrinology    follow up after 3 months. [EKG obtained to assist in diagnosis and management of assessed problem(s)] : EKG obtained to assist in diagnosis and management of assessed problem(s)

## 2024-08-12 NOTE — CARDIOLOGY SUMMARY
[de-identified] : 5/6/24  sinus bradycardia 54  firsrt degree AV block  LAFB  non specific ST changes no significant change from prior  [de-identified] : 10/17/23  7 METS , 85%MPHR no ischemia ,  [de-identified] : 11/17/22    EF 61% Mild LVH :  mild aortic sclerosis, mild  mitral regurgitation and tricuspid regurgitation. Estimated right ventricular systolic pressure 46  mm hg. normal RV function  [de-identified] : DEC 2022  MILD ATHEROSCLEROSIS OF CORONARIES , LVEDP 12

## 2024-08-13 NOTE — CONSULT NOTE ADULT - PROVIDER SPECIALTY LIST ADULT
Nephrology
Cardiology
Patient is a 67 year old, , male with PPHx of presumed alcohol use d/o, with no known past psychiatric admissions with a PMHx of with obesity, admitted to [facility] 1 month ago due to recent onset of jaundice and with a prolonged hospital and ICU course there due to newly diagnosed decompensated cirrhosis with acute on chronic liver failure (ACLF) with shock (resolved, but still on midodrine); FANY (on intermittent HD since 7/9); recurrent maroon stools and acute on chronic anemia necessitating intermittent PRBC transfusions (last on 8/8) and hypofibrinoginemia, with EGD (7/12) with small non-bleeding EV and a duodenal ulcer with a visible vessel; and waxing and waning hepatic encephalopathy. He was transferred to [facility] on 8/12/24 for evaluation for combined liver/kidney transplants (SLK). Patient seen by transplant psychiatry for psychosocial evaluation for transplant.    Patient on exam, was A/Ox1, somnolent, unable to fully participate in exam at this time to answer questions related to any psychiatric hx, hx of substance use or questions related to transplant. SIPAT pending improvement in mental status.      Plan  -SIPAT pending improvement in mental status   -C/W Melatonin 5mg PO HS for sleep architecture   -Serial PETH monitoring   -Will attempt to gauge extent of alcohol use, If patient meets criteria for AUD, he will benefit from engagement in program with MAT for AUD    -Transplant Psychiatry will continue to follow patient 
Patient is a 67 year old, , male with PPHx of presumed alcohol use d/o, with no known past psychiatric admissions with a PMHx of with obesity, admitted to  1 month ago due to recent onset of jaundice and with a prolonged hospital and ICU course there due to newly diagnosed decompensated cirrhosis with acute on chronic liver failure (ACLF) with shock (resolved, but still on midodrine); FANY (on intermittent HD since 7/9); recurrent maroon stools and acute on chronic anemia necessitating intermittent PRBC transfusions (last on 8/8) and hypofibrinoginemia, with EGD (7/12) with small non-bleeding EV and a duodenal ulcer with a visible vessel; and waxing and waning hepatic encephalopathy. 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 Patient seen by transplant psychiatry for psychosocial evaluation for transplant.    Patient on exam, was A/Ox1, somnolent, unable to fully participate in exam at this time to answer questions related to any psychiatric hx, hx of substance use or questions related to transplant. SIPAT pending improvement in mental status.      Plan  -SIPAT pending improvement in mental status   -C/W Melatonin 5mg PO HS for sleep architecture   -Serial PETH monitoring   -Frequent day/night reorientation recommended; limit use of sedative hypnotics which may worsen mental status.  -Will attempt to gauge extent of alcohol use, If patient meets criteria for AUD, he will benefit from engagement in program with MAT for AUD    -Transplant Psychiatry will continue to follow patient

## 2024-08-14 ENCOUNTER — APPOINTMENT (OUTPATIENT)
Age: 79
End: 2024-08-14
Payer: MEDICARE

## 2024-08-14 PROCEDURE — 99213 OFFICE O/P EST LOW 20 MIN: CPT

## 2024-08-16 ENCOUNTER — NON-APPOINTMENT (OUTPATIENT)
Age: 79
End: 2024-08-16

## 2024-08-21 ENCOUNTER — RX RENEWAL (OUTPATIENT)
Age: 79
End: 2024-08-21

## 2024-08-29 ENCOUNTER — RX RENEWAL (OUTPATIENT)
Age: 79
End: 2024-08-29

## 2024-09-16 NOTE — REVIEW OF SYSTEMS
Patient received to floor from ED on stretcher.  Assisted with transfer to bed.  Alert and oriented x 4.  20 gauge IV to left forearm.  CHG bath given.  Cardiac monitor in place.  Positioned in bed for comfort.  Call bell in reach.       [Urinary Frequency] : urinary frequency [Negative] : Neurological [Joint Pain] : no joint pain [FreeTextEntry8] : BPH

## 2024-09-18 DIAGNOSIS — E11.9 TYPE 2 DIABETES MELLITUS W/OUT COMPLICATIONS: ICD-10-CM

## 2024-09-24 LAB
25(OH)D3 SERPL-MCNC: 36.7 NG/ML
ALBUMIN SERPL ELPH-MCNC: 3.9 G/DL
ALP BLD-CCNC: 54 U/L
ALT SERPL-CCNC: 28 U/L
ANION GAP SERPL CALC-SCNC: 15 MMOL/L
APO B SERPL-MCNC: 75 MG/DL
AST SERPL-CCNC: 42 U/L
BASOPHILS # BLD AUTO: 0.03 K/UL
BASOPHILS NFR BLD AUTO: 0.5 %
BILIRUB SERPL-MCNC: 0.6 MG/DL
BUN SERPL-MCNC: 33 MG/DL
CALCIUM SERPL-MCNC: 9.2 MG/DL
CHLORIDE SERPL-SCNC: 101 MMOL/L
CHOLEST SERPL-MCNC: 122 MG/DL
CO2 SERPL-SCNC: 23 MMOL/L
CREAT SERPL-MCNC: 2.02 MG/DL
EGFR: 33 ML/MIN/1.73M2
EOSINOPHIL # BLD AUTO: 0.05 K/UL
EOSINOPHIL NFR BLD AUTO: 0.8 %
ESTIMATED AVERAGE GLUCOSE: 140 MG/DL
FERRITIN SERPL-MCNC: 97 NG/ML
FOLATE SERPL-MCNC: 10 NG/ML
FRUCTOSAMINE SERPL-MCNC: 230 UMOL/L
GLUCOSE SERPL-MCNC: 178 MG/DL
GLYCOMARK.: 4.3 UG/ML
HBA1C MFR BLD HPLC: 6.5 %
HCT VFR BLD CALC: 39.1 %
HCYS SERPL-MCNC: 26.7 UMOL/L
HDLC SERPL-MCNC: 22 MG/DL
HGB BLD-MCNC: 12.6 G/DL
IMM GRANULOCYTES NFR BLD AUTO: 0.9 %
IRON SERPL-MCNC: 84 UG/DL
LDLC SERPL CALC-MCNC: 56 MG/DL
LYMPHOCYTES # BLD AUTO: 0.91 K/UL
LYMPHOCYTES NFR BLD AUTO: 14 %
MAGNESIUM SERPL-MCNC: 2.3 MG/DL
MAN DIFF?: NORMAL
MCHC RBC-ENTMCNC: 30.9 PG
MCHC RBC-ENTMCNC: 32.2 GM/DL
MCV RBC AUTO: 95.8 FL
MONOCYTES # BLD AUTO: 0.83 K/UL
MONOCYTES NFR BLD AUTO: 12.8 %
NEUTROPHILS # BLD AUTO: 4.6 K/UL
NEUTROPHILS NFR BLD AUTO: 71 %
NONHDLC SERPL-MCNC: 100 MG/DL
PLATELET # BLD AUTO: 289 K/UL
POTASSIUM SERPL-SCNC: 4.3 MMOL/L
PROT SERPL-MCNC: 6.3 G/DL
RBC # BLD: 4.08 M/UL
RBC # FLD: 13.8 %
SODIUM SERPL-SCNC: 139 MMOL/L
T3FREE SERPL-MCNC: 2.6 PG/ML
T4 FREE SERPL-MCNC: 1.3 NG/DL
TRIGL SERPL-MCNC: 278 MG/DL
TSH SERPL-ACNC: 1.28 UIU/ML
VIT B12 SERPL-MCNC: 328 PG/ML
WBC # FLD AUTO: 6.48 K/UL

## 2024-09-25 LAB
CREAT SPEC-SCNC: 91 MG/DL
MICROALBUMIN 24H UR DL<=1MG/L-MCNC: <1.2 MG/DL
MICROALBUMIN/CREAT 24H UR-RTO: NORMAL MG/G

## 2024-09-30 ENCOUNTER — RX RENEWAL (OUTPATIENT)
Age: 79
End: 2024-09-30

## 2024-10-01 ENCOUNTER — APPOINTMENT (OUTPATIENT)
Dept: ENDOCRINOLOGY | Facility: CLINIC | Age: 79
End: 2024-10-01
Payer: MEDICARE

## 2024-10-01 VITALS
HEART RATE: 96 BPM | OXYGEN SATURATION: 95 % | DIASTOLIC BLOOD PRESSURE: 60 MMHG | SYSTOLIC BLOOD PRESSURE: 100 MMHG | WEIGHT: 225.5 LBS | HEIGHT: 71 IN | BODY MASS INDEX: 31.57 KG/M2 | TEMPERATURE: 98.5 F

## 2024-10-01 DIAGNOSIS — E55.9 VITAMIN D DEFICIENCY, UNSPECIFIED: ICD-10-CM

## 2024-10-01 DIAGNOSIS — E66.9 OBESITY, UNSPECIFIED: ICD-10-CM

## 2024-10-01 DIAGNOSIS — E78.5 HYPERLIPIDEMIA, UNSPECIFIED: ICD-10-CM

## 2024-10-01 DIAGNOSIS — I10 ESSENTIAL (PRIMARY) HYPERTENSION: ICD-10-CM

## 2024-10-01 DIAGNOSIS — E11.9 TYPE 2 DIABETES MELLITUS W/OUT COMPLICATIONS: ICD-10-CM

## 2024-10-01 LAB — GLUCOSE BLDC GLUCOMTR-MCNC: 153

## 2024-10-01 PROCEDURE — 95251 CONT GLUC MNTR ANALYSIS I&R: CPT

## 2024-10-01 PROCEDURE — 99214 OFFICE O/P EST MOD 30 MIN: CPT

## 2024-10-01 PROCEDURE — G2211 COMPLEX E/M VISIT ADD ON: CPT

## 2024-10-01 NOTE — ADDENDUM
[FreeTextEntry1] : POCT glucose testing was carried out today given diabetes diagnosis.  This note was written by Payam Nazario on 10/01/2024 acting as medical scribe for Dr. Marvin Balbuena. I, Dr. Marvin Balbuena, have read and attest that all the information, medical decision making and discharge instructions within are true and accurate.

## 2024-10-01 NOTE — HISTORY OF PRESENT ILLNESS
[FreeTextEntry1] : Mr. Dey is a 79-year-old male who returns today in follow up with regard to a history of type 2 diabetes mellitus. There is no known history of retinopathy. He too denies any history of neuropathy.  Does follow with nephrologist Dr. Snowden- latest Creatinine at 2.03 on 09/24/2024. Has upcoming f/u appt on 11/01/24.   Currently following with an oral pathologist, Dr. Gomez, regarding worsening mucous pemphigoid. Has been on Prednisone 10mg daily with significant improvement. Did decrease to QOD, but had no improvement thus increased back to taking daily.   Current dm medications include Tradjenta 5mg per day one tablet daily, Jardiance 10 mg po daily, Lantus 38 units BiD (increased from 28 due to BGs in 200s), and Novolog 30-38 units at most meals; taking 4-5 injections per day.   Home glucose monitoring via has shown values of late to be averaging 146 with a variability of 32%. There are meal spikes to 200s. There are BGs down to 50s overnight.   Recent A1C returned at 6.5% on labs 09/24/2024. Was previously 6.1% in April 2024.  POCT glucose today at 153 mg/dL.  He denies any chest pain, sob, neurologic or ophthalmologic complaints. He too denies any new podiatric concerns. He is up to date with his ophthalmologic visit- no diabetic changes noted but is s/p cataract surgery on 5/22/24.  ____________________________________________________________________________________________________  Additional medical history includes that of hyperlipidemia, HTN, vitamin D deficiency   Does have mild hypercalcemia which may be Non PTH mediated. Denies any hx of kidney stones.  Calcium level has been stable wnl on bloodwork with latest level at 9.2 on 09/24/24.   Additional Medications: Losartan, Amlodipine 10mg, Tamsulosin, Bethanechol per urol Dr. Tate Supplements: vitamin D3 3,000 iu daily

## 2024-10-23 ENCOUNTER — APPOINTMENT (OUTPATIENT)
Age: 79
End: 2024-10-23
Payer: MEDICARE

## 2024-10-23 PROCEDURE — 99213 OFFICE O/P EST LOW 20 MIN: CPT

## 2024-11-04 ENCOUNTER — NON-APPOINTMENT (OUTPATIENT)
Age: 79
End: 2024-11-04

## 2024-11-04 ENCOUNTER — APPOINTMENT (OUTPATIENT)
Dept: CARDIOLOGY | Facility: CLINIC | Age: 79
End: 2024-11-04
Payer: MEDICARE

## 2024-11-04 VITALS
SYSTOLIC BLOOD PRESSURE: 110 MMHG | HEART RATE: 60 BPM | BODY MASS INDEX: 31.92 KG/M2 | OXYGEN SATURATION: 96 % | HEIGHT: 71 IN | WEIGHT: 228 LBS | DIASTOLIC BLOOD PRESSURE: 50 MMHG

## 2024-11-04 DIAGNOSIS — R01.1 CARDIAC MURMUR, UNSPECIFIED: ICD-10-CM

## 2024-11-04 DIAGNOSIS — I25.10 ATHEROSCLEROTIC HEART DISEASE OF NATIVE CORONARY ARTERY W/OUT ANGINA PECTORIS: ICD-10-CM

## 2024-11-04 DIAGNOSIS — I11.9 HYPERTENSIVE HEART DISEASE W/OUT HEART FAILURE: ICD-10-CM

## 2024-11-04 DIAGNOSIS — R94.31 ABNORMAL ELECTROCARDIOGRAM [ECG] [EKG]: ICD-10-CM

## 2024-11-04 DIAGNOSIS — R06.02 SHORTNESS OF BREATH: ICD-10-CM

## 2024-11-04 DIAGNOSIS — E78.2 MIXED HYPERLIPIDEMIA: ICD-10-CM

## 2024-11-04 DIAGNOSIS — E11.9 TYPE 2 DIABETES MELLITUS W/OUT COMPLICATIONS: ICD-10-CM

## 2024-11-04 PROCEDURE — 99214 OFFICE O/P EST MOD 30 MIN: CPT

## 2024-11-04 PROCEDURE — G2211 COMPLEX E/M VISIT ADD ON: CPT

## 2024-11-04 PROCEDURE — 93000 ELECTROCARDIOGRAM COMPLETE: CPT

## 2024-11-04 RX ORDER — MYCOPHENOLATE MOFETIL 500 MG/1
500 TABLET ORAL TWICE DAILY
Refills: 0 | Status: ACTIVE | COMMUNITY

## 2024-11-07 ENCOUNTER — RESULT REVIEW (OUTPATIENT)
Age: 79
End: 2024-11-07

## 2024-11-07 ENCOUNTER — OUTPATIENT (OUTPATIENT)
Dept: OUTPATIENT SERVICES | Facility: HOSPITAL | Age: 79
LOS: 1 days | End: 2024-11-07
Payer: MEDICARE

## 2024-11-07 ENCOUNTER — APPOINTMENT (OUTPATIENT)
Dept: CT IMAGING | Facility: CLINIC | Age: 79
End: 2024-11-07
Payer: MEDICARE

## 2024-11-07 ENCOUNTER — APPOINTMENT (OUTPATIENT)
Dept: NEPHROLOGY | Facility: CLINIC | Age: 79
End: 2024-11-07
Payer: MEDICARE

## 2024-11-07 VITALS
HEART RATE: 57 BPM | WEIGHT: 227 LBS | BODY MASS INDEX: 31.78 KG/M2 | HEIGHT: 71 IN | DIASTOLIC BLOOD PRESSURE: 60 MMHG | SYSTOLIC BLOOD PRESSURE: 120 MMHG | OXYGEN SATURATION: 98 % | TEMPERATURE: 95.5 F

## 2024-11-07 DIAGNOSIS — R10.9 UNSPECIFIED ABDOMINAL PAIN: ICD-10-CM

## 2024-11-07 DIAGNOSIS — Z98.890 OTHER SPECIFIED POSTPROCEDURAL STATES: Chronic | ICD-10-CM

## 2024-11-07 DIAGNOSIS — Z90.89 ACQUIRED ABSENCE OF OTHER ORGANS: Chronic | ICD-10-CM

## 2024-11-07 PROCEDURE — 81003 URINALYSIS AUTO W/O SCOPE: CPT | Mod: QW

## 2024-11-07 PROCEDURE — 74176 CT ABD & PELVIS W/O CONTRAST: CPT | Mod: 26,MH

## 2024-11-07 PROCEDURE — 99214 OFFICE O/P EST MOD 30 MIN: CPT

## 2024-11-07 PROCEDURE — G2211 COMPLEX E/M VISIT ADD ON: CPT

## 2024-11-20 PROCEDURE — 74176 CT ABD & PELVIS W/O CONTRAST: CPT

## 2024-12-11 ENCOUNTER — RX RENEWAL (OUTPATIENT)
Age: 79
End: 2024-12-11

## 2024-12-12 ENCOUNTER — RX RENEWAL (OUTPATIENT)
Age: 79
End: 2024-12-12

## 2024-12-14 ENCOUNTER — NON-APPOINTMENT (OUTPATIENT)
Age: 79
End: 2024-12-14

## 2025-01-10 ENCOUNTER — APPOINTMENT (OUTPATIENT)
Dept: CARDIOLOGY | Facility: CLINIC | Age: 80
End: 2025-01-10

## 2025-01-27 ENCOUNTER — APPOINTMENT (OUTPATIENT)
Dept: UROLOGY | Facility: CLINIC | Age: 80
End: 2025-01-27
Payer: MEDICARE

## 2025-01-27 VITALS
HEIGHT: 71 IN | OXYGEN SATURATION: 96 % | BODY MASS INDEX: 31.5 KG/M2 | HEART RATE: 60 BPM | DIASTOLIC BLOOD PRESSURE: 70 MMHG | RESPIRATION RATE: 16 BRPM | SYSTOLIC BLOOD PRESSURE: 139 MMHG | WEIGHT: 225 LBS

## 2025-01-27 DIAGNOSIS — R35.1 NOCTURIA: ICD-10-CM

## 2025-01-27 DIAGNOSIS — N40.0 BENIGN PROSTATIC HYPERPLASIA WITHOUT LOWER URINARY TRACT SYMPMS: ICD-10-CM

## 2025-01-27 DIAGNOSIS — R39.9 UNSPECIFIED SYMPTOMS AND SIGNS INVOLVING THE GENITOURINARY SYSTEM: ICD-10-CM

## 2025-01-27 DIAGNOSIS — Z12.5 ENCOUNTER FOR SCREENING FOR MALIGNANT NEOPLASM OF PROSTATE: ICD-10-CM

## 2025-01-27 PROCEDURE — 99214 OFFICE O/P EST MOD 30 MIN: CPT

## 2025-01-28 LAB
ANION GAP SERPL CALC-SCNC: 15 MMOL/L
APPEARANCE: CLEAR
BACTERIA: NEGATIVE /HPF
BILIRUBIN URINE: NEGATIVE
BLOOD URINE: NEGATIVE
BUN SERPL-MCNC: 28 MG/DL
CALCIUM SERPL-MCNC: 9.8 MG/DL
CAST: 0 /LPF
CHLORIDE SERPL-SCNC: 100 MMOL/L
CO2 SERPL-SCNC: 24 MMOL/L
COLOR: YELLOW
CREAT SERPL-MCNC: 1.85 MG/DL
EGFR: 37 ML/MIN/1.73M2
EPITHELIAL CELLS: 0 /HPF
GLUCOSE QUALITATIVE U: >=1000 MG/DL
GLUCOSE SERPL-MCNC: 180 MG/DL
KETONES URINE: NEGATIVE MG/DL
LEUKOCYTE ESTERASE URINE: NEGATIVE
MICROSCOPIC-UA: NORMAL
NITRITE URINE: NEGATIVE
PH URINE: 5.5
POTASSIUM SERPL-SCNC: 4.5 MMOL/L
PROTEIN URINE: NEGATIVE MG/DL
PSA SERPL-MCNC: 0.5 NG/ML
PSA SERPL-MCNC: 0.51 NG/ML
RED BLOOD CELLS URINE: 1 /HPF
SODIUM SERPL-SCNC: 139 MMOL/L
SPECIFIC GRAVITY URINE: >1.03
UROBILINOGEN URINE: 0.2 MG/DL
WHITE BLOOD CELLS URINE: 0 /HPF

## 2025-01-29 ENCOUNTER — NON-APPOINTMENT (OUTPATIENT)
Age: 80
End: 2025-01-29

## 2025-01-29 LAB
BACTERIA UR CULT: NORMAL
URINE CYTOLOGY: NORMAL

## 2025-02-04 ENCOUNTER — NON-APPOINTMENT (OUTPATIENT)
Age: 80
End: 2025-02-04

## 2025-02-04 ENCOUNTER — APPOINTMENT (OUTPATIENT)
Dept: CARDIOLOGY | Facility: CLINIC | Age: 80
End: 2025-02-04
Payer: MEDICARE

## 2025-02-04 VITALS
OXYGEN SATURATION: 97 % | SYSTOLIC BLOOD PRESSURE: 120 MMHG | WEIGHT: 224 LBS | BODY MASS INDEX: 31.36 KG/M2 | HEIGHT: 71 IN | DIASTOLIC BLOOD PRESSURE: 56 MMHG | HEART RATE: 60 BPM

## 2025-02-04 DIAGNOSIS — R94.31 ABNORMAL ELECTROCARDIOGRAM [ECG] [EKG]: ICD-10-CM

## 2025-02-04 DIAGNOSIS — I11.9 HYPERTENSIVE HEART DISEASE W/OUT HEART FAILURE: ICD-10-CM

## 2025-02-04 DIAGNOSIS — I25.10 ATHEROSCLEROTIC HEART DISEASE OF NATIVE CORONARY ARTERY W/OUT ANGINA PECTORIS: ICD-10-CM

## 2025-02-04 DIAGNOSIS — E78.2 MIXED HYPERLIPIDEMIA: ICD-10-CM

## 2025-02-04 DIAGNOSIS — R06.09 OTHER FORMS OF DYSPNEA: ICD-10-CM

## 2025-02-04 DIAGNOSIS — R01.1 CARDIAC MURMUR, UNSPECIFIED: ICD-10-CM

## 2025-02-04 PROCEDURE — G2211 COMPLEX E/M VISIT ADD ON: CPT

## 2025-02-04 PROCEDURE — 93306 TTE W/DOPPLER COMPLETE: CPT

## 2025-02-04 PROCEDURE — 93000 ELECTROCARDIOGRAM COMPLETE: CPT

## 2025-02-04 PROCEDURE — 99214 OFFICE O/P EST MOD 30 MIN: CPT

## 2025-02-17 ENCOUNTER — RX RENEWAL (OUTPATIENT)
Age: 80
End: 2025-02-17

## 2025-02-21 RX ORDER — INSULIN GLARGINE 100 [IU]/ML
100 INJECTION, SOLUTION SUBCUTANEOUS
Qty: 5 | Refills: 1 | Status: ACTIVE | COMMUNITY
Start: 2025-02-21 | End: 1900-01-01

## 2025-03-18 ENCOUNTER — NON-APPOINTMENT (OUTPATIENT)
Age: 80
End: 2025-03-18

## 2025-03-31 NOTE — REVIEW OF SYSTEMS
As a final attempt, a third outreach has been made via telephone call to facility. Please see Contacts section for details. This encounter will be closed and completed by end of day. Should we receive the requested information because of previous outreach attempts, the requested patient's chart will be updated appropriately.     Thank you  Karthik Olsen MA    [Dyspnea on exertion] : dyspnea during exertion [Urinary Frequency] : urinary frequency [Negative] : Neurological [Earache] : no earache [Sore Throat] : no sore throat [SOB] : no shortness of breath [Chest Discomfort] : no chest discomfort [Palpitations] : no palpitations [Joint Pain] : no joint pain [FreeTextEntry8] : BPH

## 2025-04-08 ENCOUNTER — APPOINTMENT (OUTPATIENT)
Dept: FAMILY MEDICINE | Facility: CLINIC | Age: 80
End: 2025-04-08
Payer: MEDICARE

## 2025-04-08 VITALS
HEIGHT: 71 IN | OXYGEN SATURATION: 96 % | BODY MASS INDEX: 33.04 KG/M2 | HEART RATE: 68 BPM | TEMPERATURE: 98.7 F | WEIGHT: 236 LBS

## 2025-04-08 VITALS — SYSTOLIC BLOOD PRESSURE: 118 MMHG | DIASTOLIC BLOOD PRESSURE: 68 MMHG

## 2025-04-08 DIAGNOSIS — E55.9 VITAMIN D DEFICIENCY, UNSPECIFIED: ICD-10-CM

## 2025-04-08 DIAGNOSIS — N40.0 BENIGN PROSTATIC HYPERPLASIA WITHOUT LOWER URINARY TRACT SYMPMS: ICD-10-CM

## 2025-04-08 DIAGNOSIS — Z00.00 ENCOUNTER FOR GENERAL ADULT MEDICAL EXAMINATION W/OUT ABNORMAL FINDINGS: ICD-10-CM

## 2025-04-08 DIAGNOSIS — L12.9 PEMPHIGOID, UNSPECIFIED: ICD-10-CM

## 2025-04-08 DIAGNOSIS — I11.9 HYPERTENSIVE HEART DISEASE W/OUT HEART FAILURE: ICD-10-CM

## 2025-04-08 DIAGNOSIS — Z12.5 ENCOUNTER FOR SCREENING FOR MALIGNANT NEOPLASM OF PROSTATE: ICD-10-CM

## 2025-04-08 DIAGNOSIS — I25.10 ATHEROSCLEROTIC HEART DISEASE OF NATIVE CORONARY ARTERY W/OUT ANGINA PECTORIS: ICD-10-CM

## 2025-04-08 DIAGNOSIS — N18.30 CHRONIC KIDNEY DISEASE, STAGE 3 UNSPECIFIED: ICD-10-CM

## 2025-04-08 DIAGNOSIS — E11.9 TYPE 2 DIABETES MELLITUS W/OUT COMPLICATIONS: ICD-10-CM

## 2025-04-08 LAB
BILIRUB UR QL STRIP: NORMAL
GLUCOSE UR-MCNC: ABNORMAL
HCG UR QL: 0.2 EU/DL
HGB UR QL STRIP.AUTO: ABNORMAL
KETONES UR-MCNC: NORMAL
LEUKOCYTE ESTERASE UR QL STRIP: NORMAL
NITRITE UR QL STRIP: NORMAL
PH UR STRIP: 6
PROT UR STRIP-MCNC: NORMAL
SP GR UR STRIP: 1.01

## 2025-04-08 PROCEDURE — G0439: CPT

## 2025-04-08 PROCEDURE — 99213 OFFICE O/P EST LOW 20 MIN: CPT | Mod: 25

## 2025-04-08 PROCEDURE — 81003 URINALYSIS AUTO W/O SCOPE: CPT | Mod: QW

## 2025-04-08 PROCEDURE — G2211 COMPLEX E/M VISIT ADD ON: CPT

## 2025-04-08 PROCEDURE — 36415 COLL VENOUS BLD VENIPUNCTURE: CPT

## 2025-04-08 RX ORDER — RITUXIMAB 10 MG/ML
500 INJECTION, SOLUTION INTRAVENOUS
Refills: 0 | Status: ACTIVE | COMMUNITY

## 2025-04-08 RX ORDER — PREDNISONE 10 MG/1
10 TABLET ORAL
Refills: 0 | Status: ACTIVE | COMMUNITY

## 2025-04-09 ENCOUNTER — APPOINTMENT (OUTPATIENT)
Dept: CARDIOLOGY | Facility: CLINIC | Age: 80
End: 2025-04-09
Payer: MEDICARE

## 2025-04-09 ENCOUNTER — NON-APPOINTMENT (OUTPATIENT)
Age: 80
End: 2025-04-09

## 2025-04-09 VITALS
WEIGHT: 236 LBS | OXYGEN SATURATION: 99 % | BODY MASS INDEX: 33.04 KG/M2 | SYSTOLIC BLOOD PRESSURE: 144 MMHG | HEIGHT: 71 IN | DIASTOLIC BLOOD PRESSURE: 66 MMHG | HEART RATE: 69 BPM

## 2025-04-09 DIAGNOSIS — M25.471 EFFUSION, RIGHT ANKLE: ICD-10-CM

## 2025-04-09 DIAGNOSIS — R94.31 ABNORMAL ELECTROCARDIOGRAM [ECG] [EKG]: ICD-10-CM

## 2025-04-09 DIAGNOSIS — R06.02 SHORTNESS OF BREATH: ICD-10-CM

## 2025-04-09 DIAGNOSIS — I10 ESSENTIAL (PRIMARY) HYPERTENSION: ICD-10-CM

## 2025-04-09 DIAGNOSIS — M25.472 EFFUSION, RIGHT ANKLE: ICD-10-CM

## 2025-04-09 DIAGNOSIS — E78.2 MIXED HYPERLIPIDEMIA: ICD-10-CM

## 2025-04-09 LAB
BASOPHILS # BLD AUTO: 0.04 K/UL
BASOPHILS NFR BLD AUTO: 0.4 %
CHOLEST SERPL-MCNC: 125 MG/DL
CREAT SPEC-SCNC: 18 MG/DL
EOSINOPHIL # BLD AUTO: 0.31 K/UL
EOSINOPHIL NFR BLD AUTO: 2.7 %
HCT VFR BLD CALC: 42.7 %
HDLC SERPL-MCNC: 37 MG/DL
HGB BLD-MCNC: 13.2 G/DL
IMM GRANULOCYTES NFR BLD AUTO: 1.3 %
LDLC SERPL-MCNC: 65 MG/DL
LYMPHOCYTES # BLD AUTO: 1.81 K/UL
LYMPHOCYTES NFR BLD AUTO: 15.9 %
MAN DIFF?: NORMAL
MCHC RBC-ENTMCNC: 29.8 PG
MCHC RBC-ENTMCNC: 30.9 G/DL
MCV RBC AUTO: 96.4 FL
MICROALBUMIN 24H UR DL<=1MG/L-MCNC: <1.2 MG/DL
MICROALBUMIN/CREAT 24H UR-RTO: NORMAL MG/G
MONOCYTES # BLD AUTO: 0.78 K/UL
MONOCYTES NFR BLD AUTO: 6.8 %
NEUTROPHILS # BLD AUTO: 8.3 K/UL
NEUTROPHILS NFR BLD AUTO: 72.9 %
NONHDLC SERPL-MCNC: 88 MG/DL
PLATELET # BLD AUTO: 308 K/UL
RBC # BLD: 4.43 M/UL
RBC # FLD: 14.2 %
T3FREE SERPL-MCNC: 3.17 PG/ML
T4 FREE SERPL-MCNC: 1.7 NG/DL
TRIGL SERPL-MCNC: 131 MG/DL
TSH SERPL-ACNC: 2.16 UIU/ML
WBC # FLD AUTO: 11.39 K/UL

## 2025-04-09 PROCEDURE — G2211 COMPLEX E/M VISIT ADD ON: CPT

## 2025-04-09 PROCEDURE — 99214 OFFICE O/P EST MOD 30 MIN: CPT

## 2025-04-09 RX ORDER — FUROSEMIDE 20 MG/1
20 TABLET ORAL
Qty: 30 | Refills: 1 | Status: ACTIVE | COMMUNITY
Start: 2025-04-09 | End: 1900-01-01

## 2025-04-10 LAB
ALBUMIN SERPL ELPH-MCNC: 4 G/DL
ALP BLD-CCNC: 59 U/L
ALT SERPL-CCNC: 32 U/L
ANION GAP SERPL CALC-SCNC: 11 MMOL/L
AST SERPL-CCNC: 24 U/L
BILIRUB SERPL-MCNC: 0.4 MG/DL
BUN SERPL-MCNC: 30 MG/DL
CALCIUM SERPL-MCNC: 10 MG/DL
CHLORIDE SERPL-SCNC: 103 MMOL/L
CO2 SERPL-SCNC: 27 MMOL/L
CREAT SERPL-MCNC: 1.87 MG/DL
EGFRCR SERPLBLD CKD-EPI 2021: 36 ML/MIN/1.73M2
ESTIMATED AVERAGE GLUCOSE: 134 MG/DL
GLUCOSE SERPL-MCNC: 129 MG/DL
HBA1C MFR BLD HPLC: 6.3 %
POTASSIUM SERPL-SCNC: 4.3 MMOL/L
PROT SERPL-MCNC: 6.5 G/DL
SODIUM SERPL-SCNC: 141 MMOL/L

## 2025-04-22 ENCOUNTER — APPOINTMENT (OUTPATIENT)
Dept: ENDOCRINOLOGY | Facility: CLINIC | Age: 80
End: 2025-04-22

## 2025-04-22 VITALS
OXYGEN SATURATION: 98 % | DIASTOLIC BLOOD PRESSURE: 82 MMHG | HEIGHT: 71 IN | SYSTOLIC BLOOD PRESSURE: 123 MMHG | WEIGHT: 234.5 LBS | BODY MASS INDEX: 32.83 KG/M2 | HEART RATE: 75 BPM

## 2025-04-22 DIAGNOSIS — E11.9 TYPE 2 DIABETES MELLITUS W/OUT COMPLICATIONS: ICD-10-CM

## 2025-04-22 DIAGNOSIS — E78.2 MIXED HYPERLIPIDEMIA: ICD-10-CM

## 2025-04-22 DIAGNOSIS — I10 ESSENTIAL (PRIMARY) HYPERTENSION: ICD-10-CM

## 2025-04-22 DIAGNOSIS — E66.9 OBESITY, UNSPECIFIED: ICD-10-CM

## 2025-04-22 DIAGNOSIS — E55.9 VITAMIN D DEFICIENCY, UNSPECIFIED: ICD-10-CM

## 2025-04-22 DIAGNOSIS — E83.52 HYPERCALCEMIA: ICD-10-CM

## 2025-04-22 PROCEDURE — 95251 CONT GLUC MNTR ANALYSIS I&R: CPT

## 2025-04-22 PROCEDURE — 99214 OFFICE O/P EST MOD 30 MIN: CPT

## 2025-04-23 LAB — GLUCOSE BLDC GLUCOMTR-MCNC: 119

## 2025-05-01 ENCOUNTER — APPOINTMENT (OUTPATIENT)
Age: 80
End: 2025-05-01

## 2025-05-15 ENCOUNTER — RX RENEWAL (OUTPATIENT)
Age: 80
End: 2025-05-15

## 2025-05-21 NOTE — ASU PATIENT PROFILE, ADULT - PAIN CHRONIC, PROFILE
Hospital follow-up appointment was scheduled by [facility].  Patient follow-up appointment was scheduled for 5/27/25 at 11:30 am at [facility].      Jose PACHECO, RSW  Case Management   no

## 2025-06-03 ENCOUNTER — RX RENEWAL (OUTPATIENT)
Age: 80
End: 2025-06-03

## 2025-06-10 ENCOUNTER — APPOINTMENT (OUTPATIENT)
Dept: CARDIOLOGY | Facility: CLINIC | Age: 80
End: 2025-06-10
Payer: MEDICARE

## 2025-06-10 VITALS
SYSTOLIC BLOOD PRESSURE: 118 MMHG | OXYGEN SATURATION: 96 % | WEIGHT: 233 LBS | DIASTOLIC BLOOD PRESSURE: 56 MMHG | BODY MASS INDEX: 32.62 KG/M2 | HEIGHT: 71 IN | HEART RATE: 59 BPM

## 2025-06-10 PROCEDURE — 99214 OFFICE O/P EST MOD 30 MIN: CPT

## 2025-06-10 PROCEDURE — G2211 COMPLEX E/M VISIT ADD ON: CPT

## 2025-06-10 PROCEDURE — 93000 ELECTROCARDIOGRAM COMPLETE: CPT

## 2025-06-10 RX ORDER — OMEPRAZOLE 20 MG/1
20 TABLET, DELAYED RELEASE ORAL DAILY
Refills: 0 | Status: ACTIVE | COMMUNITY

## 2025-08-01 ENCOUNTER — APPOINTMENT (OUTPATIENT)
Dept: ENDOCRINOLOGY | Facility: CLINIC | Age: 80
End: 2025-08-01